# Patient Record
Sex: FEMALE | Race: WHITE | NOT HISPANIC OR LATINO | Employment: OTHER | URBAN - METROPOLITAN AREA
[De-identification: names, ages, dates, MRNs, and addresses within clinical notes are randomized per-mention and may not be internally consistent; named-entity substitution may affect disease eponyms.]

---

## 2013-11-04 LAB — HEP C HIM: NORMAL

## 2017-02-09 ENCOUNTER — TELEPHONE (OUTPATIENT)
Dept: FAMILY MEDICINE | Facility: CLINIC | Age: 52
End: 2017-02-09

## 2017-02-09 NOTE — TELEPHONE ENCOUNTER
Received prior authorization approval for zolpidem, effective 1/7/17 to 8/5/17.  The pharmacy and the patient have been informed.    Baltazar Olivera,   [facility]  [phone]

## 2017-03-16 DIAGNOSIS — K21.9 GASTROESOPHAGEAL REFLUX DISEASE WITHOUT ESOPHAGITIS: ICD-10-CM

## 2017-03-20 RX ORDER — OMEPRAZOLE 40 MG/1
40 CAPSULE, DELAYED RELEASE ORAL 2 TIMES DAILY
Qty: 180 CAPSULE | Refills: 3 | Status: SHIPPED | OUTPATIENT
Start: 2017-03-20 | End: 2018-03-22

## 2017-04-06 DIAGNOSIS — Z76.0 ENCOUNTER FOR MEDICATION REFILL: ICD-10-CM

## 2017-04-06 NOTE — TELEPHONE ENCOUNTER
Pending Prescriptions:                       Disp   Refills    zolpidem (AMBIEN) 10 MG tablet [Pharmacy M*30 tab*0        Sig: TAKE 1 TABLET BY MOUTH EVERY NIGHT AS NEEDED    Last OV 11/11/16  CMP 9/12/16

## 2017-04-07 RX ORDER — ZOLPIDEM TARTRATE 10 MG/1
TABLET ORAL
Qty: 30 TABLET | Refills: 0 | Status: SHIPPED | OUTPATIENT
Start: 2017-04-07 | End: 2017-05-02

## 2017-04-10 DIAGNOSIS — Z79.899 ENCOUNTER FOR LONG-TERM (CURRENT) USE OF MEDICATIONS: Primary | ICD-10-CM

## 2017-04-10 PROCEDURE — 80053 COMPREHEN METABOLIC PANEL: CPT | Mod: 90 | Performed by: FAMILY MEDICINE

## 2017-04-10 PROCEDURE — 36415 COLL VENOUS BLD VENIPUNCTURE: CPT | Performed by: FAMILY MEDICINE

## 2017-04-11 LAB
ALBUMIN SERPL-MCNC: 4.4 G/DL (ref 3.5–5.5)
ALBUMIN/GLOB SERPL: 1.6 {RATIO} (ref 1.2–2.2)
ALP SERPL-CCNC: 56 IU/L (ref 39–117)
ALT SERPL-CCNC: 15 IU/L (ref 0–32)
AST SERPL-CCNC: 15 IU/L (ref 0–40)
BILIRUB SERPL-MCNC: 0.3 MG/DL (ref 0–1.2)
BUN SERPL-MCNC: 29 MG/DL (ref 6–24)
BUN/CREATININE RATIO: 23 (ref 9–23)
CALCIUM SERPL-MCNC: 9.9 MG/DL (ref 8.7–10.2)
CHLORIDE SERPLBLD-SCNC: 104 MMOL/L (ref 96–106)
CREAT SERPL-MCNC: 1.25 MG/DL (ref 0.57–1)
EGFR IF AFRICN AM: 58 ML/MIN/1.73
EGFR IF NONAFRICN AM: 51 ML/MIN/1.73
GLOBULIN, TOTAL: 2.8 G/DL (ref 1.5–4.5)
GLUCOSE SERPL-MCNC: 83 MG/DL (ref 65–99)
POTASSIUM SERPL-SCNC: 4.3 MMOL/L (ref 3.5–5.2)
PROT SERPL-MCNC: 7.2 G/DL (ref 6–8.5)
SODIUM SERPL-SCNC: 143 MMOL/L (ref 134–144)
TOTAL CO2: 21 MMOL/L (ref 18–28)

## 2017-04-13 DIAGNOSIS — Z94.4 TRANSPLANTED LIVER (H): Primary | ICD-10-CM

## 2017-07-24 DIAGNOSIS — Z01.89 ROUTINE LAB DRAW: Primary | ICD-10-CM

## 2017-07-24 PROCEDURE — 36415 COLL VENOUS BLD VENIPUNCTURE: CPT | Performed by: FAMILY MEDICINE

## 2017-07-25 ENCOUNTER — TELEPHONE (OUTPATIENT)
Dept: FAMILY MEDICINE | Facility: CLINIC | Age: 52
End: 2017-07-25

## 2017-07-25 NOTE — TELEPHONE ENCOUNTER
Received fax from [employer] saying patients PA for Zolpidem needs new prior authorization.   Submitted to [email or web address].   Received fax back stating PA was approved.  Approval dates 06/24/2017-07/24/2018.   Called [employer] to inform of approval.

## 2017-08-04 NOTE — PROGRESS NOTES
7/27/17 we faxed lab results from 10/17/16 to 4/10/17 to [facility], atten: erik @ [phone]    Baltazar Olivera,   [facility]  [phone]

## 2017-10-12 DIAGNOSIS — E78.5 HYPERLIPIDEMIA WITH TARGET LDL LESS THAN 100: Primary | ICD-10-CM

## 2017-10-12 DIAGNOSIS — Z94.4 TRANSPLANTED LIVER (H): ICD-10-CM

## 2017-10-12 LAB
% GRANULOCYTES: 69.7 % (ref 42.2–75.2)
HCT VFR BLD AUTO: 41.9 % (ref 35–46)
HEMOGLOBIN: 13.8 G/DL (ref 11.8–15.5)
LYMPHOCYTES NFR BLD AUTO: 24.2 % (ref 20.5–51.1)
MCH RBC QN AUTO: 28.1 PG (ref 27–31)
MCHC RBC AUTO-ENTMCNC: 33.1 G/DL (ref 33–37)
MCV RBC AUTO: 85 FL (ref 80–100)
MONOCYTES NFR BLD AUTO: 6.1 % (ref 1.7–9.3)
PLATELET # BLD AUTO: 201 K/UL (ref 140–450)
RBC # BLD AUTO: 4.93 X10/CMM (ref 3.7–5.2)
WBC # BLD AUTO: 7.7 X10/CMM (ref 3.8–11)

## 2017-10-12 PROCEDURE — 36415 COLL VENOUS BLD VENIPUNCTURE: CPT | Performed by: FAMILY MEDICINE

## 2017-10-12 PROCEDURE — 80053 COMPREHEN METABOLIC PANEL: CPT | Mod: 90 | Performed by: FAMILY MEDICINE

## 2017-10-12 PROCEDURE — 85025 COMPLETE CBC W/AUTO DIFF WBC: CPT | Performed by: FAMILY MEDICINE

## 2017-10-13 LAB
ALBUMIN SERPL-MCNC: 4.2 G/DL (ref 3.5–5.5)
ALBUMIN/GLOB SERPL: 1.4 {RATIO} (ref 1.2–2.2)
ALP SERPL-CCNC: 57 IU/L (ref 39–117)
ALT SERPL-CCNC: 8 IU/L (ref 0–32)
AST SERPL-CCNC: 15 IU/L (ref 0–40)
BILIRUB SERPL-MCNC: 0.3 MG/DL (ref 0–1.2)
BUN SERPL-MCNC: 27 MG/DL (ref 6–24)
BUN/CREATININE RATIO: 20 (ref 9–23)
CALCIUM SERPL-MCNC: 9.6 MG/DL (ref 8.7–10.2)
CHLORIDE SERPLBLD-SCNC: 104 MMOL/L (ref 96–106)
CREAT SERPL-MCNC: 1.32 MG/DL (ref 0.57–1)
EGFR IF AFRICN AM: 54 ML/MIN/1.73
EGFR IF NONAFRICN AM: 47 ML/MIN/1.73
GLOBULIN, TOTAL: 2.9 G/DL (ref 1.5–4.5)
GLUCOSE SERPL-MCNC: 91 MG/DL (ref 65–99)
POTASSIUM SERPL-SCNC: 4.3 MMOL/L (ref 3.5–5.2)
PROT SERPL-MCNC: 7.1 G/DL (ref 6–8.5)
SODIUM SERPL-SCNC: 143 MMOL/L (ref 134–144)
TOTAL CO2: 22 MMOL/L (ref 18–28)

## 2017-10-26 DIAGNOSIS — Z76.0 ENCOUNTER FOR MEDICATION REFILL: ICD-10-CM

## 2017-10-26 NOTE — TELEPHONE ENCOUNTER
ambien last OV - 11/11/16 last labs 10/12/17- due no appt scheduled  Ashley Berry MA October 26, 2017 8:40 AM

## 2017-10-27 RX ORDER — ZOLPIDEM TARTRATE 10 MG/1
TABLET ORAL
Qty: 30 TABLET | Refills: 0 | Status: SHIPPED | OUTPATIENT
Start: 2017-10-27 | End: 2017-11-27

## 2017-11-14 ENCOUNTER — TRANSFERRED RECORDS (OUTPATIENT)
Dept: FAMILY MEDICINE | Facility: CLINIC | Age: 52
End: 2017-11-14

## 2017-11-16 ENCOUNTER — TRANSFERRED RECORDS (OUTPATIENT)
Dept: FAMILY MEDICINE | Facility: CLINIC | Age: 52
End: 2017-11-16

## 2017-11-27 DIAGNOSIS — Z76.0 ENCOUNTER FOR MEDICATION REFILL: ICD-10-CM

## 2017-11-27 RX ORDER — ZOLPIDEM TARTRATE 10 MG/1
TABLET ORAL
Qty: 30 TABLET | Refills: 0 | Status: SHIPPED | OUTPATIENT
Start: 2017-11-27 | End: 2017-12-26

## 2017-11-27 NOTE — TELEPHONE ENCOUNTER
zolpidem (AMBIEN) 10 MG last OV - 11/11/16 last labs 10/12/17- due with no appt scheduled  Ashley Berry MA November 27, 2017 2:34 PM

## 2017-12-01 ENCOUNTER — TRANSFERRED RECORDS (OUTPATIENT)
Dept: FAMILY MEDICINE | Facility: CLINIC | Age: 52
End: 2017-12-01

## 2017-12-13 ENCOUNTER — OFFICE VISIT (OUTPATIENT)
Dept: FAMILY MEDICINE | Facility: CLINIC | Age: 52
End: 2017-12-13

## 2017-12-13 VITALS
OXYGEN SATURATION: 98 % | HEART RATE: 59 BPM | HEIGHT: 62 IN | DIASTOLIC BLOOD PRESSURE: 62 MMHG | BODY MASS INDEX: 35.51 KG/M2 | WEIGHT: 193 LBS | SYSTOLIC BLOOD PRESSURE: 122 MMHG

## 2017-12-13 DIAGNOSIS — F33.1 MAJOR DEPRESSIVE DISORDER, RECURRENT EPISODE, MODERATE (H): ICD-10-CM

## 2017-12-13 DIAGNOSIS — G44.201 ACUTE INTRACTABLE TENSION-TYPE HEADACHE: ICD-10-CM

## 2017-12-13 DIAGNOSIS — Z23 NEED FOR PROPHYLACTIC VACCINATION AND INOCULATION AGAINST INFLUENZA: Primary | ICD-10-CM

## 2017-12-13 PROCEDURE — 90471 IMMUNIZATION ADMIN: CPT | Performed by: FAMILY MEDICINE

## 2017-12-13 PROCEDURE — 90686 IIV4 VACC NO PRSV 0.5 ML IM: CPT | Performed by: FAMILY MEDICINE

## 2017-12-13 PROCEDURE — 99214 OFFICE O/P EST MOD 30 MIN: CPT | Mod: 25 | Performed by: FAMILY MEDICINE

## 2017-12-13 RX ORDER — CYCLOBENZAPRINE HCL 10 MG
10 TABLET ORAL
Qty: 14 TABLET | Refills: 1 | Status: SHIPPED | OUTPATIENT
Start: 2017-12-13 | End: 2018-03-28

## 2017-12-13 RX ORDER — DULOXETIN HYDROCHLORIDE 30 MG/1
30 CAPSULE, DELAYED RELEASE ORAL 2 TIMES DAILY
Qty: 60 CAPSULE | Refills: 1 | Status: SHIPPED | OUTPATIENT
Start: 2017-12-13 | End: 2018-02-18

## 2017-12-13 ASSESSMENT — ANXIETY QUESTIONNAIRES
5. BEING SO RESTLESS THAT IT IS HARD TO SIT STILL: MORE THAN HALF THE DAYS
6. BECOMING EASILY ANNOYED OR IRRITABLE: NEARLY EVERY DAY
7. FEELING AFRAID AS IF SOMETHING AWFUL MIGHT HAPPEN: MORE THAN HALF THE DAYS
IF YOU CHECKED OFF ANY PROBLEMS ON THIS QUESTIONNAIRE, HOW DIFFICULT HAVE THESE PROBLEMS MADE IT FOR YOU TO DO YOUR WORK, TAKE CARE OF THINGS AT HOME, OR GET ALONG WITH OTHER PEOPLE: EXTREMELY DIFFICULT
3. WORRYING TOO MUCH ABOUT DIFFERENT THINGS: NEARLY EVERY DAY
GAD7 TOTAL SCORE: 19
1. FEELING NERVOUS, ANXIOUS, OR ON EDGE: NEARLY EVERY DAY
2. NOT BEING ABLE TO STOP OR CONTROL WORRYING: NEARLY EVERY DAY

## 2017-12-13 ASSESSMENT — PATIENT HEALTH QUESTIONNAIRE - PHQ9
5. POOR APPETITE OR OVEREATING: NEARLY EVERY DAY
SUM OF ALL RESPONSES TO PHQ QUESTIONS 1-9: 20

## 2017-12-13 NOTE — PROGRESS NOTES
"Here for refills of zolpidem. Also headache for 10 days; steady, top of head. Takes tylenol everyday, helps but doesn't go away. No URI symptoms. History of migraines in the past. Also recent venlafaxine use, prescribed by a [place] physician for depression, but made her heart race, felt \"out of whack\". She has used cymbalta in the past, but did not feel it helped. However, she only took 30 mg a day.  Patient Active Problem List   Diagnosis     History of liver transplant (H)     HTN (hypertension)     History of kidney transplant     Hyperlipidemia with target LDL less than 100     History of gout     Anemia     Scleroderma, diffuse (H)     Raynaud phenomenon     Health Care Home     History of transplantation, renal     Status post liver transplantation (H)     Controlled substance agreement signed     Fibromyalgia     Current Outpatient Prescriptions   Medication     zolpidem (AMBIEN) 10 MG tablet     omeprazole (PRILOSEC) 40 MG capsule     amLODIPine (NORVASC) 5 MG tablet     mycophenolate (CELLCEPT - GENERIC EQUIVALENT) 250 MG capsule     predniSONE (DELTASONE) 5 MG tablet     CALCIUM-VITAMIN D PO     allopurinol (ZYLOPRIM) 100 MG tablet     metoprolol (TOPROL-XL) 25 MG 24 hr tablet     aspirin 81 MG tablet     tacrolimus (PROGRAF) 1 MG capsule     No current facility-administered medications for this visit.      ROS: feels down, not interesting in socializing. Really since transplant about 5 years ago. No fevers, CP, SOB.      /62  Pulse 59  Ht 1.562 m (5' 1.5\")  Wt 87.5 kg (193 lb)  SpO2 98%  BMI 35.88 kg/m2     Alert, and oriented. Mood is fair. Insight seems fine.   Turbinates are normal, oropharynx is normal. Scalp is clear. Neck slightly stiff, slightly muscle tenderness. RRR. Lungs are clear.    (Z23) Need for prophylactic vaccination and inoculation against influenza  (primary encounter diagnosis)  Comment:   Plan: FLU VAC, SPLIT VIRUS IM > 3 YO (QUADRIVALENT)         [65917], Vaccine " Administration, Initial         [09920]            (G44.201) Acute intractable tension-type headache  Comment: prob cervical strain  Plan: cyclobenzaprine (FLEXERIL) 10 MG tablet         Hs prn #14. NR. Warned of addictive potential, and will be refilled infrequently(2-3X annually)    (F33.1) Major depressive disorder, recurrent episode, moderate (H)  Comment:   Plan: DULoxetine (CYMBALTA) 30 MG EC capsule        Start daily, then bid in a few days, and RTC in 2-3 weeks      Injectable Influenza Immunization Documentation    1.  Is the person to be vaccinated sick today?   No    2. Does the person to be vaccinated have an allergy to a component   of the vaccine?   No  Egg Allergy Algorithm Link    3. Has the person to be vaccinated ever had a serious reaction   to influenza vaccine in the past?   No    4. Has the person to be vaccinated ever had Guillain-Barré syndrome?   No    Form completed by

## 2017-12-13 NOTE — MR AVS SNAPSHOT
After Visit Summary   12/13/2017    Nani Ford    MRN: [id number]           Patient Information     Date Of Birth          1965        Visit Information        Provider Department      12/13/2017 8:15 AM Evelio Torrez MD [facility]        Today's Diagnoses     Need for prophylactic vaccination and inoculation against influenza    -  1    Acute intractable tension-type headache        Major depressive disorder, recurrent episode, moderate (H)           Follow-ups after your visit        Your next 10 appointments already scheduled     Jan 03, 2018  8:30 AM CST   SHORT with Evelio Torrez MD   [facility] ([facility])    [address] [place] [address] [phone]              Who to contact     If you have questions or need follow up information about today's clinic visit or your schedule please contact [facility] directly at [phone].  Normal or non-critical lab and imaging results will be communicated to you by MyChart, letter or phone within 4 business days after the clinic has received the results. If you do not hear from us within 7 days, please contact the clinic through [employer] or phone. If you have a critical or abnormal lab result, we will notify you by phone as soon as possible.  Submit refill requests through [employer] or call your pharmacy and they will forward the refill request to us. Please allow 3 business days for your refill to be completed.          Additional Information About Your Visit        MyChart Information     [employer] gives you secure access to your electronic health record. If you see a primary care provider, you can also send messages to your care team and make appointments. If you have questions, please call your primary care clinic.  If you do not have a primary care provider, please call [phone] and they will assist you.        Care EveryWhere ID     This is your Care EveryWhere  "ID. This could be used by other organizations to access your [place] medical records  [id number]        Your Vitals Were     Pulse Height Pulse Oximetry BMI (Body Mass Index)          59 1.562 m (5' 1.5\") 98% 35.88 kg/m2         Blood Pressure from Last 3 Encounters:   12/13/17 122/62   11/11/16 102/60   11/02/16 140/90    Weight from Last 3 Encounters:   12/13/17 87.5 kg (193 lb)   11/11/16 84.4 kg (186 lb)   11/02/16 83.5 kg (184 lb)              We Performed the Following     FLU VAC, SPLIT VIRUS IM > 3 YO (QUADRIVALENT) [66883]     Vaccine Administration, Initial [63053]          Today's Medication Changes          These changes are accurate as of: 12/13/17  3:37 PM.  If you have any questions, ask your nurse or doctor.               Start taking these medicines.        Dose/Directions    cyclobenzaprine 10 MG tablet   Commonly known as:  FLEXERIL   Used for:  Acute intractable tension-type headache   Started by:  Evelio Torrez MD        Dose:  10 mg   Take 1 tablet (10 mg) by mouth nightly as needed for muscle spasms   Quantity:  14 tablet   Refills:  1         These medicines have changed or have updated prescriptions.        Dose/Directions    DULoxetine 30 MG EC capsule   Commonly known as:  CYMBALTA   This may have changed:  when to take this   Used for:  Major depressive disorder, recurrent episode, moderate (H)   Changed by:  Evelio Torrez MD        Dose:  30 mg   Take 1 capsule (30 mg) by mouth 2 times daily   Quantity:  60 capsule   Refills:  1       tacrolimus 1 MG capsule   Indication:  PT TAKES 1.5 TABS 2X DAY   This may have changed:  Another medication with the same name was removed. Continue taking this medication, and follow the directions you see here.   Generic drug:  tacrolimus   Changed by:  Tavo Mann MD        Dose:  1 mg   Take 1 mg by mouth 1.5 mg BID   Refills:  0         Stop taking these medicines if you haven't already. Please contact your care team if you have " questions.     estradiol 1 MG tablet   Commonly known as:  ESTRACE   Stopped by:  Evelio Torrez MD           HYDROcodone-acetaminophen 7.5-325 MG per tablet   Commonly known as:  NORCO   Stopped by:  Evelio Torrez MD           imipramine 25 MG tablet   Commonly known as:  TOFRANIL   Stopped by:  Evelio Torrez MD           oxyCODONE 5 MG capsule   Commonly known as:  OXY-IR   Stopped by:  Evelio Torrez MD                Where to get your medicines      These medications were sent to [facility] [address] [place] [address] [address] & [facility]  [address] [place], [place] [address]E [facility] [address]    Hours:  24-hours Phone:  [phone]     cyclobenzaprine 10 MG tablet    DULoxetine 30 MG EC capsule                Primary Care Provider Office Phone # Fax #    Evelio Torrez [phone] [address] [place] [address]        Equal Access to Services     [facility]: Hadii aad ku hadasho Soomaali, waaxda luqadaha, qaybta kaalmada adeegyada, waxay idiin hayaan adeeg kharash la'aan . So [facility] [phone].    ATENCIÓN: Si habla español, tiene a valencia disposición servicios gratuitos de asistencia lingüística. [facility] [phone].    We comply with applicable federal civil rights laws and [place] laws. We do not discriminate on the basis of race, color, national origin, age, disability, sex, sexual orientation, or gender identity.            Thank you!     Thank you for choosing [facility]  for your care. Our goal is always to provide you with excellent care. Hearing back from our patients is one way we can continue to improve our services. Please take a few minutes to complete the written survey that you may receive in the mail after your visit with us. Thank you!             Your Updated Medication List - Protect others around you: Learn how to safely use, store and throw away your medicines at www.disposemymeds.org.          This list is  accurate as of: 12/13/17  3:37 PM.  Always use your most recent med list.                   Brand Name Dispense Instructions for use Diagnosis    allopurinol 100 MG tablet    ZYLOPRIM    30 tablet    Take 1 tablet by mouth daily.    Gout, chronic       amLODIPine 5 MG tablet    NORVASC          aspirin 81 MG tablet      Take 81 mg by mouth daily.        CALCIUM-VITAMIN D PO      Take 1 tablet by mouth 2 times daily.        cyclobenzaprine 10 MG tablet    FLEXERIL    14 tablet    Take 1 tablet (10 mg) by mouth nightly as needed for muscle spasms    Acute intractable tension-type headache       DULoxetine 30 MG EC capsule    CYMBALTA    60 capsule    Take 1 capsule (30 mg) by mouth 2 times daily    Major depressive disorder, recurrent episode, moderate (H)       metoprolol 25 MG 24 hr tablet    TOPROL-XL     Take 25 mg by mouth daily.        mycophenolate 250 MG capsule    GENERIC EQUIVALENT          omeprazole 40 MG capsule    priLOSEC    180 capsule    Take 1 capsule (40 mg) by mouth 2 times daily    Gastroesophageal reflux disease without esophagitis       predniSONE 5 MG tablet    DELTASONE     Take 1 tablet (5 mg) by mouth daily    Scleroderma (H)       tacrolimus 1 MG capsule   Generic drug:  tacrolimus      Take 1 mg by mouth 1.5 mg BID        zolpidem 10 MG tablet    AMBIEN    30 tablet    TAKE 1 TABLET BY MOUTH EVERY NIGHT AT BEDTIME    Encounter for medication refill

## 2017-12-14 ASSESSMENT — ANXIETY QUESTIONNAIRES: GAD7 TOTAL SCORE: 19

## 2017-12-26 DIAGNOSIS — Z76.0 ENCOUNTER FOR MEDICATION REFILL: ICD-10-CM

## 2017-12-26 RX ORDER — ZOLPIDEM TARTRATE 10 MG/1
TABLET ORAL
Qty: 30 TABLET | Refills: 0 | Status: SHIPPED | OUTPATIENT
Start: 2017-12-26 | End: 2018-01-24

## 2018-01-24 DIAGNOSIS — Z79.899 ENCOUNTER FOR LONG-TERM (CURRENT) USE OF MEDICATIONS: Primary | ICD-10-CM

## 2018-01-24 NOTE — TELEPHONE ENCOUNTER
zolpidem (AMBIEN) 10 MG   LAST  Med check 12/13/17   last labs(for RX) 10/13/17  Next  appt scheduled =  none  Ashley Berry MA

## 2018-01-26 RX ORDER — ZOLPIDEM TARTRATE 10 MG/1
TABLET ORAL
Qty: 30 TABLET | Refills: 1 | Status: SHIPPED | OUTPATIENT
Start: 2018-01-26 | End: 2018-03-22

## 2018-02-18 DIAGNOSIS — F33.1 MAJOR DEPRESSIVE DISORDER, RECURRENT EPISODE, MODERATE (H): ICD-10-CM

## 2018-02-18 RX ORDER — DULOXETIN HYDROCHLORIDE 30 MG/1
CAPSULE, DELAYED RELEASE ORAL
Qty: 60 CAPSULE | Refills: 0 | Status: SHIPPED | OUTPATIENT
Start: 2018-02-18 | End: 2018-03-28

## 2018-03-22 DIAGNOSIS — F33.1 MAJOR DEPRESSIVE DISORDER, RECURRENT EPISODE, MODERATE (H): ICD-10-CM

## 2018-03-22 RX ORDER — DULOXETIN HYDROCHLORIDE 30 MG/1
CAPSULE, DELAYED RELEASE ORAL
Qty: 60 CAPSULE | Refills: 0 | Status: CANCELLED | OUTPATIENT
Start: 2018-03-22

## 2018-03-23 NOTE — TELEPHONE ENCOUNTER
Dr. Browning received refill request for duloxetine and reviewed chart and visit history.   Patient last seen by Dr. Torrez 12/2017 and duloxetine started with instructions to RTC for recheck in 2-3 weeks. Patient did not follow through on this.     Per Dr. Browning -- Due for PHQ9. Last one was very high. Shan patient. If high schedule appointment. If phq=5 then ok. Fill script to get to appointment or if ok for 6 months.    Patient has appt with Dr. Torrez 3/28/18. For med check. Note on schedule to do PHQ9 at this visit.   Jazmin Way RN

## 2018-03-28 ENCOUNTER — OFFICE VISIT (OUTPATIENT)
Dept: FAMILY MEDICINE | Facility: CLINIC | Age: 53
End: 2018-03-28

## 2018-03-28 VITALS
HEART RATE: 57 BPM | RESPIRATION RATE: 16 BRPM | DIASTOLIC BLOOD PRESSURE: 80 MMHG | BODY MASS INDEX: 35.32 KG/M2 | SYSTOLIC BLOOD PRESSURE: 112 MMHG | WEIGHT: 190 LBS | OXYGEN SATURATION: 98 %

## 2018-03-28 DIAGNOSIS — M79.7 FIBROMYALGIA: Primary | ICD-10-CM

## 2018-03-28 DIAGNOSIS — F33.1 MAJOR DEPRESSIVE DISORDER, RECURRENT EPISODE, MODERATE (H): ICD-10-CM

## 2018-03-28 DIAGNOSIS — Z94.4 TRANSPLANTED LIVER (H): ICD-10-CM

## 2018-03-28 LAB
% GRANULOCYTES: 66.6 % (ref 42.2–75.2)
HCT VFR BLD AUTO: 43.2 % (ref 35–46)
HEMOGLOBIN: 13.8 G/DL (ref 11.8–15.5)
LYMPHOCYTES NFR BLD AUTO: 26 % (ref 20.5–51.1)
MCH RBC QN AUTO: 27.1 PG (ref 27–31)
MCHC RBC AUTO-ENTMCNC: 32 G/DL (ref 33–37)
MCV RBC AUTO: 84.9 FL (ref 80–100)
MONOCYTES NFR BLD AUTO: 7.4 % (ref 1.7–9.3)
PLATELET # BLD AUTO: 198 K/UL (ref 140–450)
RBC # BLD AUTO: 5.08 X10/CMM (ref 3.7–5.2)
WBC # BLD AUTO: 7.6 X10/CMM (ref 3.8–11)

## 2018-03-28 PROCEDURE — 80053 COMPREHEN METABOLIC PANEL: CPT | Mod: 90 | Performed by: FAMILY MEDICINE

## 2018-03-28 PROCEDURE — 36415 COLL VENOUS BLD VENIPUNCTURE: CPT | Performed by: FAMILY MEDICINE

## 2018-03-28 PROCEDURE — 85025 COMPLETE CBC W/AUTO DIFF WBC: CPT | Performed by: FAMILY MEDICINE

## 2018-03-28 PROCEDURE — 99213 OFFICE O/P EST LOW 20 MIN: CPT | Performed by: FAMILY MEDICINE

## 2018-03-28 RX ORDER — ACETAMINOPHEN 500 MG
1000 TABLET ORAL
COMMUNITY
End: 2018-03-28

## 2018-03-28 RX ORDER — NORTRIPTYLINE HCL 25 MG
25 CAPSULE ORAL AT BEDTIME
Qty: 30 CAPSULE | Refills: 2 | Status: SHIPPED | OUTPATIENT
Start: 2018-03-28 | End: 2018-06-21

## 2018-03-28 RX ORDER — DULOXETIN HYDROCHLORIDE 60 MG/1
60 CAPSULE, DELAYED RELEASE ORAL DAILY
Qty: 90 CAPSULE | Refills: 3 | Status: SHIPPED | OUTPATIENT
Start: 2018-03-28 | End: 2019-01-18

## 2018-03-28 ASSESSMENT — ANXIETY QUESTIONNAIRES
1. FEELING NERVOUS, ANXIOUS, OR ON EDGE: MORE THAN HALF THE DAYS
7. FEELING AFRAID AS IF SOMETHING AWFUL MIGHT HAPPEN: MORE THAN HALF THE DAYS
3. WORRYING TOO MUCH ABOUT DIFFERENT THINGS: MORE THAN HALF THE DAYS
6. BECOMING EASILY ANNOYED OR IRRITABLE: MORE THAN HALF THE DAYS
2. NOT BEING ABLE TO STOP OR CONTROL WORRYING: MORE THAN HALF THE DAYS
IF YOU CHECKED OFF ANY PROBLEMS ON THIS QUESTIONNAIRE, HOW DIFFICULT HAVE THESE PROBLEMS MADE IT FOR YOU TO DO YOUR WORK, TAKE CARE OF THINGS AT HOME, OR GET ALONG WITH OTHER PEOPLE: VERY DIFFICULT
5. BEING SO RESTLESS THAT IT IS HARD TO SIT STILL: MORE THAN HALF THE DAYS
GAD7 TOTAL SCORE: 14

## 2018-03-28 ASSESSMENT — PATIENT HEALTH QUESTIONNAIRE - PHQ9: 5. POOR APPETITE OR OVEREATING: MORE THAN HALF THE DAYS

## 2018-03-28 NOTE — NURSING NOTE
Patient also brought lavender tube labeled to be drawn for prograft levels - due to monitoring kidney rejection medications - in pre paid & marked mailing package  Besides standing orders for cbc & comp to have results  faxed to [facility]  Ashley Berry MA March 28, 2018 9:45 AM    [phone] - Dr JULIANE Saba- Hepatology & Liver transplantion

## 2018-03-28 NOTE — TELEPHONE ENCOUNTER
PHQ-9 done by patient at visit with Dr. Torrez today. Dr. Torrez sent rx for duloxetine x 1 year.  Jazmin Way RN

## 2018-03-28 NOTE — LETTER
My Depression Action Plan  Name: Nani Ford   Date of Birth 1965  Date: 3/28/2018    My doctor: Evelio Torrez   My clinic: [facility] [facility] [address] [place] [address] [phone]          GREEN    ZONE   Good Control    What it looks like:     Things are going generally well. You have normal up s and down s. You may even feel depressed from time to time, but bad moods usually last less than a day.   What you need to do:  1. Continue to care for yourself (see self care plan)  2. Check your depression survival kit and update it as needed  3. Follow your physician s recommendations including any medication.  4. Do not stop taking medication unless you consult with your physician first.           YELLOW         ZONE Getting Worse    What it looks like:     Depression is starting to interfere with your life.     It may be hard to get out of bed; you may be starting to isolate yourself from others.    Symptoms of depression are starting to last most all day and this has happened for several days.     You may have suicidal thoughts but they are not constant.   What you need to do:     1. Call your care team, your response to treatment will improve if you keep your care team informed of your progress. Yellow periods are signs an adjustment may need to be made.     2. Continue your self-care, even if you have to fake it!    3. Talk to someone in your support network    4. Open up your depression survival kit           RED    ZONE Medical Alert - Get Help    What it looks like:     Depression is seriously interfering with your life.     You may experience these or other symptoms: You can t get out of bed most days, can t work or engage in other necessary activities, you have trouble taking care of basic hygiene, or basic responsibilities, thoughts of suicide or death that will not go away, self-injurious behavior.     What you need to do:  1. Call your care team and  request a same-day appointment. If they are not available (weekends or after hours) call your local crisis line, emergency room or 911.            Depression Self Care Plan / Survival Kit    Self-Care for Depression  Here s the deal. Your body and mind are really not as separate as most people think.  What you do and think affects how you feel and how you feel influences what you do and think. This means if you do things that people who feel good do, it will help you feel better.  Sometimes this is all it takes.  There is also a place for medication and therapy depending on how severe your depression is, so be sure to consult with your medical provider and/ or Behavioral Health Consultant if your symptoms are worsening or not improving.     In order to better manage my stress, I will:    Exercise  Get some form of exercise, every day. This will help reduce pain and release endorphins, the  feel good  chemicals in your brain. This is almost as good as taking antidepressants!  This is not the same as joining a gym and then never going! (they count on that by the way ) It can be as simple as just going for a walk or doing some gardening, anything that will get you moving.      Hygiene   Maintain good hygiene (Get out of bed in the morning, Make your bed, Brush your teeth, Take a shower, and Get dressed like you were going to work, even if you are unemployed).  If your clothes don't fit try to get ones that do.    Diet  I will strive to eat foods that are good for me, drink plenty of water, and avoid excessive sugar, caffeine, alcohol, and other mood-altering substances.  Some foods that are helpful in depression are: complex carbohydrates, B vitamins, flaxseed, fish or fish oil, fresh fruits and vegetables.    Psychotherapy  I agree to participate in Individual Therapy (if recommended).    Medication  If prescribed medications, I agree to take them.  Missing doses can result in serious side effects.  I understand that  drinking alcohol, or other illicit drug use, may cause potential side effects.  I will not stop my medication abruptly without first discussing it with my provider.    Staying Connected With Others  I will stay in touch with my friends, family members, and my primary care provider/team.    Use your imagination  Be creative.  We all have a creative side; it doesn t matter if it s oil painting, sand castles, or mud pies! This will also kick up the endorphins.    Witness Beauty  (AKA stop and smell the roses) Take a look outside, even in mid-winter. Notice colors, textures. Watch the squirrels and birds.     Service to others  Be of service to others.  There is always someone else in need.  By helping others we can  get out of ourselves  and remember the really important things.  This also provides opportunities for practicing all the other parts of the program.    Humor  Laugh and be silly!  Adjust your TV habits for less news and crime-drama and more comedy.    Control your stress  Try breathing deep, massage therapy, biofeedback, and meditation. Find time to relax each day.     My support system    Clinic Contact:  Phone number:    Contact 1:  Phone number:    Contact 2:  Phone number:    Restorationist/:  Phone number:    Therapist:  Phone number:    Local crisis center:    Phone number:    Other community support:  Phone number:

## 2018-03-28 NOTE — MR AVS SNAPSHOT
After Visit Summary   3/28/2018    Nani Ford    MRN: [id number]           Patient Information     Date Of Birth          1965        Visit Information        Provider Department      3/28/2018 8:45 AM Evelio Torrez MD [facility]        Today's Diagnoses     Fibromyalgia    -  1    Major depressive disorder, recurrent episode, moderate (H)        Transplanted liver (H)           Follow-ups after your visit        Who to contact     If you have questions or need follow up information about today's clinic visit or your schedule please contact [facility] directly at [phone].  Normal or non-critical lab and imaging results will be communicated to you by Nouscohart, letter or phone within 4 business days after the clinic has received the results. If you do not hear from us within 7 days, please contact the clinic through TUBEt or phone. If you have a critical or abnormal lab result, we will notify you by phone as soon as possible.  Submit refill requests through [employer] or call your pharmacy and they will forward the refill request to us. Please allow 3 business days for your refill to be completed.          Additional Information About Your Visit        MyChart Information     [employer] gives you secure access to your electronic health record. If you see a primary care provider, you can also send messages to your care team and make appointments. If you have questions, please call your primary care clinic.  If you do not have a primary care provider, please call [phone] and they will assist you.        Care EveryWhere ID     This is your Care EveryWhere ID. This could be used by other organizations to access your [place] medical records  [id number]        Your Vitals Were     Pulse Respirations Pulse Oximetry BMI (Body Mass Index)          57 16 98% 35.32 kg/m2         Blood Pressure from Last 3 Encounters:   03/28/18 112/80   12/13/17 122/62   11/11/16  102/60    Weight from Last 3 Encounters:   03/28/18 86.2 kg (190 lb)   12/13/17 87.5 kg (193 lb)   11/11/16 84.4 kg (186 lb)              We Performed the Following     CBC with Diff/Plt ([facility])     Comp. Metabolic Panel (14) ([facility])          Today's Medication Changes          These changes are accurate as of 3/28/18  2:22 PM.  If you have any questions, ask your nurse or doctor.               Start taking these medicines.        Dose/Directions    nortriptyline 25 MG capsule   Commonly known as:  PAMELOR   Used for:  Fibromyalgia   Started by:  Evelio Torrez MD        Dose:  25 mg   Take 1 capsule (25 mg) by mouth At Bedtime   Quantity:  30 capsule   Refills:  2         These medicines have changed or have updated prescriptions.        Dose/Directions    DULoxetine 60 MG EC capsule   Commonly known as:  CYMBALTA   This may have changed:  See the new instructions.   Used for:  Major depressive disorder, recurrent episode, moderate (H)   Changed by:  Evelio Torrez MD        Dose:  60 mg   Take 1 capsule (60 mg) by mouth daily   Quantity:  90 capsule   Refills:  3         Stop taking these medicines if you haven't already. Please contact your care team if you have questions.     acetaminophen 500 MG tablet   Commonly known as:  TYLENOL   Stopped by:  Evelio Torrez MD           cyclobenzaprine 10 MG tablet   Commonly known as:  FLEXERIL   Stopped by:  Evelio Torrez MD                Where to get your medicines      These medications were sent to [facility] [address] [place] [address] [address] [facility] [address]    Hours:  24-hours Phone:  [phone]     DULoxetine 60 MG EC capsule    nortriptyline 25 MG capsule                Primary Care Provider Office Phone # Fax #    Evelio Torrez [phone] [address] [facility] [address]        Equal Access to Services     JUAN PATEL AH: Domingo gann  Caleb, dorida luqadaha, qaedmondta kacynthia griffiths, kiley wells yuridiasadia laGaylalisa vaibhav. So [facility] [phone].    ATENCIÓN: Si rosa lucio, tiene a valencia disposición servicios gratuitos de asistencia lingüística. Rina al [phone].    We comply with applicable federal civil rights laws and [place] laws. We do not discriminate on the basis of race, color, national origin, age, disability, sex, sexual orientation, or gender identity.            Thank you!     Thank you for choosing [facility]  for your care. Our goal is always to provide you with excellent care. Hearing back from our patients is one way we can continue to improve our services. Please take a few minutes to complete the written survey that you may receive in the mail after your visit with us. Thank you!             Your Updated Medication List - Protect others around you: Learn how to safely use, store and throw away your medicines at www.disposemymeds.org.          This list is accurate as of 3/28/18  2:22 PM.  Always use your most recent med list.                   Brand Name Dispense Instructions for use Diagnosis    allopurinol 100 MG tablet    ZYLOPRIM    30 tablet    Take 1 tablet by mouth daily.    Gout, chronic       amLODIPine 5 MG tablet    NORVASC          aspirin 81 MG tablet      Take 81 mg by mouth daily.        CALCIUM-VITAMIN D PO      Take 1 tablet by mouth 2 times daily.        DULoxetine 60 MG EC capsule    CYMBALTA    90 capsule    Take 1 capsule (60 mg) by mouth daily    Major depressive disorder, recurrent episode, moderate (H)       metoprolol succinate 25 MG 24 hr tablet    TOPROL-XL     Take 25 mg by mouth daily.        mycophenolate 250 MG capsule    GENERIC EQUIVALENT          nortriptyline 25 MG capsule    PAMELOR    30 capsule    Take 1 capsule (25 mg) by mouth At Bedtime    Fibromyalgia       omeprazole 40 MG capsule    priLOSEC    180 capsule    TAKE 1 CAPSULE(40 MG) BY MOUTH TWICE DAILY     Gastroesophageal reflux disease without esophagitis       predniSONE 5 MG tablet    DELTASONE     Take 1 tablet (5 mg) by mouth daily    Scleroderma (H)       tacrolimus 1 MG capsule   Generic drug:  tacrolimus      Take 1 mg by mouth 1.5 mg BID        zolpidem 10 MG tablet    AMBIEN    30 tablet    TAKE 1 TABLET BY MOUTH EVERY DAY AT BEDTIME    Encounter for long-term (current) use of medications

## 2018-03-28 NOTE — PROGRESS NOTES
Recheck use of duloxetine 30 mg bid. She started to feel better in about three weeks. She feels her fibromyalgia is a little better with this, but the cyclobenzaprine was ineffective. Sleep is still a problem. She is exercising, walking up to 5 miles most days!   Patient Active Problem List   Diagnosis     History of liver transplant (H)     HTN (hypertension)     History of kidney transplant     Hyperlipidemia with target LDL less than 100     History of gout     Anemia     Scleroderma, diffuse (H)     Raynaud phenomenon     Health Care Home     History of transplantation, renal     Status post liver transplantation (H)     Controlled substance agreement signed     Fibromyalgia     Major depressive disorder, recurrent episode, moderate (H)     Encounter for long-term (current) use of medications       OBJECTIVE: /80  Pulse 57  Resp 16  Wt 86.2 kg (190 lb)  SpO2 98%  BMI 35.32 kg/m2   PHQ-9=20, down from 30.   She seems in pretty good mood, smiling. Insight seems fine.   (M79.7) Fibromyalgia  (primary encounter diagnosis)  Comment:   Plan: nortriptyline (PAMELOR) 25 MG capsule       Start 25 mg hs, may increase to 50, or 75 mg hs in 2-4 week intervals.    (F33.1) Major depressive disorder, recurrent episode, moderate (H)  Comment: improved  Plan: DULoxetine (CYMBALTA) 60 MG EC capsule        daily

## 2018-03-29 LAB
ALBUMIN SERPL-MCNC: 4.4 G/DL (ref 3.5–5.5)
ALBUMIN/GLOB SERPL: 1.6 {RATIO} (ref 1.2–2.2)
ALP SERPL-CCNC: 53 IU/L (ref 39–117)
ALT SERPL-CCNC: 9 IU/L (ref 0–32)
AST SERPL-CCNC: 17 IU/L (ref 0–40)
BILIRUB SERPL-MCNC: 0.3 MG/DL (ref 0–1.2)
BUN SERPL-MCNC: 31 MG/DL (ref 6–24)
BUN/CREATININE RATIO: 25 (ref 9–23)
CALCIUM SERPL-MCNC: 9.4 MG/DL (ref 8.7–10.2)
CHLORIDE SERPLBLD-SCNC: 103 MMOL/L (ref 96–106)
CREAT SERPL-MCNC: 1.24 MG/DL (ref 0.57–1)
EGFR IF AFRICN AM: 59 ML/MIN/1.73
EGFR IF NONAFRICN AM: 51 ML/MIN/1.73
GLOBULIN, TOTAL: 2.7 G/DL (ref 1.5–4.5)
GLUCOSE SERPL-MCNC: 87 MG/DL (ref 65–99)
POTASSIUM SERPL-SCNC: 4.2 MMOL/L (ref 3.5–5.2)
PROT SERPL-MCNC: 7.1 G/DL (ref 6–8.5)
SODIUM SERPL-SCNC: 143 MMOL/L (ref 134–144)
TOTAL CO2: 25 MMOL/L (ref 18–28)

## 2018-03-29 ASSESSMENT — ANXIETY QUESTIONNAIRES: GAD7 TOTAL SCORE: 14

## 2018-03-29 ASSESSMENT — PATIENT HEALTH QUESTIONNAIRE - PHQ9: SUM OF ALL RESPONSES TO PHQ QUESTIONS 1-9: 15

## 2018-03-29 NOTE — PROGRESS NOTES
Results copied & faxed to  [phone] - Dr JULIANE Saba- Hepatology & Liver transplantion  Ashley Berry MA

## 2018-05-09 DIAGNOSIS — Z76.0 ENCOUNTER FOR MEDICATION REFILL: Primary | ICD-10-CM

## 2018-05-10 RX ORDER — AMLODIPINE BESYLATE 5 MG/1
TABLET ORAL
Qty: 90 TABLET | Refills: 1 | Status: SHIPPED | OUTPATIENT
Start: 2018-05-10 | End: 2018-11-06

## 2018-07-09 ENCOUNTER — OFFICE VISIT (OUTPATIENT)
Dept: FAMILY MEDICINE | Facility: CLINIC | Age: 53
End: 2018-07-09

## 2018-07-09 VITALS
WEIGHT: 194.8 LBS | HEIGHT: 62 IN | BODY MASS INDEX: 35.85 KG/M2 | DIASTOLIC BLOOD PRESSURE: 82 MMHG | SYSTOLIC BLOOD PRESSURE: 118 MMHG | RESPIRATION RATE: 16 BRPM | HEART RATE: 53 BPM | OXYGEN SATURATION: 99 %

## 2018-07-09 DIAGNOSIS — M79.7 FIBROMYALGIA: ICD-10-CM

## 2018-07-09 DIAGNOSIS — F33.2 SEVERE EPISODE OF RECURRENT MAJOR DEPRESSIVE DISORDER, WITHOUT PSYCHOTIC FEATURES (H): Primary | ICD-10-CM

## 2018-07-09 DIAGNOSIS — Z94.4 STATUS POST LIVER TRANSPLANTATION (H): ICD-10-CM

## 2018-07-09 DIAGNOSIS — E78.5 HYPERLIPIDEMIA WITH TARGET LDL LESS THAN 100: ICD-10-CM

## 2018-07-09 DIAGNOSIS — R51.9 NONINTRACTABLE HEADACHE, UNSPECIFIED CHRONICITY PATTERN, UNSPECIFIED HEADACHE TYPE: ICD-10-CM

## 2018-07-09 DIAGNOSIS — Z94.0 HISTORY OF KIDNEY TRANSPLANT: ICD-10-CM

## 2018-07-09 DIAGNOSIS — F41.1 GAD (GENERALIZED ANXIETY DISORDER): ICD-10-CM

## 2018-07-09 DIAGNOSIS — I10 ESSENTIAL HYPERTENSION: ICD-10-CM

## 2018-07-09 DIAGNOSIS — Z87.39 HISTORY OF GOUT: ICD-10-CM

## 2018-07-09 DIAGNOSIS — R79.89 ELEVATED SERUM CREATININE: ICD-10-CM

## 2018-07-09 PROCEDURE — 80053 COMPREHEN METABOLIC PANEL: CPT | Mod: 90 | Performed by: FAMILY MEDICINE

## 2018-07-09 PROCEDURE — 86140 C-REACTIVE PROTEIN: CPT | Mod: 90 | Performed by: FAMILY MEDICINE

## 2018-07-09 PROCEDURE — 84550 ASSAY OF BLOOD/URIC ACID: CPT | Mod: 90 | Performed by: FAMILY MEDICINE

## 2018-07-09 PROCEDURE — 99215 OFFICE O/P EST HI 40 MIN: CPT | Performed by: FAMILY MEDICINE

## 2018-07-09 PROCEDURE — 82550 ASSAY OF CK (CPK): CPT | Mod: 90 | Performed by: FAMILY MEDICINE

## 2018-07-09 PROCEDURE — 85651 RBC SED RATE NONAUTOMATED: CPT | Performed by: FAMILY MEDICINE

## 2018-07-09 PROCEDURE — 36415 COLL VENOUS BLD VENIPUNCTURE: CPT | Performed by: FAMILY MEDICINE

## 2018-07-09 PROCEDURE — 85025 COMPLETE CBC W/AUTO DIFF WBC: CPT | Performed by: FAMILY MEDICINE

## 2018-07-09 RX ORDER — TACROLIMUS 1 MG/1
CAPSULE ORAL
COMMUNITY
Start: 2018-04-08 | End: 2018-07-16

## 2018-07-09 RX ORDER — BUPROPION HYDROCHLORIDE 150 MG/1
150 TABLET ORAL EVERY MORNING
Qty: 30 TABLET | Refills: 1 | Status: SHIPPED | OUTPATIENT
Start: 2018-07-09 | End: 2018-08-16

## 2018-07-09 ASSESSMENT — PATIENT HEALTH QUESTIONNAIRE - PHQ9: 5. POOR APPETITE OR OVEREATING: NEARLY EVERY DAY

## 2018-07-09 ASSESSMENT — ANXIETY QUESTIONNAIRES
IF YOU CHECKED OFF ANY PROBLEMS ON THIS QUESTIONNAIRE, HOW DIFFICULT HAVE THESE PROBLEMS MADE IT FOR YOU TO DO YOUR WORK, TAKE CARE OF THINGS AT HOME, OR GET ALONG WITH OTHER PEOPLE: VERY DIFFICULT
2. NOT BEING ABLE TO STOP OR CONTROL WORRYING: NEARLY EVERY DAY
7. FEELING AFRAID AS IF SOMETHING AWFUL MIGHT HAPPEN: NEARLY EVERY DAY
GAD7 TOTAL SCORE: 21
6. BECOMING EASILY ANNOYED OR IRRITABLE: NEARLY EVERY DAY
1. FEELING NERVOUS, ANXIOUS, OR ON EDGE: NEARLY EVERY DAY
3. WORRYING TOO MUCH ABOUT DIFFERENT THINGS: NEARLY EVERY DAY
5. BEING SO RESTLESS THAT IT IS HARD TO SIT STILL: NEARLY EVERY DAY

## 2018-07-09 NOTE — MR AVS SNAPSHOT
After Visit Summary   7/9/2018    Nani Ford    MRN: [id number]           Patient Information     Date Of Birth          1965        Visit Information        Provider Department      7/9/2018 9:15 AM Sara Castellano MD [facility]        Today's Diagnoses     Essential hypertension    -  1    Severe episode of recurrent major depressive disorder, without psychotic features (H)        Hyperlipidemia with target LDL less than 100        Status post liver transplantation (H)        History of gout        History of kidney transplant        Elevated serum creatinine        Nonintractable headache, unspecified chronicity pattern, unspecified headache type        MIMI (generalized anxiety disorder)        Fibromyalgia          Care Instructions    Please cut your metoprolol pills in half and take just 1/2 every morning. Please take this med off auto refill.     For your Ambien, let's break these in half and take just 1/2 pill. If not asleep in 30 mins, then take the second half of the pill.     Bring in your test tube for [facility] labs and let's these today. If you are fasting, can check cholesterol today.     If headaches do not go away after the change in your metoprolol today, let me know. You should see a difference in a couple days.           Follow-ups after your visit        Additional Services     MENTAL HEALTH REFERRAL  - Adult; Outpatient Treatment; Individual/Couples/Family/Group Therapy/Health Psychology; [facility]: [facility] [phone]; We will contact you to schedule the appointment or please call with any questions       All scheduling is subject to the client's specific insurance plan & benefits, provider/location availability, and provider clinical specialities.  Please arrive 15 minutes early for your first appointment and bring your completed paperwork.    Please be aware that coverage of these services is subject to the terms and limitations of  "your health insurance plan.  Call member services at your health plan with any benefit or coverage questions.                            Future tests that were ordered for you today     Open Future Orders        Priority Expected Expires Ordered    Lipid Panel ([facility]) Routine 7/15/2018 10/6/2018 7/9/2018            Who to contact     If you have questions or need follow up information about today's clinic visit or your schedule please contact [facility] directly at [phone].  Normal or non-critical lab and imaging results will be communicated to you by SPARQCodehart, letter or phone within 4 business days after the clinic has received the results. If you do not hear from us within 7 days, please contact the clinic through Softheon or phone. If you have a critical or abnormal lab result, we will notify you by phone as soon as possible.  Submit refill requests through Softheon or call your pharmacy and they will forward the refill request to us. Please allow 3 business days for your refill to be completed.          Additional Information About Your Visit        Softheon Information     Softheon gives you secure access to your electronic health record. If you see a primary care provider, you can also send messages to your care team and make appointments. If you have questions, please call your primary care clinic.  If you do not have a primary care provider, please call [phone] and they will assist you.        Care EveryWhere ID     This is your Care EveryWhere ID. This could be used by other organizations to access your [place] medical records  [id number]        Your Vitals Were     Pulse Respirations Height Pulse Oximetry BMI (Body Mass Index)       53 16 1.562 m (5' 1.5\") 99% 36.21 kg/m2        Blood Pressure from Last 3 Encounters:   07/09/18 118/82   03/28/18 112/80   12/13/17 122/62    Weight from Last 3 Encounters:   07/09/18 88.4 kg (194 lb 12.8 oz)   03/28/18 86.2 kg (190 lb)   12/13/17 87.5 " kg (193 lb)              We Performed the Following     C-Reactive Protein  Quant ([facility])     CBC with Diff/Plt ([facility])     Comp. Metabolic Panel (14) ([facility])     Creatine Kinase Total Serum ([facility])     MENTAL HEALTH REFERRAL  - Adult; Outpatient Treatment; Individual/Couples/Family/Group Therapy/Health Psychology; [facility]: [facility] [phone]; We will contact you to schedule the appointment or please call with any questions     Sed Rate Westergren ([facility])     Uric Acid  Serum ([facility])          Today's Medication Changes          These changes are accurate as of 7/9/18 10:11 AM.  If you have any questions, ask your nurse or doctor.               Start taking these medicines.        Dose/Directions    buPROPion 150 MG 24 hr tablet   Commonly known as:  WELLBUTRIN XL   Used for:  Severe episode of recurrent major depressive disorder, without psychotic features (H)   Started by:  Sara Castellano MD        Dose:  150 mg   Take 1 tablet (150 mg) by mouth every morning   Quantity:  30 tablet   Refills:  1         Stop taking these medicines if you haven't already. Please contact your care team if you have questions.     nortriptyline 25 MG capsule   Commonly known as:  PAMELOR   Stopped by:  Sara Castellano MD                Where to get your medicines      These medications were sent to [facility] [address] [place] [address] [address] [place] [address] & [address] [address]    Hours:  24-hours Phone:  [phone]     buPROPion 150 MG 24 hr tablet                Primary Care Provider Office Phone # Fax #    Sara Castellano [phone] [address] [place] [address]        Equal Access to Services     [facility] Hospital[facility] AH: Domingo Ellis, wachanida jonathan, qaybta kaalyoselin griffiths, kiley esposito. So [facility] [phone].    ATENCIÓN: Si habla español, tiene a valencia disposición  servicios gratuitos de asistencia lingüística. Rina saravia [phone].    We comply with applicable federal civil rights laws and [place] laws. We do not discriminate on the basis of race, color, national origin, age, disability, sex, sexual orientation, or gender identity.            Thank you!     Thank you for choosing [facility]  for your care. Our goal is always to provide you with excellent care. Hearing back from our patients is one way we can continue to improve our services. Please take a few minutes to complete the written survey that you may receive in the mail after your visit with us. Thank you!             Your Updated Medication List - Protect others around you: Learn how to safely use, store and throw away your medicines at www.disposemymeds.org.          This list is accurate as of 7/9/18 10:11 AM.  Always use your most recent med list.                   Brand Name Dispense Instructions for use Diagnosis    allopurinol 100 MG tablet    ZYLOPRIM    30 tablet    Take 1 tablet by mouth daily.    Gout, chronic       amLODIPine 5 MG tablet    NORVASC    90 tablet    Take 1 tablet by mouth daily    Encounter for medication refill       aspirin 81 MG tablet      Take 81 mg by mouth daily.        buPROPion 150 MG 24 hr tablet    WELLBUTRIN XL    30 tablet    Take 1 tablet (150 mg) by mouth every morning    Severe episode of recurrent major depressive disorder, without psychotic features (H)       Calcium Carb-Cholecalciferol 600-800 MG-UNIT Tabs      Take 1 tablet by mouth        DULoxetine 60 MG EC capsule    CYMBALTA    90 capsule    Take 1 capsule (60 mg) by mouth daily    Major depressive disorder, recurrent episode, moderate (H)       metoprolol succinate 25 MG 24 hr tablet    TOPROL-XL     Take 25 mg by mouth daily.        mycophenolate 250 MG capsule    GENERIC EQUIVALENT          omeprazole 40 MG capsule    priLOSEC    180 capsule    TAKE 1 CAPSULE(40 MG) BY MOUTH TWICE DAILY     Gastroesophageal reflux disease without esophagitis       predniSONE 5 MG tablet    DELTASONE     Take 1 tablet (5 mg) by mouth daily    Scleroderma (H)       tacrolimus 1 MG capsule    GENERIC EQUIVALENT          zolpidem 10 MG tablet    AMBIEN    30 tablet    TAKE 1 TABLET BY MOUTH EVERY DAY AT BEDTIME    Encounter for long-term (current) use of medications

## 2018-07-09 NOTE — LETTER
"[facility]  [address] [place]  [address]  Phone: [phone]    July 13, 2018      Nani Ford  [address] [facility] [address]              Dear Nani,    The results from your recent visit showed your labs look good overall - no obvious reason for your headaches.     Sincerely,     Sara \"Amirah\" MD [facility]    Results for orders placed or performed in visit on 07/09/18   Uric Acid  Serum ([facility])   Result Value Ref Range    Uric Acid 5.7 2.5 - 7.1 mg/dL    Narrative    Performed at:  01 - [facility] [address], [place], [place]  [id number]  : Denilson Puckett MD, Phone:  [phone]   CBC with Diff/Plt ([facility])   Result Value Ref Range    WBC x10/cmm 8.0 3.8 - 11.0 x10/cmm    % Lymphocytes 25.0 20.5 - 51.1 %    % Monocytes 7.0 1.7 - 9.3 %    % Granulocytes 68.0 42.2 - 75.2 %    RBC x10/cmm 5.08 3.7 - 5.2 x10/cmm    Hemoglobin 14.2 11.8 - 15.5 g/dl    Hematocrit 43.0 35 - 46 %    MCV 84.6 80 - 100 fL    MCH 28.0 27.0 - 31.0 pg    MCHC 33.1 33.0 - 37.0 g/dL    Platelet Count 243 140 - 450 K/uL   Comp. Metabolic Panel (14) ([facility])   Result Value Ref Range    Glucose 83 65 - 99 mg/dL    Urea Nitrogen 36 (H) 6 - 24 mg/dL    Creatinine 1.34 (H) 0.57 - 1.00 mg/dL    eGFR If NonAfricn Am 46 (L) >59 mL/min/1.73    eGFR If Africn Am 53 (L) >59 mL/min/1.73    BUN/Creatinine Ratio 27 (H) 9 - 23    Sodium 141 134 - 144 mmol/L    Potassium 4.6 3.5 - 5.2 mmol/L    Chloride 104 96 - 106 mmol/L    Total CO2 23 20 - 29 mmol/L    Calcium 9.2 8.7 - 10.2 mg/dL    Protein Total 6.9 6.0 - 8.5 g/dL    Albumin 4.1 3.5 - 5.5 g/dL    Globulin, Total 2.8 1.5 - 4.5 g/dL    A/G Ratio 1.5 1.2 - 2.2    Bilirubin Total <0.2 0.0 - 1.2 mg/dL    Alkaline Phosphatase 49 39 - 117 IU/L    AST 16 0 - 40 IU/L    ALT 13 0 - 32 IU/L    Narrative    Performed at:  01 - [facility] [address], [place], [place]  [id number]  : Denilson Puckett MD, Phone:  [phone]   Sed Rate " Westergren ([facility])   Result Value Ref Range    Sed Rate 30 (A) 0 - 20 mm/hr   C-Reactive Protein  Quant ([facility])   Result Value Ref Range    C-Reactive Protein 2.9 0.0 - 4.9 mg/L    Narrative    Performed at:  01 - [facility] [address], [place], [place]  [id number]  : Denilson Puckett MD, Phone:  [phone]   Creatine Kinase Total Serum ([facility])   Result Value Ref Range    CK Total 71 24 - 173 U/L    Narrative    Performed at:  01 - [facility] [address], [place], [place]  [id number]  : Denilson Puckett MD, Phone:  [phone]

## 2018-07-09 NOTE — PATIENT INSTRUCTIONS
Please cut your metoprolol pills in half and take just 1/2 every morning. Please take this med off auto refill.     For your Ambien, let's break these in half and take just 1/2 pill. If not asleep in 30 mins, then take the second half of the pill.     Bring in your test tube for [facility] labs and let's these today. If you are fasting, can check cholesterol today.     If headaches do not go away after the change in your metoprolol today, let me know. You should see a difference in a couple days.

## 2018-07-09 NOTE — PROGRESS NOTES
Problem(s) Oriented visit      SUBJECTIVE:                                                    Nani Ford is a 52 year old female who presents to clinic today for:  Patient presents with:  Recheck Medication    Pt is well known to [facility]; prior PCP Dr. Evelio Torrez retired 4/2018. Pt is new to me today.     Pt has hx of liver transplant at [facility] 1990 and liver and kidney xplant 2012. 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 She takes her anti-rejection drugs regularly.  She receives transplant care at [facility]. Those records reviewed by me today.     Has fibromyalgia. Has CSA on file from Dr. Kelly - signed 9/10/2015 for Oxycodone 10 mg QID #10 w/ required visit to clinic q 2 months. No recent entries in [place] Prescription Monitoring DB. Meds for fibromyalgia - no longer taking oxycodone. Took pain meds for year. Stopped due to risks of meds. She agrees this contract is no longer needed or necessary. She still has fibromyalgia pain, but prefers not to take anything for it.     Sleep - chronic insomnia - takes 10 mg Ambien every night. Up and down all night down. ?Restless sleep. Not on any other meds for sleep. Was not aware recommended dose for females is 5 mg qhs. She has been on 10 mg for a long time. No sleep study on file.     On very high dose of PPI - 40 mg BID. Had a scope - w/ esophagitis. Now on PPI for life. Med usually takes care of GERD/esophagitis symptoms.     Migraines - wakes up and go to bed w/ them. Has them daily. Occas very strong. Needs to lie down. Some days worse than others. Occas light-headedness. Everything will start to sway. Not taking anything for these yet. ? If from her anti-rejection drugs. Not on meds specifically for migraines. Both BP and pulse are low nml today. She is on a beta blocker.     PHQ-9 SCORE 12/13/2017 3/28/2018 7/9/2018   Total Score 20 15 21     MIMI-7 SCORE 12/13/2017 3/28/2018 7/9/2018   Total Score 19 14 21     Depressed and anxious about current medical  situation. On Cymbalta 60 mg daily. Not sure it is helpful any longer. No known side effects. Not on any other meds for moods. She is also on prednisone chronically due to transplant status. Not currently in therapy; open to the idea of this. Has considered if she would be better off dead, but denies any plan or active ideation. Just becomes overwhelmed due to her medical condition.     Problem list, Medication list, Allergies, and Medical/Social/Surgical histories reviewed in [facility] and updated as appropriate.     ROS:  10 point ROS completed and negative except noted above, including Gen, HEENT, CV, Resp, GI, , MS, Neurologic, Psych    Histories:   Patient Active Problem List   Diagnosis     History of liver transplant (H)     HTN (hypertension)     History of kidney transplant     Hyperlipidemia with target LDL less than 100     History of gout     Anemia     Scleroderma, diffuse (H)     Raynaud phenomenon     Health Care Home     Fibromyalgia     Major depressive disorder, recurrent episode, moderate (H)     Past Medical History:   Diagnosis Date     Chronic kidney disease, stage IV (severe) (H)     [facility] fu      History of kidney transplant 12/17/2012     HTN (hypertension)      Hyperlipidaemia LDL goal < 100      Liver transplant     [facility] fu ,1-2 x year     Past Surgical History:   Procedure Laterality Date     BIOPSY       GI SURGERY       RETURN LIVER TRANSPLANT      21 years ago     TRANSPLANT       Social History   Substance Use Topics     Smoking status: Former Smoker     Packs/day: 0.30     Years: 30.00     Types: Cigarettes     Quit date: 10/30/2012     Smokeless tobacco: Never Used     Alcohol use No     Family History   Problem Relation Age of Onset     Substance Abuse Sister      Prescription Medications as of 7/16/2018             allopurinol (ZYLOPRIM) 100 MG tablet Take 1 tablet by mouth daily.    amLODIPine (NORVASC) 5 MG tablet Take 1 tablet by mouth daily    aspirin 81 MG tablet  "Take 81 mg by mouth daily.    buPROPion (WELLBUTRIN XL) 150 MG 24 hr tablet Take 1 tablet (150 mg) by mouth every morning    butalbital-acetaminophen-caffeine (FIORICET/ESGIC) -40 MG per tablet Take 1 tablet by mouth every 4 hours as needed    Calcium Carb-Cholecalciferol 600-800 MG-UNIT TABS Take 1 tablet by mouth    DULoxetine (CYMBALTA) 60 MG EC capsule Take 1 capsule (60 mg) by mouth daily    metoprolol (TOPROL-XL) 25 MG 24 hr tablet Take 25 mg by mouth daily.    mycophenolate (CELLCEPT - GENERIC EQUIVALENT) 250 MG capsule     omeprazole (PRILOSEC) 40 MG capsule TAKE 1 CAPSULE(40 MG) BY MOUTH TWICE DAILY    predniSONE (DELTASONE) 5 MG tablet Take 1 tablet (5 mg) by mouth daily    tacrolimus (GENERIC EQUIVALENT) 1 MG capsule     zolpidem (AMBIEN) 10 MG tablet TAKE 1 TABLET BY MOUTH EVERY DAY AT BEDTIME        OBJECTIVE:                                                    /82  Pulse 53  Resp 16  Ht 1.562 m (5' 1.5\")  Wt 88.4 kg (194 lb 12.8 oz)  SpO2 99%  BMI 36.21 kg/m2  Body mass index is 36.21 kg/(m^2).   Gen: Cooperative. No acute distress. Obese body habitus noted. Appears older than stated age.   Eyes: PERRL, sclera white.   Ears/Nose/Mouth/Throat: Normal pinnae and ear canals, TMs without erythema or effusion. Oropharynx mucous membranes moist, without lesions, erythema, or exudate.   Neck: Supple, without masses, lymphadenopathy or tenderness.   Heart: Regular rate and rhythm without murmurs, rubs, or gallops.   Lungs: Normal respiratory effort. Lungs are clear to auscultation. Good breath sounds bilaterally.   Musculoskeletal: No lower extremity edema.  Skin: Skin cool to the touch. A little ashen in color.   Neurologic: Alert, oriented x3, nonfocal. CN II-XII grossly intact. Strength 5/5. Sensation intact.   Psych:  Mood and affect are depressed appearing. Anxiety is not as obvious.        ASSESSMENT/PLAN:                                                      Nani was seen today for " recheck medication. Not fasting today. Will RTC tomorrow in fasting state and will bring tube/paperwork she needs to have her anti-rejection labs drawn for [place]. Specifically, needs Tacrolimus level.     Diagnoses and all orders for this visit:    Severe episode of recurrent major depressive disorder, without psychotic features (H)  -     MENTAL HEALTH REFERRAL  - Adult; Outpatient Treatment; Individual/Couples/Family/Group Therapy/Health Psychology; [facility] [place]: [facility] [phone]; We will contact you to schedule the appointment or please call with any questions  -     buPROPion (WELLBUTRIN XL) 150 MG 24 hr tablet; Take 1 tablet (150 mg) by mouth every morning   Unlikely to get significant benefit increasing Cymbalta from 60 to 90 or 120.    Will add trial of Wellbutrin and may titrate upward. No seizure hx and no eating d/o hx by pt report.    I do not think she is at risk for self harm. We discuss this quite openly today.    She contracts for safety and short interval follow-up     Status post liver transplantation (H)  -     CBC with Diff/Plt ([facility])  -     Comp. Metabolic Panel (14) ([facility])  -     Sed Rate Westergren ([facility])  -     Creatine Kinase Total Serum ([facility])    Hyperlipidemia with target LDL less than 100  -     Comp. Metabolic Panel (14) ([facility])  -     Lipid Panel ([facility]); Future  -     Creatine Kinase Total Serum ([facility])    Essential hypertension  -     Comp. Metabolic Panel (14) ([facility])  -     C-Reactive Protein  Quant ([facility])    History of gout  -     Uric Acid  Serum ([facility])  -     Comp. Metabolic Panel (14) ([facility])    History of kidney transplant  -     CBC with Diff/Plt ([facility])  -     Comp. Metabolic Panel (14) ([facility])  -     Sed Rate Westergren ([facility])  -     C-Reactive Protein  Quant ([facility])    Elevated serum creatinine  -     Comp. Metabolic Panel (14) ([facility])    Nonintractable headache, unspecified chronicity pattern, unspecified headache type   Etiology  unclear. Will try decreasing metoprolol now. May need Neurology assistance and/or imaging.     MIMI (generalized anxiety disorder)  -     MENTAL HEALTH REFERRAL  - Adult; Outpatient Treatment; Individual/Couples/Family/Group Therapy/Health Psychology; [facility] [place]: [facility] [phone]; We will contact you to schedule the appointment or please call with any questions    Fibromyalgia   She is aware to stay active. We agree tx'ing w/ oxycodone is not indicated now.     RTC end of the week. I am very concerned about her mood. She agrees.     >40 min spent with patient, greater than 50% spent on discussion/education/planning, etc. About The primary encounter diagnosis was Severe episode of recurrent major depressive disorder, without psychotic features (H). Diagnoses of Status post liver transplantation (H), Hyperlipidemia with target LDL less than 100, Essential hypertension, History of gout, History of kidney transplant, Elevated serum creatinine, Nonintractable headache, unspecified chronicity pattern, unspecified headache type, MIMI (generalized anxiety disorder), and Fibromyalgia were also pertinent to this visit.      Patient Instructions   Please cut your metoprolol pills in half and take just 1/2 every morning. Please take this med off auto refill.     For your Ambien, let's break these in half and take just 1/2 pill. If not asleep in 30 mins, then take the second half of the pill.     Bring in your test tube for [place] labs and let's these today. If you are fasting, can check cholesterol today.     If headaches do not go away after the change in your metoprolol today, let me know. You should see a difference in a couple days.     The following health maintenance items are reviewed in Epic and correct as of today:  Health Maintenance   Topic Date Due     HIV SCREEN (SYSTEM ASSIGNED)  09/25/1985     INFLUENZA VACCINE (1) 09/01/2018     PHQ-9 Q6 MONTHS  01/09/2019     COLON CANCER SCREEN (SYSTEM ASSIGNED)   01/12/2019     DEPRESSION ACTION PLAN Q1 YR  03/28/2019     MIMI QUESTIONNAIRE 1 YEAR  07/09/2019     MAMMO SCREEN Q2 YR (SYSTEM ASSIGNED)  11/15/2019     TETANUS IMMUNIZATION (SYSTEM ASSIGNED)  11/15/2021     LIPID SCREEN Q5 YR FEMALE (SYSTEM ASSIGNED)  11/14/2022       Sara Castellano MD  Family Medicine    For any issues, please call my office  [facility] at [phone].

## 2018-07-10 LAB
% GRANULOCYTES: 68 % (ref 42.2–75.2)
ERYTHROCYTE [SEDIMENTATION RATE] IN BLOOD: 30 MM/HR (ref 0–20)
HCT VFR BLD AUTO: 43 % (ref 35–46)
HEMOGLOBIN: 14.2 G/DL (ref 11.8–15.5)
LYMPHOCYTES NFR BLD AUTO: 25 % (ref 20.5–51.1)
MCH RBC QN AUTO: 28 PG (ref 27–31)
MCHC RBC AUTO-ENTMCNC: 33.1 G/DL (ref 33–37)
MCV RBC AUTO: 84.6 FL (ref 80–100)
MONOCYTES NFR BLD AUTO: 7 % (ref 1.7–9.3)
PLATELET # BLD AUTO: 243 K/UL (ref 140–450)
RBC # BLD AUTO: 5.08 X10/CMM (ref 3.7–5.2)
WBC # BLD AUTO: 8 X10/CMM (ref 3.8–11)

## 2018-07-10 ASSESSMENT — PATIENT HEALTH QUESTIONNAIRE - PHQ9: SUM OF ALL RESPONSES TO PHQ QUESTIONS 1-9: 21

## 2018-07-10 ASSESSMENT — ANXIETY QUESTIONNAIRES: GAD7 TOTAL SCORE: 21

## 2018-07-11 LAB
ALBUMIN SERPL-MCNC: 4.1 G/DL (ref 3.5–5.5)
ALBUMIN/GLOB SERPL: 1.5 {RATIO} (ref 1.2–2.2)
ALP SERPL-CCNC: 49 IU/L (ref 39–117)
ALT SERPL-CCNC: 13 IU/L (ref 0–32)
AST SERPL-CCNC: 16 IU/L (ref 0–40)
BILIRUB SERPL-MCNC: <0.2 MG/DL (ref 0–1.2)
BUN SERPL-MCNC: 36 MG/DL (ref 6–24)
BUN/CREATININE RATIO: 27 (ref 9–23)
CALCIUM SERPL-MCNC: 9.2 MG/DL (ref 8.7–10.2)
CHLORIDE SERPLBLD-SCNC: 104 MMOL/L (ref 96–106)
CK SERPL-CCNC: 71 U/L (ref 24–173)
CREAT SERPL-MCNC: 1.34 MG/DL (ref 0.57–1)
CRP SERPL-MCNC: 2.9 MG/L (ref 0–4.9)
EGFR IF AFRICN AM: 53 ML/MIN/1.73
EGFR IF NONAFRICN AM: 46 ML/MIN/1.73
GLOBULIN, TOTAL: 2.8 G/DL (ref 1.5–4.5)
GLUCOSE SERPL-MCNC: 83 MG/DL (ref 65–99)
POTASSIUM SERPL-SCNC: 4.6 MMOL/L (ref 3.5–5.2)
PROT SERPL-MCNC: 6.9 G/DL (ref 6–8.5)
SODIUM SERPL-SCNC: 141 MMOL/L (ref 134–144)
TOTAL CO2: 23 MMOL/L (ref 20–29)
URATE SERPL-MCNC: 5.7 MG/DL (ref 2.5–7.1)

## 2018-07-13 ENCOUNTER — OFFICE VISIT (OUTPATIENT)
Dept: FAMILY MEDICINE | Facility: CLINIC | Age: 53
End: 2018-07-13

## 2018-07-13 VITALS
SYSTOLIC BLOOD PRESSURE: 116 MMHG | DIASTOLIC BLOOD PRESSURE: 68 MMHG | WEIGHT: 196.4 LBS | HEART RATE: 62 BPM | RESPIRATION RATE: 16 BRPM | BODY MASS INDEX: 36.51 KG/M2

## 2018-07-13 DIAGNOSIS — M62.89 MUSCLE TIGHTNESS: ICD-10-CM

## 2018-07-13 DIAGNOSIS — G47.00 INSOMNIA, UNSPECIFIED TYPE: ICD-10-CM

## 2018-07-13 DIAGNOSIS — Z94.4 HISTORY OF LIVER TRANSPLANT (H): ICD-10-CM

## 2018-07-13 DIAGNOSIS — F39 MOOD DISORDER (H): ICD-10-CM

## 2018-07-13 DIAGNOSIS — R51.9 NONINTRACTABLE HEADACHE, UNSPECIFIED CHRONICITY PATTERN, UNSPECIFIED HEADACHE TYPE: Primary | ICD-10-CM

## 2018-07-13 DIAGNOSIS — Z94.0 HISTORY OF KIDNEY TRANSPLANT: ICD-10-CM

## 2018-07-13 DIAGNOSIS — M79.7 FIBROMYALGIA: ICD-10-CM

## 2018-07-13 PROCEDURE — 99214 OFFICE O/P EST MOD 30 MIN: CPT | Performed by: FAMILY MEDICINE

## 2018-07-13 RX ORDER — BUTALBITAL, ACETAMINOPHEN AND CAFFEINE 50; 325; 40 MG/1; MG/1; MG/1
1 TABLET ORAL EVERY 4 HOURS PRN
Qty: 30 TABLET | Refills: 0 | Status: SHIPPED | OUTPATIENT
Start: 2018-07-13 | End: 2019-01-18

## 2018-07-13 NOTE — PROGRESS NOTES
"Problem(s) Oriented visit      SUBJECTIVE:                                                    Nani Ford is a 52 year old female who presents to clinic today for:  Patient presents with:  RECHECK    Here to follow-up her lab tests. We collected numerous at her last visit due to kidney and liver transplant. She was concerned about her results. Her tacrolimus level was drawn in the tube she provided and was sent to [facility] - however, I do not see the results in our version of Epic or [facility].    Mood has been a little better since we started Wellbutrin earlier in the week. She's more hopeful now since learning her labs all looked good. PHQ and MIMI not done today. No known side effects from Wellbutrin. Willing to continue taking this. Is aware it will take some time to get up to a therapeutic level.     She tried cutting her Ambien dose into halves and taking 30 minutes apart. Not successful in cutting her overall dose. Still req'd the full 10 mg every night. Does not thinks she snores at night - no known overnight O2 sat data available to assess if a contributor to her HAs.     Today, she is concerned she has been having intermittent HAs. No change since cutting her metoprolol dose in half as directed earlier this week. Has had HAs in the past and were managed by \"some medication that begins with an 'F'\" but cannot recall the name of the med. HAs are not debilitating but bothersome. Does not wake up with a HA. Pain/pressure begins on the top of her head and works its way backward toward the nape of her neck. Does not think she is particularly stressed out now. She has concurrent dx of fibromyalgia. Had a pain contract in the past with Dr. Kelly - not on any meds chronically now require a pain contract (Dr. Kelly is also retired). Nature of HAs not getting worse. No n/v or photophobia. They do slow her down and frustrate her - making depression/anxiety worse.     Problem list, " Medication list, Allergies, and Medical/Social/Surgical histories reviewed in [facility] and updated as appropriate.     ROS:  10 point ROS completed and negative except noted above, including Gen, HEENT, CV, Resp, GI, , MS, Neurologic, Psych    Histories:   Patient Active Problem List   Diagnosis     History of liver transplant (H)     HTN (hypertension)     History of kidney transplant     Hyperlipidemia with target LDL less than 100     History of gout     Anemia     Scleroderma, diffuse (H)     Raynaud phenomenon     Health Care Home     Fibromyalgia     Major depressive disorder, recurrent episode, moderate (H)     Insomnia, unspecified type     Past Medical History:   Diagnosis Date     Chronic kidney disease, stage IV (severe) (H)     [facility] fu      History of kidney transplant 12/17/2012     HTN (hypertension)      Hyperlipidaemia LDL goal < 100      Liver transplant     [facility] fu ,1-2 x year     Past Surgical History:   Procedure Laterality Date     BIOPSY       GI SURGERY       RETURN LIVER TRANSPLANT      21 years ago     TRANSPLANT       Social History   Substance Use Topics     Smoking status: Former Smoker     Packs/day: 0.30     Years: 30.00     Types: Cigarettes     Quit date: 10/30/2012     Smokeless tobacco: Never Used     Alcohol use No     Family History   Problem Relation Age of Onset     Substance Abuse Sister      OBJECTIVE:                                                    /68  Pulse 62  Resp 16  Wt 89.1 kg (196 lb 6.4 oz)  BMI 36.51 kg/m2  Body mass index is 36.51 kg/(m^2).   Gen: Cooperative. No acute distress. Obese body habitus noted.   Eyes: PERRL, sclera white.   Neck:  Muscles in posterior neck are tight.   Heart: Regular rate and rhythm without murmurs, rubs, or gallops.   Lungs: Breathing easily.   Musculoskeletal: No lower extremity edema.   Skin: Warm and well perfused.   Neurologic: Alert, oriented x3, nonfocal. CN II-XII grossly intact.   Psych:  Mood and affect  are appropriate to discussion today.      ASSESSMENT/PLAN:                                                      Nani was seen today for recheck.    Diagnoses and all orders for this visit:    Nonintractable headache, unspecified chronicity pattern, unspecified headache type  -     butalbital-acetaminophen-caffeine (FIORICET/ESGIC) -40 MG per tablet; Take 1 tablet by mouth every 4 hours as needed    History of liver transplant (H)    History of kidney transplant    Fibromyalgia    Muscle tightness    Mood disorder (H)    Insomnia, unspecified type    We would both like to see improvement in HAs w/out meds if possible.   I suspect at least some element due to tight muscles in her neck and upper back.   If HAs do not improve, will consider imaging given her immunosuppression status. No brain imaging on file w/in [facility] and nothing in at least 5 yrs at [place] via [facility].   May also need to get neurology involved.   We should also consider sleep study or least overnight O2 oxymetry as hypoxia could contribute to HAs and she is on high dose Ambien - likely now dependent on this given long-term use.     Patient Instructions   Try stopping Metoprolol entirely. See if it helps your headache. It's possible your headache is from having too low of a blood pressure.   If no change in a week, then resume at 1/2 pill a day.     We'll a short of Fiorcet for your headache. This is a controlled substance.   Do not mix it with Ambien - separate it by several hours to prevent oversedation.     Try using a rolled bath towel behind your neck and folded bath towel behind your upper back in a warm bath alexei (no additives).     Please see me in 3-4 weeks and we'll reassess your mood and pain and headaches then.       >25 min spent with patient, greater than 50% spent on discussion/education/planning, etc. About The primary encounter diagnosis was Nonintractable headache, unspecified chronicity pattern, unspecified  headache type. Diagnoses of History of liver transplant (H), History of kidney transplant, Fibromyalgia, Muscle tightness, Mood disorder (H), and Insomnia, unspecified type were also pertinent to this visit.    The following health maintenance items are reviewed in Epic and correct as of today:  Health Maintenance   Topic Date Due     HIV SCREEN (SYSTEM ASSIGNED)  09/25/1985     INFLUENZA VACCINE (1) 09/01/2018     PHQ-9 Q6 MONTHS  01/09/2019     COLON CANCER SCREEN (SYSTEM ASSIGNED)  01/12/2019     DEPRESSION ACTION PLAN Q1 YR  03/28/2019     MMII QUESTIONNAIRE 1 YEAR  07/09/2019     MAMMO SCREEN Q2 YR (SYSTEM ASSIGNED)  11/15/2019     TETANUS IMMUNIZATION (SYSTEM ASSIGNED)  11/15/2021     LIPID SCREEN Q5 YR FEMALE (SYSTEM ASSIGNED)  11/14/2022       Sara Castellano MD  Family Medicine    For any issues, please call my office  [facility] at [phone].

## 2018-07-13 NOTE — PATIENT INSTRUCTIONS
Try stopping Metoprolol entirely. See if it helps your headache. It's possible your headache is from having too low of a blood pressure.   If no change in a week, then resume at 1/2 pill a day.     We'll a short of Fiorcet for your headache. This is a controlled substance.   Do not mix it with Ambien - separate it by several hours to prevent oversedation.     Try using a rolled bath towel behind your neck and folded bath towel behind your upper back in a warm bath alexei (no additives).     Please see me in 3-4 weeks and we'll reassess your mood and pain and headaches then.

## 2018-07-13 NOTE — MR AVS SNAPSHOT
After Visit Summary   7/13/2018    Nani Ford    MRN: [id number]           Patient Information     Date Of Birth          1965        Visit Information        Provider Department      7/13/2018 12:30 PM Sara Castellano MD [facility]        Today's Diagnoses     Nonintractable headache, unspecified chronicity pattern, unspecified headache type    -  1    History of liver transplant (H)        History of kidney transplant        Fibromyalgia        Muscle tightness        Mood disorder (H)          Care Instructions    Try stopping Metoprolol entirely. See if it helps your headache. It's possible your headache is from having too low of a blood pressure.   If no change in a week, then resume at 1/2 pill a day.     We'll a short of Fiorcet for your headache. This is a controlled substance.   Do not mix it with Ambien - separate it by several hours to prevent oversedation.     Try using a rolled bath towel behind your neck and folded bath towel behind your upper back in a warm bath alexei (no additives).     Please see me in 3-4 weeks and we'll reassess your mood and pain and headaches then.               Follow-ups after your visit        Who to contact     If you have questions or need follow up information about today's clinic visit or your schedule please contact [facility] directly at [phone].  Normal or non-critical lab and imaging results will be communicated to you by MyChart, letter or phone within 4 business days after the clinic has received the results. If you do not hear from us within 7 days, please contact the clinic through MyChart or phone. If you have a critical or abnormal lab result, we will notify you by phone as soon as possible.  Submit refill requests through [employer]ocket or call your pharmacy and they will forward the refill request to us. Please allow 3 business days for your refill to be completed.          Additional Information About  Your Visit        [employer] Information     [employer] gives you secure access to your electronic health record. If you see a primary care provider, you can also send messages to your care team and make appointments. If you have questions, please call your primary care clinic.  If you do not have a primary care provider, please call [phone] and they will assist you.        Care EveryWhere ID     This is your Care EveryWhere ID. This could be used by other organizations to access your [place] medical records  [id number]        Your Vitals Were     Pulse Respirations BMI (Body Mass Index)             62 16 36.51 kg/m2          Blood Pressure from Last 3 Encounters:   07/13/18 116/68   07/09/18 118/82   03/28/18 112/80    Weight from Last 3 Encounters:   07/13/18 89.1 kg (196 lb 6.4 oz)   07/09/18 88.4 kg (194 lb 12.8 oz)   03/28/18 86.2 kg (190 lb)              Today, you had the following     No orders found for display         Today's Medication Changes          These changes are accurate as of 7/13/18  1:04 PM.  If you have any questions, ask your nurse or doctor.               Start taking these medicines.        Dose/Directions    butalbital-acetaminophen-caffeine -40 MG per tablet   Commonly known as:  FIORICET/ESGIC   Used for:  Nonintractable headache, unspecified chronicity pattern, unspecified headache type   Started by:  Sara Castellano MD        Dose:  1 tablet   Take 1 tablet by mouth every 4 hours as needed   Quantity:  30 tablet   Refills:  0            Where to get your medicines      Some of these will need a paper prescription and others can be bought over the counter.  Ask your nurse if you have questions.     Bring a paper prescription for each of these medications     butalbital-acetaminophen-caffeine -40 MG per tablet                Primary Care Provider Office Phone # Fax #    Sara Castellano [phone] [address] [facility]  [address]        Equal Access to Services     [facility]: Hadii allen lui azeem Ellis, wachanida luqadaha, qaybta kaalmaindra griffiths, kiley esposito. So [facility] [phone].    ATENCIÓN: Si habla español, tiene a valencia disposición servicios gratuitos de asistencia lingüística. Jossame al [phone].    We comply with applicable federal civil rights laws and [place] laws. We do not discriminate on the basis of race, color, national origin, age, disability, sex, sexual orientation, or gender identity.            Thank you!     Thank you for choosing [facility]  for your care. Our goal is always to provide you with excellent care. Hearing back from our patients is one way we can continue to improve our services. Please take a few minutes to complete the written survey that you may receive in the mail after your visit with us. Thank you!             Your Updated Medication List - Protect others around you: Learn how to safely use, store and throw away your medicines at www.disposemymeds.org.          This list is accurate as of 7/13/18  1:04 PM.  Always use your most recent med list.                   Brand Name Dispense Instructions for use Diagnosis    allopurinol 100 MG tablet    ZYLOPRIM    30 tablet    Take 1 tablet by mouth daily.    Gout, chronic       amLODIPine 5 MG tablet    NORVASC    90 tablet    Take 1 tablet by mouth daily    Encounter for medication refill       aspirin 81 MG tablet      Take 81 mg by mouth daily.        buPROPion 150 MG 24 hr tablet    WELLBUTRIN XL    30 tablet    Take 1 tablet (150 mg) by mouth every morning    Severe episode of recurrent major depressive disorder, without psychotic features (H)       butalbital-acetaminophen-caffeine -40 MG per tablet    FIORICET/ESGIC    30 tablet    Take 1 tablet by mouth every 4 hours as needed    Nonintractable headache, unspecified chronicity pattern, unspecified headache type       Calcium  Carb-Cholecalciferol 600-800 MG-UNIT Tabs      Take 1 tablet by mouth        DULoxetine 60 MG EC capsule    CYMBALTA    90 capsule    Take 1 capsule (60 mg) by mouth daily    Major depressive disorder, recurrent episode, moderate (H)       metoprolol succinate 25 MG 24 hr tablet    TOPROL-XL     Take 25 mg by mouth daily.        mycophenolate 250 MG capsule    GENERIC EQUIVALENT          omeprazole 40 MG capsule    priLOSEC    180 capsule    TAKE 1 CAPSULE(40 MG) BY MOUTH TWICE DAILY    Gastroesophageal reflux disease without esophagitis       predniSONE 5 MG tablet    DELTASONE     Take 1 tablet (5 mg) by mouth daily    Scleroderma (H)       tacrolimus 1 MG capsule    GENERIC EQUIVALENT          zolpidem 10 MG tablet    AMBIEN    30 tablet    TAKE 1 TABLET BY MOUTH EVERY DAY AT BEDTIME    Encounter for long-term (current) use of medications

## 2018-07-16 PROBLEM — Z79.899 ENCOUNTER FOR LONG-TERM (CURRENT) USE OF MEDICATIONS: Status: RESOLVED | Noted: 2018-01-24 | Resolved: 2018-07-16

## 2018-07-16 RX ORDER — TACROLIMUS 0.5 MG/1
CAPSULE ORAL
Refills: 0 | COMMUNITY
Start: 2018-07-12 | End: 2024-06-04

## 2018-07-17 PROBLEM — G47.00 INSOMNIA, UNSPECIFIED TYPE: Status: ACTIVE | Noted: 2018-07-17

## 2018-07-20 ENCOUNTER — MYC MEDICAL ADVICE (OUTPATIENT)
Dept: FAMILY MEDICINE | Facility: CLINIC | Age: 53
End: 2018-07-20

## 2018-07-20 DIAGNOSIS — K62.5 RECTAL BLEEDING: Primary | ICD-10-CM

## 2018-07-30 ENCOUNTER — MYC MEDICAL ADVICE (OUTPATIENT)
Dept: FAMILY MEDICINE | Facility: CLINIC | Age: 53
End: 2018-07-30

## 2018-07-30 DIAGNOSIS — Z79.899 ENCOUNTER FOR LONG-TERM (CURRENT) USE OF MEDICATIONS: ICD-10-CM

## 2018-07-30 DIAGNOSIS — G47.01 INSOMNIA DUE TO MEDICAL CONDITION: Primary | ICD-10-CM

## 2018-07-30 RX ORDER — ZOLPIDEM TARTRATE 10 MG/1
TABLET ORAL
Qty: 30 TABLET | Refills: 0 | Status: SHIPPED | OUTPATIENT
Start: 2018-07-30 | End: 2018-08-27

## 2018-07-30 NOTE — TELEPHONE ENCOUNTER
PA was submitted to covermeds on 07/10/2018 for Zolpidem.  Did not hear response so checked today-07/30 and found out PA was approved.  Approval dates 06/10/2018-07/10/2019.  Called [facility] and patient to inform of approval.

## 2018-08-16 ENCOUNTER — OFFICE VISIT (OUTPATIENT)
Dept: FAMILY MEDICINE | Facility: CLINIC | Age: 53
End: 2018-08-16

## 2018-08-16 VITALS
HEART RATE: 77 BPM | WEIGHT: 190.6 LBS | OXYGEN SATURATION: 99 % | BODY MASS INDEX: 35.43 KG/M2 | DIASTOLIC BLOOD PRESSURE: 80 MMHG | RESPIRATION RATE: 16 BRPM | SYSTOLIC BLOOD PRESSURE: 132 MMHG

## 2018-08-16 DIAGNOSIS — Z94.4 HISTORY OF LIVER TRANSPLANT (H): ICD-10-CM

## 2018-08-16 DIAGNOSIS — R25.2 HAND CRAMPS: Primary | ICD-10-CM

## 2018-08-16 DIAGNOSIS — Z94.0 HISTORY OF KIDNEY TRANSPLANT: ICD-10-CM

## 2018-08-16 DIAGNOSIS — F33.1 MAJOR DEPRESSIVE DISORDER, RECURRENT EPISODE, MODERATE (H): ICD-10-CM

## 2018-08-16 DIAGNOSIS — I10 HYPERTENSION, UNSPECIFIED TYPE: ICD-10-CM

## 2018-08-16 LAB
% GRANULOCYTES: 83.5 % (ref 42.2–75.2)
HCT VFR BLD AUTO: 42 % (ref 35–46)
HEMOGLOBIN: 13.9 G/DL (ref 11.8–15.5)
LYMPHOCYTES NFR BLD AUTO: 12.7 % (ref 20.5–51.1)
MCH RBC QN AUTO: 28 PG (ref 27–31)
MCHC RBC AUTO-ENTMCNC: 33.2 G/DL (ref 33–37)
MCV RBC AUTO: 84.3 FL (ref 80–100)
MONOCYTES NFR BLD AUTO: 3.8 % (ref 1.7–9.3)
PLATELET # BLD AUTO: 236 K/UL (ref 140–450)
RBC # BLD AUTO: 4.98 X10/CMM (ref 3.7–5.2)
WBC # BLD AUTO: 9.1 X10/CMM (ref 3.8–11)

## 2018-08-16 PROCEDURE — 36415 COLL VENOUS BLD VENIPUNCTURE: CPT | Performed by: FAMILY MEDICINE

## 2018-08-16 PROCEDURE — 80050 GENERAL HEALTH PANEL: CPT | Mod: 90 | Performed by: FAMILY MEDICINE

## 2018-08-16 PROCEDURE — 84100 ASSAY OF PHOSPHORUS: CPT | Mod: 90 | Performed by: FAMILY MEDICINE

## 2018-08-16 PROCEDURE — 83550 IRON BINDING TEST: CPT | Mod: 90 | Performed by: FAMILY MEDICINE

## 2018-08-16 PROCEDURE — 82728 ASSAY OF FERRITIN: CPT | Mod: 90 | Performed by: FAMILY MEDICINE

## 2018-08-16 PROCEDURE — 99214 OFFICE O/P EST MOD 30 MIN: CPT | Performed by: FAMILY MEDICINE

## 2018-08-16 PROCEDURE — 83540 ASSAY OF IRON: CPT | Mod: 90 | Performed by: FAMILY MEDICINE

## 2018-08-16 PROCEDURE — 83735 ASSAY OF MAGNESIUM: CPT | Mod: 90 | Performed by: FAMILY MEDICINE

## 2018-08-16 RX ORDER — LORAZEPAM 1 MG/1
0.5-1 TABLET ORAL EVERY 6 HOURS PRN
Qty: 4 TABLET | Refills: 0 | Status: SHIPPED | OUTPATIENT
Start: 2018-08-16 | End: 2019-01-18

## 2018-08-16 RX ORDER — BUPROPION HYDROCHLORIDE 300 MG/1
300 TABLET ORAL EVERY MORNING
Qty: 90 TABLET | Refills: 3 | Status: SHIPPED | OUTPATIENT
Start: 2018-08-16 | End: 2019-01-18

## 2018-08-16 NOTE — MR AVS SNAPSHOT
After Visit Summary   8/16/2018    Nani Ford    MRN: [id number]           Patient Information     Date Of Birth          1965        Visit Information        Provider Department      8/16/2018 10:30 AM Sara Castellano MD [facility]        Today's Diagnoses     Hand cramps    -  1    Major depressive disorder, recurrent episode, moderate (H)           Follow-ups after your visit        Who to contact     If you have questions or need follow up information about today's clinic visit or your schedule please contact [facility] directly at [phone].  Normal or non-critical lab and imaging results will be communicated to you by MyChart, letter or phone within 4 business days after the clinic has received the results. If you do not hear from us within 7 days, please contact the clinic through [employer] or phone. If you have a critical or abnormal lab result, we will notify you by phone as soon as possible.  Submit refill requests through [employer]X or call your pharmacy and they will forward the refill request to us. Please allow 3 business days for your refill to be completed.          Additional Information About Your Visit        MyChart Information     [employer] gives you secure access to your electronic health record. If you see a primary care provider, you can also send messages to your care team and make appointments. If you have questions, please call your primary care clinic.  If you do not have a primary care provider, please call [phone] and they will assist you.        Care EveryWhere ID     This is your Care EveryWhere ID. This could be used by other organizations to access your [place] medical records  [id number]        Your Vitals Were     Pulse Respirations Pulse Oximetry BMI (Body Mass Index)          77 16 99% 35.43 kg/m2         Blood Pressure from Last 3 Encounters:   08/16/18 132/80   07/13/18 116/68   07/09/18 118/82    Weight from  Last 3 Encounters:   08/16/18 86.5 kg (190 lb 9.6 oz)   07/13/18 89.1 kg (196 lb 6.4 oz)   07/09/18 88.4 kg (194 lb 12.8 oz)              We Performed the Following     CBC with Diff/Plt ([facility])     Comp. Metabolic Panel (14) ([facility])     Ferritin  Serum ([facility])     Iron and TIBC ([facility])     Magnesium  Serum ([facility])     Phosphorus  Serum ([facility])     TSH ([facility])          Today's Medication Changes          These changes are accurate as of 8/16/18 11:00 AM.  If you have any questions, ask your nurse or doctor.               Start taking these medicines.        Dose/Directions    LORazepam 1 MG tablet   Commonly known as:  ATIVAN   Used for:  Hand cramps   Started by:  Sara Castellano MD        Dose:  0.5-1 mg   Take 0.5-1 tablets (0.5-1 mg) by mouth every 6 hours as needed (hand seizure) Take 30 minutes prior to departure.  Do not operate a vehicle after taking this medication   Quantity:  4 tablet   Refills:  0         These medicines have changed or have updated prescriptions.        Dose/Directions    buPROPion 300 MG 24 hr tablet   Commonly known as:  WELLBUTRIN XL   This may have changed:    - medication strength  - how much to take   Used for:  Major depressive disorder, recurrent episode, moderate (H)   Changed by:  Sara Castellano MD        Dose:  300 mg   Take 1 tablet (300 mg) by mouth every morning   Quantity:  90 tablet   Refills:  3            Where to get your medicines      These medications were sent to [facility] [address] [place] [address] [address] [address]    Hours:  24-hours Phone:  [phone]     buPROPion 300 MG 24 hr tablet         Some of these will need a paper prescription and others can be bought over the counter.  Ask your nurse if you have questions.     Bring a paper prescription for each of these medications     LORazepam 1 MG tablet                Primary Care Provider Office Phone # Fax #     Sara Castellano [phone]       [address]  [facility] [address]        Equal Access to Services     JUAN PATEL : Hadammon allen lui salmao Soclarence, waaxda luqadaha, qaybta kaalmada derekda, kiley reyes lasherrilldwight esposito. So [facility] [phone].    ATENCIÓN: Si habla español, tiene a valencia disposición servicios gratuitos de asistencia lingüística. Llame al [phone].    We comply with applicable federal civil rights laws and [place] laws. We do not discriminate on the basis of race, color, national origin, age, disability, sex, sexual orientation, or gender identity.            Thank you!     Thank you for choosing [facility]  for your care. Our goal is always to provide you with excellent care. Hearing back from our patients is one way we can continue to improve our services. Please take a few minutes to complete the written survey that you may receive in the mail after your visit with us. Thank you!             Your Updated Medication List - Protect others around you: Learn how to safely use, store and throw away your medicines at www.disposemymeds.org.          This list is accurate as of 8/16/18 11:00 AM.  Always use your most recent med list.                   Brand Name Dispense Instructions for use Diagnosis    allopurinol 100 MG tablet    ZYLOPRIM    30 tablet    Take 1 tablet by mouth daily.    Gout, chronic       amLODIPine 5 MG tablet    NORVASC    90 tablet    Take 1 tablet by mouth daily    Encounter for medication refill       aspirin 81 MG tablet      Take 81 mg by mouth daily.        buPROPion 300 MG 24 hr tablet    WELLBUTRIN XL    90 tablet    Take 1 tablet (300 mg) by mouth every morning    Major depressive disorder, recurrent episode, moderate (H)       butalbital-acetaminophen-caffeine -40 MG per tablet    FIORICET/ESGIC    30 tablet    Take 1 tablet by mouth every 4 hours as needed    Nonintractable headache, unspecified  chronicity pattern, unspecified headache type       Calcium Carb-Cholecalciferol 600-800 MG-UNIT Tabs      Take 1 tablet by mouth        DULoxetine 60 MG EC capsule    CYMBALTA    90 capsule    Take 1 capsule (60 mg) by mouth daily    Major depressive disorder, recurrent episode, moderate (H)       LORazepam 1 MG tablet    ATIVAN    4 tablet    Take 0.5-1 tablets (0.5-1 mg) by mouth every 6 hours as needed (hand seizure) Take 30 minutes prior to departure.  Do not operate a vehicle after taking this medication    Hand cramps       metoprolol succinate 25 MG 24 hr tablet    TOPROL-XL     Take 25 mg by mouth daily.        mycophenolate 250 MG capsule    GENERIC EQUIVALENT          omeprazole 40 MG capsule    priLOSEC    180 capsule    TAKE 1 CAPSULE(40 MG) BY MOUTH TWICE DAILY    Gastroesophageal reflux disease without esophagitis       predniSONE 5 MG tablet    DELTASONE     Take 1 tablet (5 mg) by mouth daily    Scleroderma (H)       tacrolimus 0.5 MG capsule    GENERIC EQUIVALENT     Managed by pt's transplant team at [place]        zolpidem 10 MG tablet    AMBIEN    30 tablet    TAKE 1 TABLET BY MOUTH EVERY DAY AT BEDTIME    Insomnia due to medical condition

## 2018-08-16 NOTE — PROGRESS NOTES
Problem(s) Oriented visit      SUBJECTIVE:                                                    Nani Ford is a 52 year old female who presents to clinic today for:  Patient presents with:  RECHECK    Here to follow-up depression and anxiety. She is here w/ her grand niece who is in elementary school.   Feeling a little better, but still having some dark days. Is hoping for med change today.   Currently taking Wellbutrin 150 mg qam. Already on Cymbalta 60 mg daily - rx'd by her prior PCP Dr. Torrez.    MIMI-7 SCORE 12/13/2017 3/28/2018 7/9/2018   Total Score 19 14 21     PHQ-9 SCORE 12/13/2017 3/28/2018 7/9/2018   Total Score 20 15 21     Had Tacrolimus level at [place] - those records are not yet available to me.   She accesses her [place] My Chart acct. Level was 5.8 at [place]. 5-6 is nml.    Has a new problem. Having severe hand cramps for several weeks. Symptoms come and go. Not able to bring them on by any action. No new foods. Only new med is Wellbutrin. No known sz d/o. When hand cramps are present, she can barely more her fingers. Shows me hematomas on her hands from severe cramps. Last episode was a few days ago. Will have some lesser amt of cramping in her feet.   On one day, fingers swollen. No upstream difficulty. No new neck or arm pain. Took Tylenol - not helpful.   She volunteers any time she has any symptoms anywhere in her body, she is fearful it may be a sign of rejecting her kidney or liver transplanted organs.   Urination is unchanged, No LE edema, no itching, no jaundice. Recent CMP OK.     Mag 1.6 in 2012. Not checked recently. No etoh use. On multiple meds.     States she thinks she has had this before. She thought she had gout. Turns out to not be gout. He does not recall how this was determined.     Problem list, Medication list, Allergies, and Medical/Social/Surgical histories reviewed in [facility] and updated as appropriate.     ROS:  10 point ROS completed and negative except noted above,  including Gen, HEENT, CV, Resp, GI, , MS, Neurologic, Psych    Histories:   Patient Active Problem List   Diagnosis     History of liver transplant (H)     HTN (hypertension)     History of kidney transplant     Hyperlipidemia with target LDL less than 100     History of gout     Anemia     Scleroderma, diffuse (H)     Raynaud phenomenon     Health Care Home     Fibromyalgia     Major depressive disorder, recurrent episode, moderate (H)     Insomnia, unspecified type     Cervical cancer (H)     Complication of transplanted kidney     Cannabis dependence (H)     Major depressive disorder, single episode, moderate (H)     Liver cell carcinoma (H)     Past Medical History:   Diagnosis Date     Chronic kidney disease, stage IV (severe) (H)     [facility] fu      History of kidney transplant 12/17/2012     HTN (hypertension)      Hyperlipidaemia LDL goal < 100      Liver transplant     [facility] fu ,1-2 x year     Past Surgical History:   Procedure Laterality Date     BIOPSY       GI SURGERY       RETURN LIVER TRANSPLANT      21 years ago     TRANSPLANT       Social History   Substance Use Topics     Smoking status: Former Smoker     Packs/day: 0.30     Years: 30.00     Types: Cigarettes     Quit date: 10/30/2012     Smokeless tobacco: Never Used     Alcohol use No     Family History   Problem Relation Age of Onset     Substance Abuse Sister        OBJECTIVE:                                                    /80  Pulse 77  Resp 16  Wt 86.5 kg (190 lb 9.6 oz)  SpO2 99%  BMI 35.43 kg/m2  Body mass index is 35.43 kg/(m^2).   Gen: Cooperative. No acute distress. Obese body habitus noted. Wt is down 6 lbs since last visit about a month ago when it reached a new high level.   Eyes: PERRL, sclera white.   Ears/Nose/Mouth/Throat: Normal pinnae and ear canals, TMs without erythema or effusion. Oropharynx mucous membranes moist, without lesions, erythema, or exudate.   Neck: Supple, without masses, lymphadenopathy  or tenderness.   Heart: Regular rate and rhythm without murmurs, rubs, or gallops.   Lungs: Normal respiratory effort.   Musculoskeletal: Muscles are diffusely tight - chuyita neck and upper back.   Skin: Warm and well perfused.   Neurologic: Alert, nonfocal.   Psych:  Mood and affect are anxious.     ASSESSMENT/PLAN:                                                      Nani was seen today for recheck.    Diagnoses and all orders for this visit:    Hand cramps  -     CBC with Diff/Plt ([facility])  -     Comp. Metabolic Panel (14) ([facility])  -     Magnesium  Serum ([facility])  -     Ferritin  Serum ([facility])  -     Iron and TIBC ([facility])  -     TSH ([facility])  -     LORazepam (ATIVAN) 1 MG tablet; Take 0.5-1 tablets (0.5-1 mg) by mouth every 6 hours as needed (hand seizure) Take 30 minutes prior to departure.  Do not operate a vehicle after taking this medication  -     Phosphorus  Serum ([facility])   Etiology is not clear.    Question if could be an unusual side effect from Wellbutrin.    She would like to continue w/ Wellbutrin and search for other possible causes.     Major depressive disorder, recurrent episode, moderate (H)  -     buPROPion (WELLBUTRIN XL) 300 MG 24 hr tablet; Take 1 tablet (300 mg) by mouth every morning   Increased dose today. Leave Cymbalta dose stable.     History of liver transplant (H)    History of kidney transplant    Hypertension, unspecified type   Well controlled.     >25 min spent with patient, greater than 50% spent on discussion/education/planning, etc. About The primary encounter diagnosis was Hand cramps. Diagnoses of Major depressive disorder, recurrent episode, moderate (H), History of liver transplant (H), History of kidney transplant, and Hypertension, unspecified type were also pertinent to this visit.    There are no Patient Instructions on file for this visit.    The following health maintenance items are reviewed in Epic and correct as of today:  Health Maintenance   Topic Date Due      HIV SCREEN (SYSTEM ASSIGNED)  09/25/1985     INFLUENZA VACCINE (1) 09/01/2018     PHQ-9 Q6 MONTHS  01/09/2019     COLON CANCER SCREEN (SYSTEM ASSIGNED)  01/12/2019     DEPRESSION ACTION PLAN Q1 YR  03/28/2019     MIMI QUESTIONNAIRE 1 YEAR  07/09/2019     MAMMO SCREEN Q2 YR (SYSTEM ASSIGNED)  11/15/2019     TETANUS IMMUNIZATION (SYSTEM ASSIGNED)  11/15/2021     LIPID SCREEN Q5 YR FEMALE (SYSTEM ASSIGNED)  11/14/2022       Sara Castellano MD  Family Medicine    For any issues, please call my office  [facility] at [phone].

## 2018-08-17 LAB
ALBUMIN SERPL-MCNC: 4.5 G/DL (ref 3.5–5.5)
ALBUMIN/GLOB SERPL: 1.6 {RATIO} (ref 1.2–2.2)
ALP SERPL-CCNC: 47 IU/L (ref 39–117)
ALT SERPL-CCNC: 7 IU/L (ref 0–32)
AST SERPL-CCNC: 12 IU/L (ref 0–40)
BILIRUB SERPL-MCNC: 0.3 MG/DL (ref 0–1.2)
BUN SERPL-MCNC: 29 MG/DL (ref 6–24)
BUN/CREATININE RATIO: 24 (ref 9–23)
CALCIUM SERPL-MCNC: 9.7 MG/DL (ref 8.7–10.2)
CHLORIDE SERPLBLD-SCNC: 100 MMOL/L (ref 96–106)
CREAT SERPL-MCNC: 1.19 MG/DL (ref 0.57–1)
EGFR IF AFRICN AM: 62 ML/MIN/1.73
EGFR IF NONAFRICN AM: 53 ML/MIN/1.73
FERRITIN SERPL-MCNC: 35 NG/ML (ref 15–150)
GLOBULIN, TOTAL: 2.8 G/DL (ref 1.5–4.5)
GLUCOSE SERPL-MCNC: 100 MG/DL (ref 65–99)
IRON BINDING CAP: 295 UG/DL (ref 250–450)
IRON SATURATION: 20 % (ref 15–55)
IRON: 58 UG/DL (ref 27–159)
MAGNESIUM SERPL-MCNC: 1.6 MG/DL (ref 1.6–2.3)
PHOSPHATE SERPL-MCNC: 3.5 MG/DL (ref 2.5–4.5)
POTASSIUM SERPL-SCNC: 4.6 MMOL/L (ref 3.5–5.2)
PROT SERPL-MCNC: 7.3 G/DL (ref 6–8.5)
SODIUM SERPL-SCNC: 140 MMOL/L (ref 134–144)
TOTAL CO2: 25 MMOL/L (ref 20–29)
TSH BLD-ACNC: 1.41 UIU/ML (ref 0.45–4.5)
UIBC: 237 UG/DL (ref 131–425)

## 2018-08-27 DIAGNOSIS — G47.01 INSOMNIA DUE TO MEDICAL CONDITION: ICD-10-CM

## 2018-08-27 RX ORDER — ZOLPIDEM TARTRATE 10 MG/1
TABLET ORAL
Qty: 30 TABLET | Refills: 0 | Status: SHIPPED | OUTPATIENT
Start: 2018-08-27 | End: 2018-09-24

## 2018-08-29 PROBLEM — F12.20 CANNABIS DEPENDENCE (H): Status: ACTIVE | Noted: 2017-11-18

## 2018-09-24 DIAGNOSIS — K21.9 GASTROESOPHAGEAL REFLUX DISEASE WITHOUT ESOPHAGITIS: ICD-10-CM

## 2018-09-24 DIAGNOSIS — G47.01 INSOMNIA DUE TO MEDICAL CONDITION: ICD-10-CM

## 2018-09-24 RX ORDER — ZOLPIDEM TARTRATE 10 MG/1
TABLET ORAL
Qty: 30 TABLET | Refills: 3 | Status: SHIPPED | OUTPATIENT
Start: 2018-09-24 | End: 2019-01-22

## 2018-09-25 NOTE — TELEPHONE ENCOUNTER
omeprazole (PRILOSEC) 40 MG    LAST  Med check 11/11/16 for rx   last labs(for RX) 8/16/18  Next  appt scheduled =  none  Ashley Berry MA

## 2018-09-26 RX ORDER — TACROLIMUS 1 MG/1
CAPSULE ORAL
COMMUNITY
Start: 2018-08-27 | End: 2024-06-04

## 2018-09-26 RX ORDER — OMEPRAZOLE 40 MG/1
CAPSULE, DELAYED RELEASE ORAL
Qty: 180 CAPSULE | Refills: 0 | Status: SHIPPED | OUTPATIENT
Start: 2018-09-26 | End: 2019-01-02

## 2018-10-15 DIAGNOSIS — E78.5 HYPERLIPIDEMIA WITH TARGET LDL LESS THAN 100: ICD-10-CM

## 2018-10-15 PROCEDURE — 36415 COLL VENOUS BLD VENIPUNCTURE: CPT | Performed by: FAMILY MEDICINE

## 2018-10-15 PROCEDURE — 80061 LIPID PANEL: CPT | Mod: 90 | Performed by: FAMILY MEDICINE

## 2018-10-15 NOTE — LETTER
"[facility]  [address] [place]  [address]  Phone: [phone]    October 17, 2018      Nani Ford  [address]  [facility] [address]              Dear Nani,    The results from your recent visit showed Your cholesterol level is improved overall from when it was last checked 2 years ago. The most significant changes is that your LDL went down from 159 to 137 - this is great!  Please continue to eat right-sized portions of a heart healthy diet and stay as physically active as you can sustain in order to be kind to your heart.   I hope you are feeling well!        Sincerely,     Sara \"Amirah\" MD Gray/Serena Johnson,ARABELLA      Results for orders placed or performed in visit on 10/15/18   Lipid Panel ([facility])   Result Value Ref Range    Cholesterol 219 (H) 100 - 199 mg/dL    Triglycerides 131 0 - 149 mg/dL    HDL Cholesterol 56 >39 mg/dL    VLDL Cholesterol Cheko 26 5 - 40 mg/dL    LDL Cholesterol Calculated 137 (H) 0 - 99 mg/dL    LDL/HDL Ratio 2.4 0.0 - 3.2 ratio    Narrative    Performed at:  01 - [facility] [address], [place], [place]  [id number]  : Zheng Garcia MD, Phone:  [phone]      "

## 2018-10-16 LAB
CHOLEST SERPL-MCNC: 219 MG/DL (ref 100–199)
HDLC SERPL-MCNC: 56 MG/DL
LDL/HDL RATIO: 2.4 RATIO (ref 0–3.2)
LDLC SERPL CALC-MCNC: 137 MG/DL (ref 0–99)
TRIGL SERPL-MCNC: 131 MG/DL (ref 0–149)
VLDLC SERPL CALC-MCNC: 26 MG/DL (ref 5–40)

## 2018-11-06 DIAGNOSIS — Z76.0 ENCOUNTER FOR MEDICATION REFILL: ICD-10-CM

## 2018-11-06 RX ORDER — AMLODIPINE BESYLATE 5 MG/1
TABLET ORAL
Qty: 90 TABLET | Refills: 1 | Status: SHIPPED | OUTPATIENT
Start: 2018-11-06 | End: 2019-05-05

## 2019-01-02 DIAGNOSIS — K21.9 GASTROESOPHAGEAL REFLUX DISEASE WITHOUT ESOPHAGITIS: ICD-10-CM

## 2019-01-02 RX ORDER — OMEPRAZOLE 40 MG/1
CAPSULE, DELAYED RELEASE ORAL
Qty: 60 CAPSULE | Refills: 0 | Status: SHIPPED | OUTPATIENT
Start: 2019-01-02 | End: 2019-01-18

## 2019-01-02 NOTE — TELEPHONE ENCOUNTER
omprazole last seen 8/16/18 (not related).  last refill in 9/2018 -states patient needs appointment.  Nothing scheduled.  I have left a message for the patient to call the office to schedule

## 2019-01-18 ENCOUNTER — OFFICE VISIT (OUTPATIENT)
Dept: FAMILY MEDICINE | Facility: CLINIC | Age: 54
End: 2019-01-18

## 2019-01-18 VITALS
OXYGEN SATURATION: 96 % | RESPIRATION RATE: 16 BRPM | WEIGHT: 190 LBS | SYSTOLIC BLOOD PRESSURE: 130 MMHG | BODY MASS INDEX: 35.32 KG/M2 | DIASTOLIC BLOOD PRESSURE: 76 MMHG | HEART RATE: 70 BPM

## 2019-01-18 DIAGNOSIS — N95.1 MENOPAUSAL SYNDROME (HOT FLASHES): Primary | ICD-10-CM

## 2019-01-18 DIAGNOSIS — F33.1 MAJOR DEPRESSIVE DISORDER, RECURRENT EPISODE, MODERATE (H): ICD-10-CM

## 2019-01-18 DIAGNOSIS — K21.9 GASTROESOPHAGEAL REFLUX DISEASE WITHOUT ESOPHAGITIS: ICD-10-CM

## 2019-01-18 DIAGNOSIS — R51.9 NONINTRACTABLE HEADACHE, UNSPECIFIED CHRONICITY PATTERN, UNSPECIFIED HEADACHE TYPE: ICD-10-CM

## 2019-01-18 PROCEDURE — 36415 COLL VENOUS BLD VENIPUNCTURE: CPT | Performed by: FAMILY MEDICINE

## 2019-01-18 PROCEDURE — 99214 OFFICE O/P EST MOD 30 MIN: CPT | Performed by: FAMILY MEDICINE

## 2019-01-18 PROCEDURE — 83001 ASSAY OF GONADOTROPIN (FSH): CPT | Mod: 90 | Performed by: FAMILY MEDICINE

## 2019-01-18 RX ORDER — DULOXETIN HYDROCHLORIDE 30 MG/1
90 CAPSULE, DELAYED RELEASE ORAL DAILY
Qty: 270 CAPSULE | Refills: 3 | Status: SHIPPED | OUTPATIENT
Start: 2019-01-18 | End: 2019-01-18

## 2019-01-18 RX ORDER — BUTALBITAL, ACETAMINOPHEN AND CAFFEINE 50; 325; 40 MG/1; MG/1; MG/1
1 TABLET ORAL EVERY 4 HOURS PRN
Qty: 30 TABLET | Refills: 0 | Status: SHIPPED | OUTPATIENT
Start: 2019-01-18 | End: 2019-01-18

## 2019-01-18 RX ORDER — OMEPRAZOLE 40 MG/1
CAPSULE, DELAYED RELEASE ORAL
Qty: 180 CAPSULE | Refills: 3 | Status: SHIPPED | OUTPATIENT
Start: 2019-01-18 | End: 2020-01-05

## 2019-01-18 RX ORDER — DULOXETIN HYDROCHLORIDE 60 MG/1
CAPSULE, DELAYED RELEASE ORAL
Qty: 90 CAPSULE | Refills: 3 | Status: SHIPPED | OUTPATIENT
Start: 2019-01-18 | End: 2019-02-13

## 2019-01-18 RX ORDER — CYCLOBENZAPRINE HCL 5 MG
5 TABLET ORAL 3 TIMES DAILY PRN
Qty: 90 TABLET | Refills: 1 | Status: SHIPPED | OUTPATIENT
Start: 2019-01-18 | End: 2019-06-05

## 2019-01-18 RX ORDER — BUTALBITAL, ACETAMINOPHEN AND CAFFEINE 50; 325; 40 MG/1; MG/1; MG/1
1 TABLET ORAL EVERY 4 HOURS PRN
Qty: 30 TABLET | Refills: 0 | Status: SHIPPED | OUTPATIENT
Start: 2019-01-18 | End: 2019-05-23

## 2019-01-18 RX ORDER — DULOXETIN HYDROCHLORIDE 30 MG/1
CAPSULE, DELAYED RELEASE ORAL
Qty: 90 CAPSULE | Refills: 3 | Status: SHIPPED | OUTPATIENT
Start: 2019-01-18 | End: 2019-06-05

## 2019-01-18 NOTE — PROGRESS NOTES
Patient is here today with numerous issues.   She is s/p liver/kidney transplant ([place]) and is followed closely there.   She complains of ongoing hot flashes and sweats   Apparently she was tried on HRT without much benefit.   Mom with hot flashes even later in life.   Needs refill on her ppi   Works well.   Needs something for headaches and back pain.  Fioricet works.    Feels depressed and wonders if she needs a higher dose of cymbalta.     Concerned that she's gained weight although on our scale she's been stable since august    ROS otherwise negative including sleep, neuro, CV, skin or GI     /76   Pulse 70   Resp 16   Wt 86.2 kg (190 lb)   SpO2 96%   BMI 35.32 kg/m     GENERAL: healthy, alert and no distress  EYES: Eyes grossly normal to inspection, PERRL and conjunctivae and sclerae normal  HENT: ear canals and TM's normal, nose and mouth without ulcers or lesions  NECK: no adenopathy, no asymmetry, masses, or scars and thyroid normal to palpation  RESP: lungs clear to auscultation - no rales, rhonchi or wheezes  CV: regular rate and rhythm, normal S1 S2, no S3 or S4, no murmur, click or rub, no peripheral edema and peripheral pulses strong  MS: no gross musculoskeletal defects noted, no edema  SKIN: no suspicious lesions or rashes  NEURO: Normal strength and tone, mentation intact and speech normal  PSYCH: mentation appears normal, affect normal/bright    ASSESSMENT:  1. Gastroesophageal reflux disease without esophagitis  Refilled   - omeprazole (PRILOSEC) 40 MG DR capsule; TAKE 1 CAPSULE(40 MG) BY MOUTH TWICE DAILY  Dispense: 180 capsule; Refill: 3  - NUTRITION REFERRAL    2. Menopausal syndrome (hot flashes)  Check for pituitary fx  - FSH  Serum ([facility])    3. Major depressive disorder, recurrent episode, moderate (H)  Increase dose   Pt instructed to come back to the clinic for worsening sx    - DULoxetine (CYMBALTA) 30 MG capsule; Take 3 capsules (90 mg) by mouth daily  Dispense: 270 capsule;  Refill: 3    4. Nonintractable headache, unspecified chronicity pattern, unspecified headache type  Trial of flexeril  - butalbital-acetaminophen-caffeine (FIORICET/ESGIC) -40 MG tablet; Take 1 tablet by mouth every 4 hours as needed  Dispense: 30 tablet; Refill: 0  - cyclobenzaprine (FLEXERIL) 5 MG tablet; Take 1 tablet (5 mg) by mouth 3 times daily as needed for muscle spasms  Dispense: 90 tablet; Refill: 1

## 2019-01-18 NOTE — PROGRESS NOTES
Prescription for 60mg Duloxetine and 30mg Duloxetine sent to pharmacy. Patient insurance denied 3-30mg caps due to quantity limit.  Previous prescription for 3-30mg cap canceled at pharmacy. Ericka Brown

## 2019-01-19 LAB — FSH: 70.2 MIU/ML

## 2019-01-21 DIAGNOSIS — G47.01 INSOMNIA DUE TO MEDICAL CONDITION: ICD-10-CM

## 2019-01-22 RX ORDER — ZOLPIDEM TARTRATE 10 MG/1
TABLET ORAL
Qty: 30 TABLET | Refills: 0 | Status: SHIPPED | OUTPATIENT
Start: 2019-01-22 | End: 2019-02-13

## 2019-01-22 NOTE — TELEPHONE ENCOUNTER
No contract or toxassure.  Nothing listed on monitoring. Last visit 01/18/2019 Dr Morley,  Next appointment 02/01.

## 2019-02-13 ENCOUNTER — OFFICE VISIT (OUTPATIENT)
Dept: FAMILY MEDICINE | Facility: CLINIC | Age: 54
End: 2019-02-13

## 2019-02-13 VITALS
WEIGHT: 189.2 LBS | HEART RATE: 78 BPM | RESPIRATION RATE: 16 BRPM | BODY MASS INDEX: 35.17 KG/M2 | OXYGEN SATURATION: 97 % | DIASTOLIC BLOOD PRESSURE: 86 MMHG | SYSTOLIC BLOOD PRESSURE: 126 MMHG

## 2019-02-13 DIAGNOSIS — Z23 NEED FOR PROPHYLACTIC VACCINATION AND INOCULATION AGAINST INFLUENZA: ICD-10-CM

## 2019-02-13 DIAGNOSIS — F33.1 MAJOR DEPRESSIVE DISORDER, RECURRENT EPISODE, MODERATE (H): ICD-10-CM

## 2019-02-13 DIAGNOSIS — E66.01 MORBID OBESITY (H): ICD-10-CM

## 2019-02-13 DIAGNOSIS — G47.01 INSOMNIA DUE TO MEDICAL CONDITION: ICD-10-CM

## 2019-02-13 DIAGNOSIS — D50.9 IRON DEFICIENCY ANEMIA, UNSPECIFIED IRON DEFICIENCY ANEMIA TYPE: Primary | ICD-10-CM

## 2019-02-13 LAB
% GRANULOCYTES: 80.7 % (ref 42.2–75.2)
HCT VFR BLD AUTO: 41 % (ref 35–46)
HEMOGLOBIN: 13.7 G/DL (ref 11.8–15.5)
LYMPHOCYTES NFR BLD AUTO: 14.9 % (ref 20.5–51.1)
MCH RBC QN AUTO: 27.6 PG (ref 27–31)
MCHC RBC AUTO-ENTMCNC: 33.5 G/DL (ref 33–37)
MCV RBC AUTO: 82.4 FL (ref 80–100)
MONOCYTES NFR BLD AUTO: 4.4 % (ref 1.7–9.3)
PLATELET # BLD AUTO: 261 K/UL (ref 140–450)
RBC # BLD AUTO: 4.98 X10/CMM (ref 3.7–5.2)
WBC # BLD AUTO: 9.4 X10/CMM (ref 3.8–11)

## 2019-02-13 PROCEDURE — 90686 IIV4 VACC NO PRSV 0.5 ML IM: CPT | Performed by: FAMILY MEDICINE

## 2019-02-13 PROCEDURE — 83550 IRON BINDING TEST: CPT | Mod: 90 | Performed by: FAMILY MEDICINE

## 2019-02-13 PROCEDURE — 82728 ASSAY OF FERRITIN: CPT | Mod: 90 | Performed by: FAMILY MEDICINE

## 2019-02-13 PROCEDURE — 83540 ASSAY OF IRON: CPT | Mod: 90 | Performed by: FAMILY MEDICINE

## 2019-02-13 PROCEDURE — 85025 COMPLETE CBC W/AUTO DIFF WBC: CPT | Performed by: FAMILY MEDICINE

## 2019-02-13 PROCEDURE — 99214 OFFICE O/P EST MOD 30 MIN: CPT | Performed by: FAMILY MEDICINE

## 2019-02-13 PROCEDURE — 36415 COLL VENOUS BLD VENIPUNCTURE: CPT | Performed by: FAMILY MEDICINE

## 2019-02-13 PROCEDURE — 90471 IMMUNIZATION ADMIN: CPT | Performed by: FAMILY MEDICINE

## 2019-02-13 RX ORDER — ZOLPIDEM TARTRATE 10 MG/1
TABLET ORAL
Qty: 30 TABLET | Refills: 0 | Status: SHIPPED | OUTPATIENT
Start: 2019-02-13 | End: 2019-03-20

## 2019-02-13 RX ORDER — DULOXETIN HYDROCHLORIDE 60 MG/1
CAPSULE, DELAYED RELEASE ORAL
Qty: 180 CAPSULE | Refills: 3 | Status: SHIPPED | OUTPATIENT
Start: 2019-02-13 | End: 2019-10-16

## 2019-02-13 ASSESSMENT — ANXIETY QUESTIONNAIRES
2. NOT BEING ABLE TO STOP OR CONTROL WORRYING: NEARLY EVERY DAY
IF YOU CHECKED OFF ANY PROBLEMS ON THIS QUESTIONNAIRE, HOW DIFFICULT HAVE THESE PROBLEMS MADE IT FOR YOU TO DO YOUR WORK, TAKE CARE OF THINGS AT HOME, OR GET ALONG WITH OTHER PEOPLE: VERY DIFFICULT
6. BECOMING EASILY ANNOYED OR IRRITABLE: NEARLY EVERY DAY
5. BEING SO RESTLESS THAT IT IS HARD TO SIT STILL: NEARLY EVERY DAY
3. WORRYING TOO MUCH ABOUT DIFFERENT THINGS: NEARLY EVERY DAY
7. FEELING AFRAID AS IF SOMETHING AWFUL MIGHT HAPPEN: NEARLY EVERY DAY
GAD7 TOTAL SCORE: 21
1. FEELING NERVOUS, ANXIOUS, OR ON EDGE: NEARLY EVERY DAY

## 2019-02-13 ASSESSMENT — PATIENT HEALTH QUESTIONNAIRE - PHQ9
5. POOR APPETITE OR OVEREATING: NEARLY EVERY DAY
SUM OF ALL RESPONSES TO PHQ QUESTIONS 1-9: 22

## 2019-02-13 NOTE — PROGRESS NOTES
Anxiety medications    Flu shot UTD? Would like to have today DONE  SUBJECTIVE:   Nani Ford is a 53 year old female who presents to clinic today for the following health issues:  H/o liver/kidney transplant ([place])    Was seen recently by Dr Morley. For HA, GERD, Hot flashes and mood.   Cymbalta was increased to 90 mg daily. He also added flexeril for her HA, as well as refills for other medications that day.    Sleep Off and on  Appetite Poor  Exercise Walks 3-5 X week    Smoking No  ETOH No  Street drugs/[facility]  Caffeine Coffee    Fall/Seizure/LOC None    Depression More anxious than depressed  Anxiety Severe  Panic Severe  SI/SP None  Hallucinations None  Paranoid None  Manic None  OCD None  PTSD None  Gambling None    Crafting helps calm  Read and listening to book hard  No LD  Stress balls no help  Journal tried sometimes  Coloring no hard to concentrate    Up 6 am  Bathroom  Make coffee   off to work  Back to bed in and out with curtains closed   back 6-7 pm  Supper sometimes together  He is in living room and she is in bed  TV in bedroom. Consider taking out of bedroom.  Cell phone rare  SAD ? Daily 30 min.  Vit D not taking    Supports his parents  No kids  Pets 3 dogs and 2 cats. Calming. In the bed with her.  People coming over feeling more anxious  Sometimes also with   Isolating     Prior to transplant-  Mgr Smyth 7 years  Travel [facility] [place]  Best friend- still talking before was daily, now 1-2 twice per month. Patient does not  initiate that.   does all the shopping now.    Drove here herself. First time in 2 weeks.    Migraine 2 days after last visit. Medication was working.    PHQ=22  MIMI=21    Increased Cymbalta to 120 mg daily.     Counseling no, tried that. Tried 2 different ones and one at [place].  CALM CAIT  DBT no      Creatine Clearance = previously 76 mL/min   (avoid cymbalta if CrCl<30 mL/min or hepatic insufficiency/cirrhosis or substantial  "alcohol use)    Estimated body mass index is 35.32 kg/m  as calculated from the following:    Height as of 7/9/18: 1.562 m (5' 1.5\").    Weight as of 1/18/19: 86.2 kg (190 lb).  Weight loss is recommended.    Creatinine   Date Value Ref Range Status   08/16/2018 1.19 (H) 0.57 - 1.00 mg/dL Final     GFR Estimate   Date Value Ref Range Status   02/16/2015 30 (L) >60 mL/min/1.7m2 Final   11/15/2012 7 (L) >60 mL/min/1.7m2 Final   11/01/2012 12 (L) >60 mL/min/1.7m2 Final     Lab Results   Component Value Date    AST 12 08/16/2018     Lab Results   Component Value Date    ALT 7 08/16/2018     Lab Results   Component Value Date    BILICONJ 2.4 10/02/2012      Lab Results   Component Value Date    BILITOTAL 0.3 08/16/2018     Lab Results   Component Value Date    ALBUMIN 4.5 08/16/2018     Lab Results   Component Value Date    PROTTOTAL 7.3 08/16/2018      Lab Results   Component Value Date    ALKPHOS 47 08/16/2018         BP Readings from Last 3 Encounters:   02/13/19 126/86   01/18/19 130/76   08/16/18 132/80     H/o cervical cancer and liver cancer      Has not eaten today  Toast with butter  Fruit cut water rachel, honey dew, musk melon  Veggies corn, peas, green beans, sprout, lima beans  Meat chicken, ground turkey     cooks  Laundry both  Dusting patient  Vacuum 5-7 min self  Cleaning bathroom self      Problem list and histories reviewed & adjusted, as indicated.  Additional history: as documented    Patient Active Problem List   Diagnosis     History of liver transplant (H)     HTN (hypertension)     History of kidney transplant     Hyperlipidemia with target LDL less than 100     History of gout     Anemia     Scleroderma, diffuse (H)     Raynaud phenomenon     Health Care Home     Fibromyalgia     Major depressive disorder, recurrent episode, moderate (H)     Insomnia, unspecified type     Cervical cancer (H)     Complication of transplanted kidney     Cannabis dependence (H)     Major depressive disorder, " single episode, moderate (H)     Liver cell carcinoma (H)     Past Surgical History:   Procedure Laterality Date     BIOPSY       GI SURGERY       RETURN LIVER TRANSPLANT      21 years ago     TRANSPLANT         Social History     Tobacco Use     Smoking status: Former Smoker     Packs/day: 0.30     Years: 30.00     Pack years: 9.00     Types: Cigarettes     Last attempt to quit: 10/30/2012     Years since quittin.2     Smokeless tobacco: Never Used   Substance Use Topics     Alcohol use: No     Alcohol/week: 0.0 oz     Family History   Problem Relation Age of Onset     Substance Abuse Sister          Current Outpatient Medications   Medication Sig Dispense Refill     amLODIPine (NORVASC) 5 MG tablet TAKE 1 TABLET DAILY 90 tablet 1     aspirin 81 MG tablet Take 81 mg by mouth daily.       butalbital-acetaminophen-caffeine (FIORICET/ESGIC) -40 MG tablet Take 1 tablet by mouth every 4 hours as needed 30 tablet 0     Calcium Carb-Cholecalciferol 600-800 MG-UNIT TABS Take 1 tablet by mouth       DULoxetine (CYMBALTA) 30 MG capsule Take 1-30mg cap with 1-60mg cap for total dose 90mg QD 90 capsule 3     DULoxetine (CYMBALTA) 60 MG capsule Take 1-60mg cap with 1-30mg cap for totally daily dose 90mg 90 capsule 3     metoprolol (TOPROL-XL) 25 MG 24 hr tablet Take 25 mg by mouth daily.       mycophenolate (CELLCEPT - GENERIC EQUIVALENT) 250 MG capsule   3     omeprazole (PRILOSEC) 40 MG DR capsule TAKE 1 CAPSULE(40 MG) BY MOUTH TWICE DAILY 180 capsule 3     predniSONE (DELTASONE) 5 MG tablet Take 1 tablet (5 mg) by mouth daily       tacrolimus (GENERIC EQUIVALENT) 0.5 MG capsule Managed by pt's transplant team at [facility]  0     tacrolimus (GENERIC EQUIVALENT) 1 MG capsule Rx managed by transplant team.       zolpidem (AMBIEN) 10 MG tablet TAKE 1 TABLET BY MOUTH EVERY DAY AT BEDTIME 30 tablet 0     Allergies   Allergen Reactions     Acetaminophen-Codeine      Other reaction(s): GI intolerance     Chlorhexidine Other  (See Comments)     burning     Codeine Other (See Comments)     Codeine Sulfate Nausea and Vomiting     Diatrizoate Other (See Comments)     Patient has a transplanted kidney so dye should be with caution if at all.  Patient has a transplanted kidney so dye should be with caution if at all.     Hydrocodone-Acetaminophen Other (See Comments)     doesn't work     Hydromorphone Nausea and Vomiting     Recent Labs   Lab Test 10/15/18  0900 08/16/18  1205 07/10/18  0918 03/28/18  0946  10/07/16  1016  10/29/15  1702  02/16/15  1619  11/15/12  1135  02/22/12  1211   A1C  --   --   --   --   --   --   --   --   --   --   --   --   --  4.5   *  --   --   --   --  159*  --  87  --   --    < >  --   --   --    HDL 56  --   --   --   --  61  --  47  --   --    < >  --   --   --    TRIG 131  --   --   --   --  108  --  134  --   --    < >  --   --   --    ALT  --  7 13 9   < >  --    < > 18   < >  --    < > 41   < > 73*   CR  --  1.19* 1.34* 1.24*   < >  --    < > 1.49*   < >  --    < > 6.64*   < > 2.55*   GFRESTIMATED  --   --   --   --   --   --   --   --   --  30*  --  7*   < >  --    GFRESTBLACK  --   --   --   --   --   --   --   --   --  36*  --  8*   < >  --    POTASSIUM  --  4.6 4.6 4.2   < >  --    < > 4.6   < >  --    < > 4.3   < > 4.8    < > = values in this interval not displayed.      BP Readings from Last 3 Encounters:   02/13/19 126/86   01/18/19 130/76   08/16/18 132/80    Wt Readings from Last 3 Encounters:   02/13/19 85.8 kg (189 lb 3.2 oz)   01/18/19 86.2 kg (190 lb)   08/16/18 86.5 kg (190 lb 9.6 oz)                  Labs reviewed in EPIC    Reviewed and updated as needed this visit by clinical staff       Reviewed and updated as needed this visit by Provider         ROS:  Constitutional, HEENT, cardiovascular, pulmonary, GI, , musculoskeletal, neuro, skin, endocrine and psych systems are negative, except as otherwise noted.    OBJECTIVE:     /86   Pulse 78   Resp 16   Wt 85.8 kg (189 lb  3.2 oz)   SpO2 97%   BMI 35.17 kg/m    There is no height or weight on file to calculate BMI.  GENERAL: healthy, alert and no distress TRANSPLANT PATIENT  EYES: Eyes grossly normal to inspection, PERRL and conjunctivae and sclerae normal  HENT: ear canals and TM's normal, nose and mouth without ulcers or lesions  NECK: no adenopathy, no asymmetry, masses, or scars and thyroid normal to palpation  RESP: lungs clear to auscultation - no rales, rhonchi or wheezes  CV: regular rate and rhythm, normal S1 S2, no S3 or S4, no murmur, click or rub, no peripheral edema and peripheral pulses strong  ABDOMEN: soft, nontender, no hepatosplenomegaly, no masses and bowel sounds normal  MS: no gross musculoskeletal defects noted, no edema  SKIN: no suspicious lesions or rashes Old surgery scars  NEURO: Normal strength and tone, mentation intact and speech normal  PSYCH: mentation appears normal, affect anxious  LYMPH: no cervical, supraclavicular, axillary, or inguinal adenopathy    Diagnostic Test Results:  Results for orders placed or performed in visit on 01/18/19   FSH  Serum ([facility])   Result Value Ref Range    FSH 70.2 mIU/mL    Narrative    Performed at:  01 - [facility] [address], [place], [place]  [id number]  : Zheng Garcia MD, Phone:  [phone]       ASSESSMENT/PLAN:     ASSESSMENT / PLAN:  (D50.9) Iron deficiency anemia, unspecified iron deficiency anemia type  (primary encounter diagnosis)  Comment:   Hemoglobin   Date Value Ref Range Status   02/13/2019 13.7 11.8 - 15.5 g/dl Final   08/16/2018 13.9 11.8 - 15.5 g/dl Final       Plan: CBC with Diff/Plt ([facility]), Ferritin  Serum         ([facility]), Iron and TIBC ([facility])            (F33.1) Major depressive disorder, recurrent episode, moderate (H)  Comment: increased dose to 120 mg daily  Plan: DULoxetine (CYMBALTA) 60 MG capsule        Set goals to reduce isolation.    (E66.01) Morbid obesity (H)  Comment: diet and exercise as able   Plan: as  above    (G47.01) Insomnia due to medical condition  Comment:   Plan: zolpidem (AMBIEN) 10 MG tablet, CANCELED:         ToxASSURE Urine Drug Screen ([facility])            (Z23) Need for prophylactic vaccination and inoculation against influenza  Comment:   Plan: FLU VACCINE, SPLIT VIRUS, IM (QUADRIVALENT)         [16257]- >3 YRS, Vaccine Administration,         Initial [41774]                Patient Instructions   CALM CAIT    Consider Dialectical behavioral therapy  Group or work book    Lab today    Take the TV out of the bedroom    Goal call best friend in the next two weeks    Cymbalta 120 mg daily    Ambien refill today and contract  Urine test    F/u 2-4 weeks    Social work to call you about in home mental health support options    Consider gene sight testing ( call insurance about coverage)                      Linda Browning MD  [facility]

## 2019-02-13 NOTE — PROGRESS NOTES

## 2019-02-13 NOTE — LETTER
[facility]  02/13/19    Patient: Nani Ford  YOB: 1965  Medical Record Number: [id number]  CSN: [id number]                                                                              Non-opioid Controlled Substance Agreement    I understand that my care provider has prescribed a controlled substance to help manage my condition(s). I am taking this medicine to help me function or work. I know this is strong medicine, and that it can cause serious side effects. Controlled substances can be sedating, addicting and may cause a dependency on the drug. They can affect my ability to drive or think, and cause depression. They need to be taken exactly as prescribed. Combining controlled substances with certain medicines or chemicals (such as cocaine, sedatives and tranquilizers, sleeping pills, meth) can be dangerous or even fatal. Also, if I stop controlled substances suddenly, I may have severe withdrawal symptoms.  If not helpful, I may be asked to stop them.    The risks, benefits, and side effects of these medicine(s) were explained to me. I agree that:    1. I will take part in other treatments as advised by my care team. This may be psychiatry or counseling, physical therapy, behavioral therapy, group treatment or a referral to a pain clinic. I will reduce or stop my medicine when my care team tells me to do so.  2. I will take my medicines as prescribed. I will not change the dose or schedule unless my care team tells me to. There will be no refills if I  run out early.   I may be contactedwithout warning and asked to complete a urine drug test or pill count at any time.   3. I will keep all my appointments, and understand this is part of the monitoring of controlled substances. My care team may require an office visit for EVERY controlled substance refill. If I miss appointments or don t follow instructions, my care team may stop my medicine.  4. I will not ask other providers to  prescribe controlled substances, and I will not accept controlled substances from other people. If I need another prescribed controlled substance for a new reason, I will tell my care team within 1 business day.  5. I will use one pharmacy to fill all of my controlled substance prescriptions, and it is up to me to make sure that I do not run out of my medicines on weekends or holidays. If my care team is willing to refill my controlled substance prescription without a visit, I must request refills only during office hours, refills may take up to 3 days to process, and it may take up to 5 to 7 days for my medicine to be mailed and ready at my pharmacy. Prescriptions will not be mailed anywhere except my pharmacy.    6. I am responsible for my prescriptions. If the medicine/prescription is lost or stolen, it will not be replaced. I also agree not to share controlled substance medicines with anyone.              [facility]  02/13/19  Patient:  Nani Ford  YOB: 1965  Medical Record Number: [id number]  CSN: [id number]    7. I agree to not use ANY illegal or recreational drugs. This includes marijuana, cocaine, bath salts or other drugs. I agree not to use alcohol unless my care team says I may. I agree to give urine samples whenever asked. If I don t give a urine sample, the care team may stop my medicine.    8. If I enroll in the [place] Medical Marijuana program, I will tell my care team. I will also sign an agreement to share my medical records with my care team.    9. I will bring in my list of medicines (or my medicine bottles) each time I come to the clinic.   10. I will tell my care team right away if I become pregnant or have a new medical problem treated outside of my regular clinic.  11. I understand that this medicine can affect my thinking and judgment. It may be unsafe for me to drive, use machinery and do dangerous tasks. I will not do any of these things until I know how  the medicine affects me. If my dose changes, I will wait to see how it affects me. I will contact my care team if I have concerns about medicine side effects.    I understand that if I do not follow any of the conditions above, my prescriptions or treatment may be stopped.      I agree that my provider, clinic care team, and pharmacy may work with any city, state or federal law enforcement agency that investigates the misuse, sale, or other diversion of my controlled medicine. I will allow my provider to discuss my care with or share a copy of this agreement with any other treating provider, pharmacy or emergency room where I receive care. I agree to give up (waive) any right of privacy or confidentiality with respect to these consents.   I have read this agreement and have asked questions about anything I did not understand.    ____________________________________________________    ____________  ________  Patient signature                                                         Date      Time    ____________________________________________________     ____________  ________  Witness                                                          Date      Time    ____________________________________________________  Provider signature

## 2019-02-13 NOTE — PATIENT INSTRUCTIONS
CALM CAIT    Consider Dialectical behavioral therapy  Group or work book    Lab today    Take the TV out of the bedroom    Goal call best friend in the next two weeks    Cymbalta 120 mg daily    Ambien refill today and contract  Urine test    F/u 2-4 weeks    Social work to call you about in home mental health support options    Consider gene sight testing ( call insurance about coverage)

## 2019-02-13 NOTE — Clinical Note
Patient s/p transplantIsolatingCould you call her with options for Mental Health Support.An ILS worker that could come to her home and take her out would probably be helpful.

## 2019-02-14 LAB
FERRITIN SERPL-MCNC: 29 NG/ML (ref 15–150)
IRON BINDING CAP: 312 UG/DL (ref 250–450)
IRON SATURATION: 15 % (ref 15–55)
IRON: 46 UG/DL (ref 27–159)
UIBC: 266 UG/DL (ref 131–425)

## 2019-02-14 ASSESSMENT — ANXIETY QUESTIONNAIRES: GAD7 TOTAL SCORE: 21

## 2019-02-15 NOTE — RESULT ENCOUNTER NOTE
Iron studies were fine  CBC with minor changes to % lymphocytes and granulocytes.   Hemoglobin and platelets are fine  No action needed at this time

## 2019-02-21 ENCOUNTER — PATIENT OUTREACH (OUTPATIENT)
Dept: CARE COORDINATION | Facility: CLINIC | Age: 54
End: 2019-02-21

## 2019-02-21 DIAGNOSIS — Z94.4 STATUS POST LIVER TRANSPLANTATION (H): Primary | ICD-10-CM

## 2019-02-22 NOTE — PROGRESS NOTES
"Clinic Care Coordination Contact  [facility]/Voicemail    Referral Source: PCP  Referral Information: Patient s/p transplant. Isolating. Could you call her with options for Mental Health Support.An ILS worker that could come to her home and take her out would probably be helpful.     Clinical Data: Care Coordinator Outreach  Outreach attempted x 1.  Writer left a message on patient's voicemail with call back information and requested return call.    Plan: If no response, Social Work Care Coordinator will try to reach patient again in 3-5 business days.    Yvonne Cruz [facility] Care Coordination  Clinic: [facility]  Email: [email or web address]  Tele: [phone]      Addendum  2/27/19  Data: Writer was available in the [facility] today to meet with patient after patient's scheduled office visit with Dr. Browning. Patient was a \"no show\" for today's appointment.  Plan: [facility] will try to reach patient via phone within the next few days.    Yvonne Cruz [facility] Care Coordination  Clinic: [facility]   Email: [email or web address]  Tele: [phone]                      "

## 2019-02-22 NOTE — PROGRESS NOTES
Clinic Care Coordination Contact  [facility]     Referral Source: PCP  Referral Information: Patient s/p transplant. Isolating. Could you call her with options for Mental Health Support.An ILS worker that could come to her home and take her out would probably be helpful.      Clinical Data: Care Coordinator Outreach  Outreach attempted x 1.  Writer left a message on patient's voicemail with call back information and requested return call.     Plan: If no response, Social Work Care Coordinator will try to reach patient again in 3-5 business days.     Yvonne Cruz, [facility] Care Coordination  Clinic: [facility]  Email: [email or web address]  Tele: [phone]

## 2019-02-23 NOTE — TELEPHONE ENCOUNTER
amlodipine---first time filling.  last seen 3/28/18   - without end organ damages   - now bp trending up   - can increase the labetolol to 300 mg three times a day   - labs noted   - on magnesium drip   - the goal is bp below 140/90 (most of the readings during the day around 110-120/70 to 80), if the Bp is still trending up can increase the labetolol to 400 mg three times a day and put her on Nifedipine 30 mg daily   - check labs daily - BMP, LDH, CBC< uric acid, AST, ALT, LFTS  - not significant proteinuria

## 2019-03-20 DIAGNOSIS — G47.01 INSOMNIA DUE TO MEDICAL CONDITION: ICD-10-CM

## 2019-03-22 RX ORDER — ZOLPIDEM TARTRATE 10 MG/1
TABLET ORAL
Qty: 30 TABLET | Refills: 0 | Status: SHIPPED | OUTPATIENT
Start: 2019-03-22 | End: 2019-04-18

## 2019-03-22 NOTE — TELEPHONE ENCOUNTER
Last OV 2/13/19, no future appointment scheduled. Contract signed 2/13/19, no toxassure on file. [place]  checked, no [place] 3/2018-present. Ericka Brown

## 2019-04-09 ENCOUNTER — PATIENT OUTREACH (OUTPATIENT)
Dept: CARE COORDINATION | Facility: CLINIC | Age: 54
End: 2019-04-09

## 2019-04-09 NOTE — LETTER
CARE COORDINATION  [facility] [address] [place]  [address]      April 9, 2019    Nani Ford  [address]  [facility] [address]      Dear Nani,    I am a Social Work Clinic Care Coordinator who works with Dr. Linda Browning at [facility]. I have been trying to reach you recently to introduce Clinic Care Coordination and to see if there was anything I could assist you with. Below is a description of clinic care coordination.    The clinic care coordinator is a registered nurse and/or  who understand the health care system. The goal of clinic care coordination is to help you manage your health and improve access to the [facility] in the most efficient manner. The registered nurse can assist you in meeting your health care goals by providing education, coordinating services, and strengthening the communication among your providers. The  can assist you with financial, behavioral, psychosocial, chemical dependency, counseling, and/or psychiatric resources.    Dr. Browning wanted me to reach out to you to see if there might be more support resources available to you. One resource that I usually mention to individuals is [facility] (intake) which has information regarding many [facility] programs. [phone].    Please feel free to contact me at [phone] with any questions. Thank you!    Sincerely,         Yvonne Cruz, ROSANNE  [facility] Care Coordination  Clinic: [facility]  Email: [email or web address]  Tele: [phone]

## 2019-04-09 NOTE — PROGRESS NOTES
Clinic Care Coordination Contact  [facility]/Voicemail    Original Referral Information: Patient s/p transplant. Isolating. Could you call her with options for Mental Health Support. An ILS worker that could come to her home and take her out would probably be helpful.        Clinical Data: Care Coordinator Outreach    Outreach attempted x 2.  Writer left a message on patient's voicemail with call back information and requested return call.    Plan: Writer mailed out care coordination introduction letter with care coordinator contact information and explanation of care coordination services to patient today. In this letter, writer also gave the phone number for [facility] ([phone]).  Writer is available to discuss community resources with patient, if patient calls. Social Work Care Coordinator will plan no further outreaches at this time.      Yvonne Cruz, [facility] Care Coordination  Clinic: [facility]  Email: [email or web address]  Tele: [phone]

## 2019-05-05 DIAGNOSIS — Z76.0 ENCOUNTER FOR MEDICATION REFILL: ICD-10-CM

## 2019-05-06 RX ORDER — AMLODIPINE BESYLATE 5 MG/1
TABLET ORAL
Qty: 90 TABLET | Refills: 1 | Status: SHIPPED | OUTPATIENT
Start: 2019-05-06 | End: 2019-11-02

## 2019-05-06 NOTE — TELEPHONE ENCOUNTER
Creatinine   Date Value Ref Range Status   08/16/2018 1.19 (H) 0.57 - 1.00 mg/dL Final     BP Readings from Last 3 Encounters:   02/13/19 126/86   01/18/19 130/76   08/16/18 132/80

## 2019-05-23 DIAGNOSIS — R51.9 NONINTRACTABLE HEADACHE, UNSPECIFIED CHRONICITY PATTERN, UNSPECIFIED HEADACHE TYPE: ICD-10-CM

## 2019-05-24 RX ORDER — BUTALBITAL, ACETAMINOPHEN AND CAFFEINE 50; 325; 40 MG/1; MG/1; MG/1
1 TABLET ORAL EVERY 4 HOURS PRN
Qty: 30 TABLET | Refills: 0 | Status: SHIPPED | OUTPATIENT
Start: 2019-05-24 | End: 2019-07-16

## 2019-05-24 NOTE — PROGRESS NOTES
F/u anxiety    [place] monitoring reviewed  Contract 2/2019  Toxassure in house test reviewed today      White female glasses    November is Renal/liver Transplant recheck    Mammogram at [facility] due in Nov  Pap at [facility]  Dexa at [facility]  Colonoscopy had one previously at [facility], they will decide when do next  Eye exam UTD  Dental UTD      Caress soap-consider Dove unscented  Claritin 5 mg OTC prn itching  Eucerin, Cetaphil, Amlactin lotions OTC    Sleep Fair but improved  Appetite Good  Exercise Walking, yardwork    Smoking no  ETOH no  Street drugs/MJ  Still using MJ? Yes daily  Caffeine Yes    Fall/seizure/LOC no    On Cymbalta 120 mg daily helping  PHQ=9  MIMI=14  Counseling no  Tried massage and that helps  Crafting  Showering     Ambien helping- no sleep walking or eating    Supports: Family, , parents, sibling  No groups    Typical  Up by 630 am  Make coffee  Bathroom, teeth  Coffee with   He goes to work  Good day outside yard work, planting flowers most of the day 50 %  Housework self  Laundry self  Hard day: grey day mind does not shut off, in and out of bed, SAD light. No SI/ROSS. 50%  Grocery shopping   Lunch skipping  Supper 6-630 pm with  who cooks  Craft or TV with   9 pm medications  Bedtime  pm nocturia rare awakens 3-4 times and gets up for 15-20 min walk around the house and then back to bed  No devices    Pain rating 8/10  Best day 2/10 last one 2-3 weeks ago    Migraines 3/month    Past Medical History:   Diagnosis Date     Chronic kidney disease, stage IV (severe) (H)     [facility] fu      History of kidney transplant 12/17/2012     HTN (hypertension)      Hyperlipidaemia LDL goal < 100      Liver transplant     [facility] fu ,1-2 x year     Past Surgical History:   Procedure Laterality Date     BIOPSY       GI SURGERY       RETURN LIVER TRANSPLANT      21 years ago     TRANSPLANT            ROS: 10 point ROS neg other than the symptoms noted above in the HPI.  BP  122/82   Pulse 68   Resp 16   Wt 84.8 kg (187 lb)   SpO2 95%   BMI 34.76 kg/m    Exam: known liver/kidney transplant patient  Constitutional: healthy, alert and no distress  Head: Normocephalic. No masses, lesions, tenderness or abnormalities  Neck: Neck supple. No adenopathy. Thyroid symmetric, normal size,, Carotids without bruits.  ENT: ENT exam normal, no neck nodes or sinus tenderness  Cardiovascular: negative, PMI normal. No lifts, heaves, or thrills. RRR. No murmurs, clicks gallops or rub  Respiratory: negative, Percussion normal. Good diaphragmatic excursion. Lungs clear  Gastrointestinal: Abdomen soft, non-tender. BS normal. No masses, organomegaly  : Deferred  Musculoskeletal: extremities normal- no gross deformities noted, gait normal and normal muscle tone  Skin: no suspicious lesions or rashes  Neurologic: Gait normal. Reflexes normal and symmetric. Sensation grossly WNL.  Psychiatric: mentation appears normal and affect normal/bright  Hematologic/Lymphatic/Immunologic: Normal cervical lymph nodes    ASSESSMENT / PLAN:  (G47.00) Insomnia, unspecified type  (primary encounter diagnosis)  Comment:   Plan: CANCELED: ToxASSURE Urine Drug Screen ([facility])        ambien    (Z01.84) Immunity status testing  Comment: patient request  Plan: Measles/Mumps/Rubella Immunity ([facility])            (F33.1) Major depressive disorder, recurrent episode, moderate (H)  Comment: stable  Plan: Continue cares    (F12.20) Cannabis dependence (H)  Comment:   Plan: follow , encourage minimizing or quitting    (I10) Essential hypertension  Comment:   BP Readings from Last 3 Encounters:   06/05/19 122/82   02/13/19 126/86   01/18/19 130/76     Creatinine   Date Value Ref Range Status   08/16/2018 1.19 (H) 0.57 - 1.00 mg/dL Final         Plan: Continue cares    (Z94.0) History of kidney transplant  Comment:   Plan: f/u with [facility] as planned    (M79.7) Fibromyalgia  Comment: stable  Plan: Continue cares    (M34.9) Scleroderma,  diffuse (H)  Comment:   Plan: Continue cares    (C22.0) Liver cell carcinoma (H)  Comment: history  Plan: f/u with [facility] as planned    (I73.00) Raynaud's phenomenon without gangrene  Comment:   Plan: Continue cares    (D64.9) Anemia, unspecified type  Comment:   Hemoglobin   Date Value Ref Range Status   02/13/2019 13.7 11.8 - 15.5 g/dl Final   08/16/2018 13.9 11.8 - 15.5 g/dl Final       Plan: continue cares    (Z94.4) History of liver transplant (H)  Comment:   Plan: f/u with [facility] as planned    (E78.5) Hyperlipidemia with target LDL less than 100  Comment:   LDL Cholesterol Calculated   Date Value Ref Range Status   10/15/2018 137 (H) 0 - 99 mg/dL Final       Plan: fasting lab    (Z87.39) History of gout  Comment: no recent flares  Plan: observe    (C53.9) Malignant neoplasm of cervix, unspecified site (H)  Comment: history      Patient instructions  Caress soap- Dove unscented  Claritin 5 mg OTC prn itching (minimal use advised due the liver/kidney history)  Eucerin, Cetaphil, Amlactin lotions OTC    MTM message sent to look at other medications that we can use for anxiety safe for your transplant status    Labs today  Linda EATON

## 2019-06-05 ENCOUNTER — OFFICE VISIT (OUTPATIENT)
Dept: FAMILY MEDICINE | Facility: CLINIC | Age: 54
End: 2019-06-05

## 2019-06-05 VITALS
WEIGHT: 187 LBS | OXYGEN SATURATION: 95 % | DIASTOLIC BLOOD PRESSURE: 82 MMHG | RESPIRATION RATE: 16 BRPM | BODY MASS INDEX: 34.76 KG/M2 | SYSTOLIC BLOOD PRESSURE: 122 MMHG | HEART RATE: 68 BPM

## 2019-06-05 DIAGNOSIS — I73.00 RAYNAUD'S PHENOMENON WITHOUT GANGRENE: ICD-10-CM

## 2019-06-05 DIAGNOSIS — M34.9 SCLERODERMA, DIFFUSE (H): ICD-10-CM

## 2019-06-05 DIAGNOSIS — Z87.39 HISTORY OF GOUT: ICD-10-CM

## 2019-06-05 DIAGNOSIS — I10 ESSENTIAL HYPERTENSION: ICD-10-CM

## 2019-06-05 DIAGNOSIS — Z94.0 HISTORY OF KIDNEY TRANSPLANT: ICD-10-CM

## 2019-06-05 DIAGNOSIS — D64.9 ANEMIA, UNSPECIFIED TYPE: ICD-10-CM

## 2019-06-05 DIAGNOSIS — G47.00 INSOMNIA, UNSPECIFIED TYPE: Primary | ICD-10-CM

## 2019-06-05 DIAGNOSIS — M79.7 FIBROMYALGIA: ICD-10-CM

## 2019-06-05 DIAGNOSIS — F12.20 CANNABIS DEPENDENCE (H): ICD-10-CM

## 2019-06-05 DIAGNOSIS — Z01.84 IMMUNITY STATUS TESTING: ICD-10-CM

## 2019-06-05 DIAGNOSIS — C22.0 LIVER CELL CARCINOMA (H): ICD-10-CM

## 2019-06-05 DIAGNOSIS — C53.9 MALIGNANT NEOPLASM OF CERVIX, UNSPECIFIED SITE (H): ICD-10-CM

## 2019-06-05 DIAGNOSIS — Z94.4 HISTORY OF LIVER TRANSPLANT (H): ICD-10-CM

## 2019-06-05 DIAGNOSIS — E78.5 HYPERLIPIDEMIA WITH TARGET LDL LESS THAN 100: ICD-10-CM

## 2019-06-05 DIAGNOSIS — F33.1 MAJOR DEPRESSIVE DISORDER, RECURRENT EPISODE, MODERATE (H): ICD-10-CM

## 2019-06-05 PROCEDURE — 36415 COLL VENOUS BLD VENIPUNCTURE: CPT | Performed by: FAMILY MEDICINE

## 2019-06-05 PROCEDURE — 99214 OFFICE O/P EST MOD 30 MIN: CPT | Performed by: FAMILY MEDICINE

## 2019-06-05 ASSESSMENT — PATIENT HEALTH QUESTIONNAIRE - PHQ9
SUM OF ALL RESPONSES TO PHQ QUESTIONS 1-9: 9
5. POOR APPETITE OR OVEREATING: MORE THAN HALF THE DAYS

## 2019-06-05 ASSESSMENT — ANXIETY QUESTIONNAIRES
7. FEELING AFRAID AS IF SOMETHING AWFUL MIGHT HAPPEN: NEARLY EVERY DAY
1. FEELING NERVOUS, ANXIOUS, OR ON EDGE: MORE THAN HALF THE DAYS
3. WORRYING TOO MUCH ABOUT DIFFERENT THINGS: MORE THAN HALF THE DAYS
5. BEING SO RESTLESS THAT IT IS HARD TO SIT STILL: MORE THAN HALF THE DAYS
6. BECOMING EASILY ANNOYED OR IRRITABLE: MORE THAN HALF THE DAYS
IF YOU CHECKED OFF ANY PROBLEMS ON THIS QUESTIONNAIRE, HOW DIFFICULT HAVE THESE PROBLEMS MADE IT FOR YOU TO DO YOUR WORK, TAKE CARE OF THINGS AT HOME, OR GET ALONG WITH OTHER PEOPLE: SOMEWHAT DIFFICULT
GAD7 TOTAL SCORE: 15
2. NOT BEING ABLE TO STOP OR CONTROL WORRYING: MORE THAN HALF THE DAYS

## 2019-06-05 NOTE — Clinical Note
Looking for additional medication choices to add to her Cymbalta for anxiety. She is a renal transplant patient. Ideas.

## 2019-06-05 NOTE — PATIENT INSTRUCTIONS
Caress soap- Dove unscented  Claritin 5 mg OTC prn itching  Eucerin, Cetaphil, Amlactin lotions OTC    MTM message sent to look at other medications that we can use for anxiety safe for your transplant status    Labs today

## 2019-06-06 ASSESSMENT — ANXIETY QUESTIONNAIRES: GAD7 TOTAL SCORE: 15

## 2019-06-07 LAB
MUV IGG SER QL IA: 21.7 AU/ML
RUBELLA ANTIBODY IGG: 4 INDEX
RUBEOLA IGG ANTIBODY: 54.6 AU/ML

## 2019-07-09 ENCOUNTER — TELEPHONE (OUTPATIENT)
Dept: FAMILY MEDICINE | Facility: CLINIC | Age: 54
End: 2019-07-09

## 2019-07-09 DIAGNOSIS — Z94.0 HISTORY OF KIDNEY TRANSPLANT: ICD-10-CM

## 2019-07-09 DIAGNOSIS — I10 HTN (HYPERTENSION): Primary | ICD-10-CM

## 2019-07-10 NOTE — TELEPHONE ENCOUNTER
Linda Browning MD Davis, Jenelle, RN             Please let the patient know simona's suggestions.   Order the futured BMP lab if not done elsewhere recently.   See if the patient wants to try a trial any of the medications. If so she should schedule to review and discuss prescription.    Previous Messages      ----- Message -----   From: Simona Galan RPH   Sent: 6/5/2019  10:47 AM   To: Linda Browning MD     I would get an update BMP since we don't have one dating back to Aug 2018 that I saw, but if stable from that point here are some thoughts.     Buspirone could be tried, but would just watch for concerns with serotonin syndrome since used with duloxetine can increase risk of that.     Or Abilify could be used low dose with duloxetine without impacting her transplant meds. This combination of ablify and duloxetine can increase exposure to abilify so would start low does (2mg and then titrate from there).     Simona Galan, Pharm.D, [facility]   Medication Therapy Management Pharmacist   [phone]         ----- Message -----   From: Linda Browning MD   Sent: 6/5/2019   9:14 AM   To: Simona Galan RPH     Looking for additional medication choices to add to her Cymbalta for anxiety. She is a renal transplant patient. Ideas.

## 2019-07-10 NOTE — TELEPHONE ENCOUNTER
July 9, 2019 7:47 PM   Left message for patient to make appt with Dr. Browning to discuss anxiety med options if interested. Future order placed for BMP.  Jazmin Way RN

## 2019-07-15 NOTE — PROGRESS NOTES
Med check/lab   Fasting    Subjective     Nani Ford is a 53 year old female who presents to clinic today for the following health issues:  TRANSPLANT PATIENT  [facility] LABS NEEDED TODAY (SNEHA signed for records)  White female olesya  Sleep 6-7 hours total in 24 hours, nap no, devices no, nocturia x2  Appetite ok  Breakfast fasting today. Coffee  Lunch skips  Snack skips  6 pm Meat, salad, veggie  Exercise walking 2 miles- last week 2 times, gardening    Fall/Seizure/LOC no    JUNE  PHQ=9  MIMI=15  Today   PHQ=17  MIMI=19  Message sent to Yvonne CARDOZO  for possible counseling options  I need to know her current liver/kidney status labs in order to see if mental health medication prescribing would be safe.      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 Last time worked 2014.  Currently on full disability      Smoking no  ETOH no  Street drugs/[employer] Does not have her certificate here today.   Caffeine coffee one pot     Travel plans no  Exposure yes  cold head cold 2 weeks ago, then she got it, then better but reports cough- sometimes yellow mucous, no blood.    Migraines 2 in last week. [place] using up to 3 each event.   6-8 HA in the last month.  Neurology [facility] workup years ago. Could consider F/u with neurology to see if she can get better control.  Aura yes  Nausea no  Smells no    Left arm sometimes tingling and numb when she has a HA, every HA. RHD.New as of 2 months ago.   Also gets this at times without HA, like when crafting.    Fibromyalgia pain 8/10, lowest 2-3/10 a few days ago  Weather makes a difference. We have been having thunderstorms here lately.    Mood usual per her report    BP Readings from Last 3 Encounters:   07/16/19 122/78   06/05/19 122/82   02/13/19 126/86     LDL Cholesterol Calculated   Date Value Ref Range Status    10/15/2018 137 (H) 0 - 99 mg/dL Final     Hemoglobin   Date Value Ref Range Status   2019 13.7 11.8 - 15.5 g/dl Final   2018 13.9 11.8 - 15.5 g/dl Final                 Patient Active Problem List   Diagnosis     History of liver transplant (H)     HTN (hypertension)     History of kidney transplant     Hyperlipidemia with target LDL less than 100     History of gout     Anemia     Scleroderma, diffuse (H)     Raynaud phenomenon     Health Care Home     Fibromyalgia     Major depressive disorder, recurrent episode, moderate (H)     Insomnia, unspecified type     Cervical cancer (H)     Complication of transplanted kidney     Cannabis dependence (H)     Major depressive disorder, single episode, moderate (H)     Liver cell carcinoma (H)     Obesity (BMI 35.0-39.9) with comorbidity (H)     Past Surgical History:   Procedure Laterality Date     BIOPSY       GI SURGERY       RETURN LIVER TRANSPLANT      21 years ago     TRANSPLANT         Social History     Tobacco Use     Smoking status: Former Smoker     Packs/day: 0.30     Years: 30.00     Pack years: 9.00     Types: Cigarettes     Last attempt to quit: 10/30/2012     Years since quittin.7     Smokeless tobacco: Never Used   Substance Use Topics     Alcohol use: No     Alcohol/week: 0.0 oz     Family History   Problem Relation Age of Onset     Substance Abuse Sister          Current Outpatient Medications   Medication Sig Dispense Refill     amLODIPine (NORVASC) 5 MG tablet TAKE 1 TABLET DAILY 90 tablet 1     aspirin 81 MG tablet Take 81 mg by mouth daily.       butalbital-acetaminophen-caffeine (FIORICET/ESGIC) -40 MG tablet Take 1 tablet by mouth every 4 hours as needed for headaches 30 tablet 0     Calcium Carb-Cholecalciferol 600-800 MG-UNIT TABS Take 1 tablet by mouth       DULoxetine (CYMBALTA) 60 MG capsule 120 mg daily (Patient taking differently: 60 mg 2 times daily 120 mg daily) 180 capsule 3     metoprolol (TOPROL-XL) 25 MG 24 hr  tablet Take 25 mg by mouth daily.       mycophenolate (CELLCEPT - GENERIC EQUIVALENT) 250 MG capsule   3     omeprazole (PRILOSEC) 40 MG DR capsule TAKE 1 CAPSULE(40 MG) BY MOUTH TWICE DAILY 180 capsule 3     predniSONE (DELTASONE) 5 MG tablet Take 1 tablet (5 mg) by mouth daily       tacrolimus (GENERIC EQUIVALENT) 0.5 MG capsule Managed by pt's transplant team at [facility]     tacrolimus (GENERIC EQUIVALENT) 1 MG capsule Rx managed by transplant team.       zolpidem (AMBIEN) 10 MG tablet TAKE 1 TABLET BY MOUTH AT BEDTIME 30 tablet 3     Allergies   Allergen Reactions     Acetaminophen-Codeine      Other reaction(s): GI intolerance     Chlorhexidine Other (See Comments)     burning     Codeine Other (See Comments)     Codeine Sulfate Nausea and Vomiting     Diatrizoate Other (See Comments)     Patient has a transplanted kidney so dye should be with caution if at all.  Patient has a transplanted kidney so dye should be with caution if at all.     Hydrocodone-Acetaminophen Other (See Comments)     doesn't work     Hydromorphone Nausea and Vomiting     Recent Labs   Lab Test 10/15/18  0900 08/16/18  1205 07/10/18  0918 03/28/18  0946  10/07/16  1016  10/29/15  1702  02/16/15  1619  11/15/12  1135  02/22/12  1211   A1C  --   --   --   --   --   --   --   --   --   --   --   --   --  4.5   *  --   --   --   --  159*  --  87  --   --    < >  --   --   --    HDL 56  --   --   --   --  61  --  47  --   --    < >  --   --   --    TRIG 131  --   --   --   --  108  --  134  --   --    < >  --   --   --    ALT  --  7 13 9   < >  --    < > 18   < >  --    < > 41   < > 73*   CR  --  1.19* 1.34* 1.24*   < >  --    < > 1.49*   < >  --    < > 6.64*   < > 2.55*   GFRESTIMATED  --   --   --   --   --   --   --   --   --  30*  --  7*   < >  --    GFRESTBLACK  --   --   --   --   --   --   --   --   --  36*  --  8*   < >  --    POTASSIUM  --  4.6 4.6 4.2   < >  --    < > 4.6   < >  --    < > 4.3   < > 4.8    < > = values in this  interval not displayed.      BP Readings from Last 3 Encounters:   07/16/19 122/78   06/05/19 122/82   02/13/19 126/86    Wt Readings from Last 3 Encounters:   07/16/19 84.8 kg (187 lb)   06/05/19 84.8 kg (187 lb)   02/13/19 85.8 kg (189 lb 3.2 oz)                      Reviewed and updated as needed this visit by Provider         Review of Systems   ROS COMP: Constitutional, HEENT, cardiovascular, pulmonary, GI, , musculoskeletal, neuro, skin, endocrine and psych systems are negative, except as otherwise noted.      Objective    /78   Pulse 77   Resp 16   Wt 84.8 kg (187 lb)   SpO2 99%   BMI 34.76 kg/m    There is no height or weight on file to calculate BMI.  Physical Exam   GENERAL: healthy, alert and no distress  EYES: Eyes grossly normal to inspection, PERRL and conjunctivae and sclerae normal  HENT: ear canals and TM's normal, nose and mouth without ulcers or lesions  NECK: no adenopathy, no asymmetry, masses, or scars and thyroid normal to palpation  RESP: lungs clear to auscultation - no rales, rhonchi or wheezes  CV: regular rate and rhythm, normal S1 S2, no S3 or S4, no murmur, click or rub, no peripheral edema and peripheral pulses strong  ABDOMEN: soft, nontender, no hepatosplenomegaly, no masses and bowel sounds normal TRANSPLANT LIVER/KIDNEY PATIENT  MS: no gross musculoskeletal defects noted, no edema  SKIN: no suspicious lesions or rashes  NEURO: Normal strength and tone, mentation intact and speech normal  PSYCH: mentation appears normal, affect normal/bright  LYMPH: no cervical, supraclavicular, axillary, or inguinal adenopathy    Diagnostic Test Results:  Labs reviewed in Epic  Results for orders placed or performed in visit on 06/05/19   Measles/Mumps/Rubella Immunity ([facility])   Result Value Ref Range    Rubella Antibody IgG 4.00 Immune >0.99 index    Rubeola IgG Antibody 54.6 Immune >29.9 AU/mL    Mumps IgG Antibody 21.7 Immune >10.9 AU/mL    Narrative    Performed at:   - [facility]  [place] [address], [place], [place]  [id number]  : Zheng Garcia MD, Phone:  [phone]  Performed at:  02 - [facility] [address], [place], [place]  [id number]  : Tyrone Ly MD, Phone:  [phone]           Assessment & Plan       ICD-10-CM    1. History of kidney transplant Z94.0 Comp. Metabolic Panel (14) ([facility])   2. Nonintractable headache, unspecified chronicity pattern, unspecified headache type R51 butalbital-acetaminophen-caffeine (FIORICET/ESGIC) -40 MG tablet   3. Essential hypertension I10 Comp. Metabolic Panel (14) ([facility])   4. History of liver transplant (H) Z94.4 Comp. Metabolic Panel (14) ([facility])   5. Anemia, unspecified type D64.9 CBC with Diff/Plt ([facility])   6. Fibromyalgia M79.7    7. Major depressive disorder, recurrent episode, moderate (H) F33.1 CARE COORDINATION REFERRAL   8. Cannabis dependence (H) F12.20    9. Scleroderma, diffuse (H) M34.9    10. Hyperlipidemia with target LDL less than 100 E78.5    11. Insomnia, unspecified type G47.00    12. MIMI (generalized anxiety disorder) F41.1 CARE COORDINATION REFERRAL          Patient Instructions   [place] volunteer web site    [facility] today and [facility] lab today    Yvonne CUMMINGS  will call you about counseling/psychiatry options    SNEHA done for [facility] Records    Refill done for Lisa Browning MD  [facility]  40 min spent in direct face to face time with this pt, greater than 50% in counseling Complex medical transplant patient and coordination of care.

## 2019-07-16 ENCOUNTER — OFFICE VISIT (OUTPATIENT)
Dept: FAMILY MEDICINE | Facility: CLINIC | Age: 54
End: 2019-07-16

## 2019-07-16 ENCOUNTER — PATIENT OUTREACH (OUTPATIENT)
Dept: CARE COORDINATION | Facility: CLINIC | Age: 54
End: 2019-07-16

## 2019-07-16 VITALS
OXYGEN SATURATION: 99 % | BODY MASS INDEX: 34.76 KG/M2 | DIASTOLIC BLOOD PRESSURE: 78 MMHG | WEIGHT: 187 LBS | HEART RATE: 77 BPM | RESPIRATION RATE: 16 BRPM | SYSTOLIC BLOOD PRESSURE: 122 MMHG

## 2019-07-16 DIAGNOSIS — G47.00 INSOMNIA, UNSPECIFIED TYPE: ICD-10-CM

## 2019-07-16 DIAGNOSIS — D64.9 ANEMIA, UNSPECIFIED TYPE: ICD-10-CM

## 2019-07-16 DIAGNOSIS — M34.9 SCLERODERMA, DIFFUSE (H): ICD-10-CM

## 2019-07-16 DIAGNOSIS — F41.1 GAD (GENERALIZED ANXIETY DISORDER): ICD-10-CM

## 2019-07-16 DIAGNOSIS — E78.5 HYPERLIPIDEMIA WITH TARGET LDL LESS THAN 100: ICD-10-CM

## 2019-07-16 DIAGNOSIS — I10 ESSENTIAL HYPERTENSION: ICD-10-CM

## 2019-07-16 DIAGNOSIS — Z94.0 HISTORY OF KIDNEY TRANSPLANT: Primary | ICD-10-CM

## 2019-07-16 DIAGNOSIS — F33.1 MAJOR DEPRESSIVE DISORDER, RECURRENT EPISODE, MODERATE (H): ICD-10-CM

## 2019-07-16 DIAGNOSIS — Z94.4 HISTORY OF LIVER TRANSPLANT (H): ICD-10-CM

## 2019-07-16 DIAGNOSIS — R51.9 NONINTRACTABLE HEADACHE, UNSPECIFIED CHRONICITY PATTERN, UNSPECIFIED HEADACHE TYPE: ICD-10-CM

## 2019-07-16 DIAGNOSIS — M79.7 FIBROMYALGIA: ICD-10-CM

## 2019-07-16 DIAGNOSIS — F12.20 CANNABIS DEPENDENCE (H): ICD-10-CM

## 2019-07-16 LAB
% GRANULOCYTES: 69.5 % (ref 42.2–75.2)
HCT VFR BLD AUTO: 40.6 % (ref 35–46)
HEMOGLOBIN: 13.4 G/DL (ref 11.8–15.5)
LYMPHOCYTES NFR BLD AUTO: 23.4 % (ref 20.5–51.1)
MCH RBC QN AUTO: 27.1 PG (ref 27–31)
MCHC RBC AUTO-ENTMCNC: 33.1 G/DL (ref 33–37)
MCV RBC AUTO: 81.8 FL (ref 80–100)
MONOCYTES NFR BLD AUTO: 7.1 % (ref 1.7–9.3)
PLATELET # BLD AUTO: 276 K/UL (ref 140–450)
RBC # BLD AUTO: 4.96 X10/CMM (ref 3.7–5.2)
WBC # BLD AUTO: 7.8 X10/CMM (ref 3.8–11)

## 2019-07-16 PROCEDURE — 36415 COLL VENOUS BLD VENIPUNCTURE: CPT | Performed by: FAMILY MEDICINE

## 2019-07-16 PROCEDURE — 85025 COMPLETE CBC W/AUTO DIFF WBC: CPT | Performed by: FAMILY MEDICINE

## 2019-07-16 PROCEDURE — 99215 OFFICE O/P EST HI 40 MIN: CPT | Performed by: FAMILY MEDICINE

## 2019-07-16 RX ORDER — BUTALBITAL, ACETAMINOPHEN AND CAFFEINE 50; 325; 40 MG/1; MG/1; MG/1
1 TABLET ORAL EVERY 4 HOURS PRN
Qty: 30 TABLET | Refills: 0 | Status: SHIPPED | OUTPATIENT
Start: 2019-07-16 | End: 2019-08-19

## 2019-07-16 ASSESSMENT — ANXIETY QUESTIONNAIRES
5. BEING SO RESTLESS THAT IT IS HARD TO SIT STILL: MORE THAN HALF THE DAYS
GAD7 TOTAL SCORE: 19
1. FEELING NERVOUS, ANXIOUS, OR ON EDGE: NEARLY EVERY DAY
IF YOU CHECKED OFF ANY PROBLEMS ON THIS QUESTIONNAIRE, HOW DIFFICULT HAVE THESE PROBLEMS MADE IT FOR YOU TO DO YOUR WORK, TAKE CARE OF THINGS AT HOME, OR GET ALONG WITH OTHER PEOPLE: VERY DIFFICULT
2. NOT BEING ABLE TO STOP OR CONTROL WORRYING: NEARLY EVERY DAY
6. BECOMING EASILY ANNOYED OR IRRITABLE: NEARLY EVERY DAY
3. WORRYING TOO MUCH ABOUT DIFFERENT THINGS: NEARLY EVERY DAY
7. FEELING AFRAID AS IF SOMETHING AWFUL MIGHT HAPPEN: NEARLY EVERY DAY

## 2019-07-16 ASSESSMENT — PATIENT HEALTH QUESTIONNAIRE - PHQ9
SUM OF ALL RESPONSES TO PHQ QUESTIONS 1-9: 17
5. POOR APPETITE OR OVEREATING: MORE THAN HALF THE DAYS

## 2019-07-16 NOTE — PATIENT INSTRUCTIONS
[place] volunteer web site    [facility] today and [facility] lab today    Yvonne CUMMINGS  will call you about counseling/psychiatry options    SNEHA done for [facility] Records    Refill done for Lisa

## 2019-07-16 NOTE — PROGRESS NOTES
for cbc from [phone] - Dr JULIANE Saba- Hepatology & Liver transplantion- cbc every 3 mo and unless directed otherwise- from 1/18/19- orders not set up correctly    Ashley Berry MA July 16, 2019 3:53 PM

## 2019-07-17 LAB
ALBUMIN SERPL-MCNC: 4.3 G/DL (ref 3.5–5.5)
ALBUMIN/GLOB SERPL: 1.9 {RATIO} (ref 1.2–2.2)
ALP SERPL-CCNC: 52 IU/L (ref 39–117)
ALT SERPL-CCNC: 8 IU/L (ref 0–32)
AST SERPL-CCNC: 16 IU/L (ref 0–40)
BILIRUB SERPL-MCNC: 0.3 MG/DL (ref 0–1.2)
BUN SERPL-MCNC: 25 MG/DL (ref 6–24)
BUN/CREATININE RATIO: 23 (ref 9–23)
CALCIUM SERPL-MCNC: 9.5 MG/DL (ref 8.7–10.2)
CHLORIDE SERPLBLD-SCNC: 101 MMOL/L (ref 96–106)
CREAT SERPL-MCNC: 1.07 MG/DL (ref 0.57–1)
EGFR IF AFRICN AM: 69 ML/MIN/1.73
EGFR IF NONAFRICN AM: 60 ML/MIN/1.73
GLOBULIN, TOTAL: 2.3 G/DL (ref 1.5–4.5)
GLUCOSE SERPL-MCNC: 79 MG/DL (ref 65–99)
POTASSIUM SERPL-SCNC: 4.9 MMOL/L (ref 3.5–5.2)
PROT SERPL-MCNC: 6.6 G/DL (ref 6–8.5)
SODIUM SERPL-SCNC: 140 MMOL/L (ref 134–144)
TOTAL CO2: 23 MMOL/L (ref 20–29)

## 2019-07-17 ASSESSMENT — ANXIETY QUESTIONNAIRES: GAD7 TOTAL SCORE: 19

## 2019-07-18 ENCOUNTER — PATIENT OUTREACH (OUTPATIENT)
Dept: CARE COORDINATION | Facility: CLINIC | Age: 54
End: 2019-07-18

## 2019-07-18 NOTE — PROGRESS NOTES
Clinic Care Coordination Contact  [facility]/Voicemail    Reason for Referral: Mental Wellness (Health) (Mental Illness/Chemical Depedency): Wants information for counseling and psychiatry. S/p Transplant patient liver/lung, Fibromyalgia and chronic pain issues.    Additional pertinent details:  See [facility] following transplant    Patient is interested in possible therapy options for mood.      Clinical Data: Care Coordinator Outreach    Outreach attempted x 1.  Writer left a message on patient's voicemail with call back information and requested return call.    Plan: Await return call from patient. If no response, Social Work Care Coordinator will try to reach patient again in 3-5 business days.    Yvonne Cruz, [facility] Care Coordination  Clinics: [facility] and [facility]   Email: [email or web address]  Tele: [phone]

## 2019-07-26 ENCOUNTER — PATIENT OUTREACH (OUTPATIENT)
Dept: CARE COORDINATION | Facility: CLINIC | Age: 54
End: 2019-07-26

## 2019-07-26 NOTE — LETTER
CARE COORDINATION  [facility] [address] [place]  [address]      July 29, 2019    Nani Ford  [address]  [facility] [address]      Dear Nani,    I am a Social Work Clinic Care Coordinator who works with Dr. Linda Browning at [facility]. I have been trying to reach you recently to introduce Clinic Care Coordination and to see if there was anything I could assist you with. Below is a description of clinic care coordination.    The clinic care coordinator is a registered nurse and/or  who understand the health care system. The goal of clinic care coordination is to help you manage your health and improve access to the [facility] in the most efficient manner. The registered nurse can assist you in meeting your health care goals by providing education, coordinating services, and strengthening the communication among your providers. The  can assist you with financial, behavioral, psychosocial, chemical dependency, counseling, and/or psychiatric resources.    Please feel free to contact me at [phone] or [phone] with questions or concerns.    Sincerely,         FABRICIO Fleming  [facility] Care Coordination  Clinics: [facility]   Email: [email or web address]  Tele: [phone]

## 2019-07-26 NOTE — PROGRESS NOTES
Clinic Care Coordination Contact  [facility]/Voicemail     Reason for Referral: Mental Wellness (Health) (Mental Illness/Chemical Depedency): Wants information for counseling and psychiatry. S/p Transplant patient liver/lung, Fibromyalgia and chronic pain issues.    Additional pertinent details:  See [facility] following transplant     Patient is interested in possible therapy options for mood.      Clinical Data: Care Coordinator Outreach     Outreach attempted x 2.  Writer left a message on patient's voicemail with call back information and requested return call.     Plan: Await return call from patient. If no response within several days, Social Work Care Coordinator will mail a Care Coordination introduction letter to patient.       Yvonne Cruz [facility] Care Coordination  Clinics: [facility]   Email: [email or web address]  Tele: [phone]        Addendum  7/29/19  Data: Writer has not heard from patient since writer left a voice message on 7/26/19.     Writer mailed out care coordination introduction letter with care coordinator contact information and explanation of care coordination services to patient today.     Social Work Care Coordinator will plan no further outreaches at this time.      Yvonne Cruz [facility] Care Coordination  Clinics: [facility]   Email: [email or web address]  Tele: [phone]

## 2019-08-16 ENCOUNTER — MYC MEDICAL ADVICE (OUTPATIENT)
Dept: FAMILY MEDICINE | Facility: CLINIC | Age: 54
End: 2019-08-16

## 2019-08-16 DIAGNOSIS — R51.9 NONINTRACTABLE HEADACHE, UNSPECIFIED CHRONICITY PATTERN, UNSPECIFIED HEADACHE TYPE: ICD-10-CM

## 2019-08-16 NOTE — TELEPHONE ENCOUNTER
Last OV 7/16/19, no future appointment scheduled.   Contract signed 2/13/19, no toxassure on file. Last fills Fioricet #[id number] 7/17/19, 5/24/19, 1/18/19. Prescription refill routed to Dr Browning for review.   Ericka Brown

## 2019-08-19 RX ORDER — BUTALBITAL, ACETAMINOPHEN AND CAFFEINE 50; 325; 40 MG/1; MG/1; MG/1
1 TABLET ORAL EVERY 4 HOURS PRN
Qty: 30 TABLET | Refills: 0 | Status: SHIPPED | OUTPATIENT
Start: 2019-08-19 | End: 2019-09-16

## 2019-08-27 ENCOUNTER — TELEPHONE (OUTPATIENT)
Dept: FAMILY MEDICINE | Facility: CLINIC | Age: 54
End: 2019-08-27

## 2019-08-27 NOTE — TELEPHONE ENCOUNTER
Received fax from [employer] on 06/27/19 requesting PA be done for Zolpidem.  Submitted through [employer].  Had to fill out another form that was faxed back to [employer] on 06/28. Did not receive paper authorization so checked on [employer] and found out PA was approved. Approval dates 05/27/2019-06/27/2020.

## 2019-09-16 DIAGNOSIS — G47.01 INSOMNIA DUE TO MEDICAL CONDITION: ICD-10-CM

## 2019-09-16 DIAGNOSIS — R51.9 NONINTRACTABLE HEADACHE, UNSPECIFIED CHRONICITY PATTERN, UNSPECIFIED HEADACHE TYPE: ICD-10-CM

## 2019-09-16 NOTE — TELEPHONE ENCOUNTER
Patient sent the following [employer] message pertaining to refills requested in this encounter:   Nani Ford   to Loyd Morley MD            9/16/19 4:06 PM   Hi I just placed a prescription for Zolipem 10 mg & my headache medication. They both need  authorization. I send in a couple days earlier than usual as we are going out of town on Thursday and won't be back till the following Friday.     Thank you   Nani Ford

## 2019-09-16 NOTE — TELEPHONE ENCOUNTER
Refill medication Ambien and Fioricet  LOV 07/16/2019  Danielle Tobin MA on 9/16/2019 at 2:35 PM

## 2019-09-17 RX ORDER — ZOLPIDEM TARTRATE 10 MG/1
TABLET ORAL
Qty: 30 TABLET | Refills: 0 | Status: SHIPPED | OUTPATIENT
Start: 2019-09-17 | End: 2019-10-13

## 2019-09-17 RX ORDER — BUTALBITAL, ACETAMINOPHEN AND CAFFEINE 50; 325; 40 MG/1; MG/1; MG/1
TABLET ORAL
Qty: 30 TABLET | Refills: 0 | Status: SHIPPED | OUTPATIENT
Start: 2019-09-17 | End: 2019-10-18

## 2019-10-10 ENCOUNTER — HOSPITAL ENCOUNTER (INPATIENT)
Facility: CLINIC | Age: 54
LOS: 1 days | Discharge: HOME | End: 2019-10-11
Attending: EMERGENCY MEDICINE | Admitting: INTERNAL MEDICINE
Payer: COMMERCIAL

## 2019-10-10 ENCOUNTER — APPOINTMENT (OUTPATIENT)
Dept: GENERAL RADIOLOGY | Facility: CLINIC | Age: 54
End: 2019-10-10
Attending: EMERGENCY MEDICINE
Payer: COMMERCIAL

## 2019-10-10 ENCOUNTER — APPOINTMENT (OUTPATIENT)
Dept: PHYSICAL THERAPY | Facility: CLINIC | Age: 54
End: 2019-10-10
Payer: COMMERCIAL

## 2019-10-10 ENCOUNTER — SURGERY (OUTPATIENT)
Age: 54
End: 2019-10-10
Payer: COMMERCIAL

## 2019-10-10 ENCOUNTER — APPOINTMENT (OUTPATIENT)
Dept: PHYSICAL THERAPY | Facility: CLINIC | Age: 54
End: 2019-10-10
Attending: STUDENT IN AN ORGANIZED HEALTH CARE EDUCATION/TRAINING PROGRAM
Payer: COMMERCIAL

## 2019-10-10 DIAGNOSIS — I25.10 CORONARY ARTERY DISEASE INVOLVING NATIVE HEART WITHOUT ANGINA PECTORIS, UNSPECIFIED VESSEL OR LESION TYPE: Primary | ICD-10-CM

## 2019-10-10 DIAGNOSIS — I16.1 HYPERTENSIVE EMERGENCY: ICD-10-CM

## 2019-10-10 DIAGNOSIS — I21.3 ST ELEVATION MYOCARDIAL INFARCTION (STEMI), UNSPECIFIED ARTERY (H): ICD-10-CM

## 2019-10-10 LAB
ANION GAP SERPL CALCULATED.3IONS-SCNC: 10 MMOL/L (ref 3–14)
ANION GAP SERPL CALCULATED.3IONS-SCNC: 7 MMOL/L (ref 3–14)
APTT PPP: 33 SEC (ref 22–37)
BASOPHILS # BLD AUTO: 0 10E9/L (ref 0–0.2)
BASOPHILS NFR BLD AUTO: 0.1 %
BUN SERPL-MCNC: 17 MG/DL (ref 7–30)
BUN SERPL-MCNC: 23 MG/DL (ref 7–30)
CALCIUM SERPL-MCNC: 8.8 MG/DL (ref 8.5–10.1)
CALCIUM SERPL-MCNC: 9.9 MG/DL (ref 8.5–10.1)
CHLORIDE SERPL-SCNC: 104 MMOL/L (ref 94–109)
CHLORIDE SERPL-SCNC: 99 MMOL/L (ref 94–109)
CHOLEST SERPL-MCNC: 204 MG/DL
CO2 SERPL-SCNC: 24 MMOL/L (ref 20–32)
CO2 SERPL-SCNC: 24 MMOL/L (ref 20–32)
CREAT SERPL-MCNC: 0.96 MG/DL (ref 0.52–1.04)
CREAT SERPL-MCNC: 1.37 MG/DL (ref 0.52–1.04)
DIFFERENTIAL METHOD BLD: ABNORMAL
EOSINOPHIL # BLD AUTO: 0.1 10E9/L (ref 0–0.7)
EOSINOPHIL NFR BLD AUTO: 0.5 %
ERYTHROCYTE [DISTWIDTH] IN BLOOD BY AUTOMATED COUNT: 14.1 % (ref 10–15)
GFR SERPL CREATININE-BSD FRML MDRD: 44 ML/MIN/{1.73_M2}
GFR SERPL CREATININE-BSD FRML MDRD: 68 ML/MIN/{1.73_M2}
GLUCOSE SERPL-MCNC: 104 MG/DL (ref 70–99)
GLUCOSE SERPL-MCNC: 111 MG/DL (ref 70–99)
HCT VFR BLD AUTO: 46.4 % (ref 35–47)
HDLC SERPL-MCNC: 47 MG/DL
HGB BLD-MCNC: 15.9 G/DL (ref 11.7–15.7)
IMM GRANULOCYTES # BLD: 0 10E9/L (ref 0–0.4)
IMM GRANULOCYTES NFR BLD: 0.3 %
INR PPP: 0.87 (ref 0.86–1.14)
INTERPRETATION ECG - MUSE: NORMAL
KCT BLD-ACNC: 348 SEC (ref 75–150)
LACTATE BLD-SCNC: 1.3 MMOL/L (ref 0.7–2)
LDLC SERPL CALC-MCNC: 144 MG/DL
LYMPHOCYTES # BLD AUTO: 3 10E9/L (ref 0.8–5.3)
LYMPHOCYTES NFR BLD AUTO: 21.9 %
MCH RBC QN AUTO: 28.2 PG (ref 26.5–33)
MCHC RBC AUTO-ENTMCNC: 34.3 G/DL (ref 31.5–36.5)
MCV RBC AUTO: 82 FL (ref 78–100)
MONOCYTES # BLD AUTO: 0.8 10E9/L (ref 0–1.3)
MONOCYTES NFR BLD AUTO: 5.8 %
NEUTROPHILS # BLD AUTO: 9.6 10E9/L (ref 1.6–8.3)
NEUTROPHILS NFR BLD AUTO: 71.4 %
NONHDLC SERPL-MCNC: 157 MG/DL
NRBC # BLD AUTO: 0 10*3/UL
NRBC BLD AUTO-RTO: 0 /100
PLATELET # BLD AUTO: 266 10E9/L (ref 150–450)
POTASSIUM SERPL-SCNC: 3 MMOL/L (ref 3.4–5.3)
POTASSIUM SERPL-SCNC: 3 MMOL/L (ref 3.4–5.3)
POTASSIUM SERPL-SCNC: 5 MMOL/L (ref 3.4–5.3)
RBC # BLD AUTO: 5.63 10E12/L (ref 3.8–5.2)
SODIUM SERPL-SCNC: 133 MMOL/L (ref 133–144)
SODIUM SERPL-SCNC: 135 MMOL/L (ref 133–144)
TRIGL SERPL-MCNC: 66 MG/DL
TROPONIN I BLD-MCNC: 1.01 UG/L (ref 0–0.08)
TROPONIN I SERPL-MCNC: 1.55 UG/L (ref 0–0.04)
WBC # BLD AUTO: 13.5 10E9/L (ref 4–11)

## 2019-10-10 PROCEDURE — 96374 THER/PROPH/DIAG INJ IV PUSH: CPT

## 2019-10-10 PROCEDURE — C1769 GUIDE WIRE: HCPCS | Performed by: INTERNAL MEDICINE

## 2019-10-10 PROCEDURE — 25000128 H RX IP 250 OP 636: Performed by: INTERNAL MEDICINE

## 2019-10-10 PROCEDURE — 99291 CRITICAL CARE FIRST HOUR: CPT | Mod: 25 | Performed by: INTERNAL MEDICINE

## 2019-10-10 PROCEDURE — 027034Z DILATION OF CORONARY ARTERY, ONE ARTERY WITH DRUG-ELUTING INTRALUMINAL DEVICE, PERCUTANEOUS APPROACH: ICD-10-PCS | Performed by: INTERNAL MEDICINE

## 2019-10-10 PROCEDURE — 25000128 H RX IP 250 OP 636: Performed by: EMERGENCY MEDICINE

## 2019-10-10 PROCEDURE — 25000132 ZZH RX MED GY IP 250 OP 250 PS 637: Performed by: EMERGENCY MEDICINE

## 2019-10-10 PROCEDURE — C1887 CATHETER, GUIDING: HCPCS | Performed by: INTERNAL MEDICINE

## 2019-10-10 PROCEDURE — B2111ZZ FLUOROSCOPY OF MULTIPLE CORONARY ARTERIES USING LOW OSMOLAR CONTRAST: ICD-10-PCS | Performed by: INTERNAL MEDICINE

## 2019-10-10 PROCEDURE — 96375 TX/PRO/DX INJ NEW DRUG ADDON: CPT

## 2019-10-10 PROCEDURE — 92941 PRQ TRLML REVSC TOT OCCL AMI: CPT | Mod: LD | Performed by: INTERNAL MEDICINE

## 2019-10-10 PROCEDURE — 84132 ASSAY OF SERUM POTASSIUM: CPT | Performed by: INTERNAL MEDICINE

## 2019-10-10 PROCEDURE — 99223 1ST HOSP IP/OBS HIGH 75: CPT | Mod: AI | Performed by: HOSPITALIST

## 2019-10-10 PROCEDURE — 93005 ELECTROCARDIOGRAM TRACING: CPT

## 2019-10-10 PROCEDURE — 93458 L HRT ARTERY/VENTRICLE ANGIO: CPT | Mod: 26 | Performed by: INTERNAL MEDICINE

## 2019-10-10 PROCEDURE — 21000001 ZZH R&B HEART CARE

## 2019-10-10 PROCEDURE — C9606 PERC D-E COR REVASC W AMI S: HCPCS | Mod: LD | Performed by: INTERNAL MEDICINE

## 2019-10-10 PROCEDURE — 25000131 ZZH RX MED GY IP 250 OP 636 PS 637: Performed by: INTERNAL MEDICINE

## 2019-10-10 PROCEDURE — 36415 COLL VENOUS BLD VENIPUNCTURE: CPT | Performed by: HOSPITALIST

## 2019-10-10 PROCEDURE — 36415 COLL VENOUS BLD VENIPUNCTURE: CPT | Performed by: INTERNAL MEDICINE

## 2019-10-10 PROCEDURE — 85610 PROTHROMBIN TIME: CPT | Performed by: EMERGENCY MEDICINE

## 2019-10-10 PROCEDURE — 4A023N7 MEASUREMENT OF CARDIAC SAMPLING AND PRESSURE, LEFT HEART, PERCUTANEOUS APPROACH: ICD-10-PCS | Performed by: INTERNAL MEDICINE

## 2019-10-10 PROCEDURE — 84484 ASSAY OF TROPONIN QUANT: CPT

## 2019-10-10 PROCEDURE — 93010 ELECTROCARDIOGRAM REPORT: CPT | Performed by: INTERNAL MEDICINE

## 2019-10-10 PROCEDURE — 99152 MOD SED SAME PHYS/QHP 5/>YRS: CPT | Performed by: INTERNAL MEDICINE

## 2019-10-10 PROCEDURE — 99153 MOD SED SAME PHYS/QHP EA: CPT | Performed by: INTERNAL MEDICINE

## 2019-10-10 PROCEDURE — 83605 ASSAY OF LACTIC ACID: CPT | Performed by: HOSPITALIST

## 2019-10-10 PROCEDURE — 93458 L HRT ARTERY/VENTRICLE ANGIO: CPT | Performed by: INTERNAL MEDICINE

## 2019-10-10 PROCEDURE — 25000132 ZZH RX MED GY IP 250 OP 250 PS 637: Performed by: NURSE PRACTITIONER

## 2019-10-10 PROCEDURE — 40000275 ZZH STATISTIC RCP TIME EA 10 MIN

## 2019-10-10 PROCEDURE — 80048 BASIC METABOLIC PNL TOTAL CA: CPT | Performed by: EMERGENCY MEDICINE

## 2019-10-10 PROCEDURE — 25000132 ZZH RX MED GY IP 250 OP 250 PS 637

## 2019-10-10 PROCEDURE — 97110 THERAPEUTIC EXERCISES: CPT | Mod: GP

## 2019-10-10 PROCEDURE — C1725 CATH, TRANSLUMIN NON-LASER: HCPCS | Performed by: INTERNAL MEDICINE

## 2019-10-10 PROCEDURE — 25000132 ZZH RX MED GY IP 250 OP 250 PS 637: Performed by: STUDENT IN AN ORGANIZED HEALTH CARE EDUCATION/TRAINING PROGRAM

## 2019-10-10 PROCEDURE — 25000132 ZZH RX MED GY IP 250 OP 250 PS 637: Performed by: INTERNAL MEDICINE

## 2019-10-10 PROCEDURE — 84484 ASSAY OF TROPONIN QUANT: CPT | Performed by: EMERGENCY MEDICINE

## 2019-10-10 PROCEDURE — 97530 THERAPEUTIC ACTIVITIES: CPT | Mod: GP

## 2019-10-10 PROCEDURE — 85025 COMPLETE CBC W/AUTO DIFF WBC: CPT | Performed by: EMERGENCY MEDICINE

## 2019-10-10 PROCEDURE — 80048 BASIC METABOLIC PNL TOTAL CA: CPT | Performed by: HOSPITALIST

## 2019-10-10 PROCEDURE — 80061 LIPID PANEL: CPT | Performed by: STUDENT IN AN ORGANIZED HEALTH CARE EDUCATION/TRAINING PROGRAM

## 2019-10-10 PROCEDURE — 97161 PT EVAL LOW COMPLEX 20 MIN: CPT | Mod: GP

## 2019-10-10 PROCEDURE — 27210794 ZZH OR GENERAL SUPPLY STERILE: Performed by: INTERNAL MEDICINE

## 2019-10-10 PROCEDURE — 25000128 H RX IP 250 OP 636

## 2019-10-10 PROCEDURE — 25000132 ZZH RX MED GY IP 250 OP 250 PS 637: Performed by: HOSPITALIST

## 2019-10-10 PROCEDURE — 99285 EMERGENCY DEPT VISIT HI MDM: CPT

## 2019-10-10 PROCEDURE — C1874 STENT, COATED/COV W/DEL SYS: HCPCS | Performed by: INTERNAL MEDICINE

## 2019-10-10 PROCEDURE — 25000128 H RX IP 250 OP 636: Performed by: STUDENT IN AN ORGANIZED HEALTH CARE EDUCATION/TRAINING PROGRAM

## 2019-10-10 PROCEDURE — 99207 ZZC NON-BILLABLE SERV PER CHARTING: CPT | Performed by: INTERNAL MEDICINE

## 2019-10-10 PROCEDURE — 71045 X-RAY EXAM CHEST 1 VIEW: CPT

## 2019-10-10 PROCEDURE — 25000125 ZZHC RX 250: Performed by: INTERNAL MEDICINE

## 2019-10-10 PROCEDURE — 85730 THROMBOPLASTIN TIME PARTIAL: CPT | Performed by: EMERGENCY MEDICINE

## 2019-10-10 PROCEDURE — 85347 COAGULATION TIME ACTIVATED: CPT

## 2019-10-10 DEVICE — STENT SYNERGY DRUG ELUTING 2.25X20MM  H7493926020220: Type: IMPLANTABLE DEVICE | Status: FUNCTIONAL

## 2019-10-10 RX ORDER — IOPAMIDOL 755 MG/ML
INJECTION, SOLUTION INTRAVASCULAR
Status: DISCONTINUED | OUTPATIENT
Start: 2019-10-10 | End: 2019-10-10 | Stop reason: HOSPADM

## 2019-10-10 RX ORDER — FENTANYL CITRATE 50 UG/ML
25-50 INJECTION, SOLUTION INTRAMUSCULAR; INTRAVENOUS
Status: ACTIVE | OUTPATIENT
Start: 2019-10-10 | End: 2019-10-10

## 2019-10-10 RX ORDER — DULOXETIN HYDROCHLORIDE 60 MG/1
60 CAPSULE, DELAYED RELEASE ORAL 2 TIMES DAILY
Status: DISCONTINUED | OUTPATIENT
Start: 2019-10-10 | End: 2019-10-11 | Stop reason: HOSPADM

## 2019-10-10 RX ORDER — NITROGLYCERIN 5 MG/ML
VIAL (ML) INTRAVENOUS
Status: DISCONTINUED | OUTPATIENT
Start: 2019-10-10 | End: 2019-10-10 | Stop reason: HOSPADM

## 2019-10-10 RX ORDER — SODIUM CHLORIDE 9 MG/ML
INJECTION, SOLUTION INTRAVENOUS CONTINUOUS
Status: ACTIVE | OUTPATIENT
Start: 2019-10-10 | End: 2019-10-10

## 2019-10-10 RX ORDER — POTASSIUM CHLORIDE 1500 MG/1
20-40 TABLET, EXTENDED RELEASE ORAL
Status: DISCONTINUED | OUTPATIENT
Start: 2019-10-10 | End: 2019-10-11 | Stop reason: HOSPADM

## 2019-10-10 RX ORDER — HYDRALAZINE HYDROCHLORIDE 20 MG/ML
10 INJECTION INTRAMUSCULAR; INTRAVENOUS EVERY 4 HOURS PRN
Status: DISCONTINUED | OUTPATIENT
Start: 2019-10-10 | End: 2019-10-11 | Stop reason: HOSPADM

## 2019-10-10 RX ORDER — METOPROLOL TARTRATE 50 MG
50 TABLET ORAL 2 TIMES DAILY
Status: DISCONTINUED | OUTPATIENT
Start: 2019-10-10 | End: 2019-10-11 | Stop reason: HOSPADM

## 2019-10-10 RX ORDER — ONDANSETRON 2 MG/ML
4 INJECTION INTRAMUSCULAR; INTRAVENOUS ONCE
Status: COMPLETED | OUTPATIENT
Start: 2019-10-10 | End: 2019-10-10

## 2019-10-10 RX ORDER — POTASSIUM CHLORIDE 29.8 MG/ML
20 INJECTION INTRAVENOUS
Status: DISCONTINUED | OUTPATIENT
Start: 2019-10-10 | End: 2019-10-11 | Stop reason: HOSPADM

## 2019-10-10 RX ORDER — NALOXONE HYDROCHLORIDE 0.4 MG/ML
.1-.4 INJECTION, SOLUTION INTRAMUSCULAR; INTRAVENOUS; SUBCUTANEOUS
Status: DISCONTINUED | OUTPATIENT
Start: 2019-10-10 | End: 2019-10-11 | Stop reason: HOSPADM

## 2019-10-10 RX ORDER — ZOLPIDEM TARTRATE 10 MG/1
10 TABLET ORAL AT BEDTIME
COMMUNITY
End: 2019-10-16

## 2019-10-10 RX ORDER — MYCOPHENOLATE MOFETIL 250 MG/1
250 CAPSULE ORAL 2 TIMES DAILY
COMMUNITY
End: 2019-10-16

## 2019-10-10 RX ORDER — TACROLIMUS 0.5 MG/1
0.5 CAPSULE ORAL 2 TIMES DAILY
COMMUNITY
End: 2019-10-16

## 2019-10-10 RX ORDER — FENTANYL CITRATE 50 UG/ML
INJECTION, SOLUTION INTRAMUSCULAR; INTRAVENOUS
Status: DISCONTINUED | OUTPATIENT
Start: 2019-10-10 | End: 2019-10-10 | Stop reason: HOSPADM

## 2019-10-10 RX ORDER — ACETAMINOPHEN 325 MG/1
650 TABLET ORAL EVERY 4 HOURS PRN
Status: DISCONTINUED | OUTPATIENT
Start: 2019-10-10 | End: 2019-10-11 | Stop reason: HOSPADM

## 2019-10-10 RX ORDER — POTASSIUM CL/LIDO/0.9 % NACL 10MEQ/0.1L
10 INTRAVENOUS SOLUTION, PIGGYBACK (ML) INTRAVENOUS
Status: DISCONTINUED | OUTPATIENT
Start: 2019-10-10 | End: 2019-10-11 | Stop reason: HOSPADM

## 2019-10-10 RX ORDER — POTASSIUM CHLORIDE 1.5 G/1.58G
20-40 POWDER, FOR SOLUTION ORAL
Status: DISCONTINUED | OUTPATIENT
Start: 2019-10-10 | End: 2019-10-11 | Stop reason: HOSPADM

## 2019-10-10 RX ORDER — POTASSIUM CHLORIDE 1500 MG/1
40 TABLET, EXTENDED RELEASE ORAL ONCE
Status: COMPLETED | OUTPATIENT
Start: 2019-10-10 | End: 2019-10-10

## 2019-10-10 RX ORDER — NITROGLYCERIN 0.4 MG/1
0.4 TABLET SUBLINGUAL EVERY 5 MIN PRN
Status: DISCONTINUED | OUTPATIENT
Start: 2019-10-10 | End: 2019-10-11 | Stop reason: HOSPADM

## 2019-10-10 RX ORDER — ONDANSETRON 2 MG/ML
4 INJECTION INTRAMUSCULAR; INTRAVENOUS EVERY 6 HOURS PRN
Status: DISCONTINUED | OUTPATIENT
Start: 2019-10-10 | End: 2019-10-11 | Stop reason: HOSPADM

## 2019-10-10 RX ORDER — ATORVASTATIN CALCIUM 40 MG/1
40 TABLET, FILM COATED ORAL DAILY
Status: DISCONTINUED | OUTPATIENT
Start: 2019-10-10 | End: 2019-10-11 | Stop reason: HOSPADM

## 2019-10-10 RX ORDER — OMEPRAZOLE 40 MG/1
40 CAPSULE, DELAYED RELEASE ORAL 2 TIMES DAILY
COMMUNITY
End: 2020-05-04

## 2019-10-10 RX ORDER — FLUMAZENIL 0.1 MG/ML
0.2 INJECTION, SOLUTION INTRAVENOUS
Status: ACTIVE | OUTPATIENT
Start: 2019-10-10 | End: 2019-10-10

## 2019-10-10 RX ORDER — PREDNISONE 5 MG/1
5 TABLET ORAL EVERY MORNING
Status: DISCONTINUED | OUTPATIENT
Start: 2019-10-10 | End: 2019-10-11 | Stop reason: HOSPADM

## 2019-10-10 RX ORDER — LORAZEPAM 2 MG/ML
0.5 INJECTION INTRAMUSCULAR ONCE
Status: COMPLETED | OUTPATIENT
Start: 2019-10-10 | End: 2019-10-10

## 2019-10-10 RX ORDER — METOPROLOL TARTRATE 1 MG/ML
INJECTION, SOLUTION INTRAVENOUS
Status: DISCONTINUED | OUTPATIENT
Start: 2019-10-10 | End: 2019-10-10 | Stop reason: HOSPADM

## 2019-10-10 RX ORDER — METOPROLOL TARTRATE 25 MG/1
25 TABLET, FILM COATED ORAL 2 TIMES DAILY
Status: DISCONTINUED | OUTPATIENT
Start: 2019-10-10 | End: 2019-10-10

## 2019-10-10 RX ORDER — TACROLIMUS 0.5 MG/1
1.5 CAPSULE ORAL 2 TIMES DAILY
Status: DISCONTINUED | OUTPATIENT
Start: 2019-10-10 | End: 2019-10-11 | Stop reason: HOSPADM

## 2019-10-10 RX ORDER — POTASSIUM CHLORIDE 7.45 MG/ML
10 INJECTION INTRAVENOUS
Status: DISCONTINUED | OUTPATIENT
Start: 2019-10-10 | End: 2019-10-11 | Stop reason: HOSPADM

## 2019-10-10 RX ORDER — NALOXONE HYDROCHLORIDE 0.4 MG/ML
.2-.4 INJECTION, SOLUTION INTRAMUSCULAR; INTRAVENOUS; SUBCUTANEOUS
Status: ACTIVE | OUTPATIENT
Start: 2019-10-10 | End: 2019-10-10

## 2019-10-10 RX ORDER — PREDNISONE 5 MG/1
5 TABLET ORAL EVERY MORNING
COMMUNITY

## 2019-10-10 RX ORDER — TACROLIMUS 1 MG/1
1 CAPSULE ORAL 2 TIMES DAILY
COMMUNITY
End: 2019-10-16

## 2019-10-10 RX ORDER — ATROPINE SULFATE 0.1 MG/ML
0.5 INJECTION INTRAVENOUS EVERY 5 MIN PRN
Status: ACTIVE | OUTPATIENT
Start: 2019-10-10 | End: 2019-10-10

## 2019-10-10 RX ORDER — ASPIRIN 81 MG/1
81 TABLET ORAL DAILY
Status: DISCONTINUED | OUTPATIENT
Start: 2019-10-10 | End: 2019-10-11 | Stop reason: HOSPADM

## 2019-10-10 RX ORDER — MORPHINE SULFATE 2 MG/ML
1-2 INJECTION, SOLUTION INTRAMUSCULAR; INTRAVENOUS
Status: DISCONTINUED | OUTPATIENT
Start: 2019-10-10 | End: 2019-10-11 | Stop reason: HOSPADM

## 2019-10-10 RX ORDER — NITROGLYCERIN 0.4 MG/1
TABLET SUBLINGUAL
Status: COMPLETED
Start: 2019-10-10 | End: 2019-10-10

## 2019-10-10 RX ORDER — AMLODIPINE BESYLATE 5 MG/1
5 TABLET ORAL EVERY EVENING
Status: ON HOLD | COMMUNITY
End: 2019-10-11

## 2019-10-10 RX ORDER — MORPHINE SULFATE 2 MG/ML
2 INJECTION, SOLUTION INTRAMUSCULAR; INTRAVENOUS ONCE
Status: COMPLETED | OUTPATIENT
Start: 2019-10-10 | End: 2019-10-10

## 2019-10-10 RX ORDER — MYCOPHENOLATE MOFETIL 250 MG/1
250 CAPSULE ORAL 2 TIMES DAILY
Status: DISCONTINUED | OUTPATIENT
Start: 2019-10-10 | End: 2019-10-11 | Stop reason: HOSPADM

## 2019-10-10 RX ORDER — ONDANSETRON 4 MG/1
4 TABLET, ORALLY DISINTEGRATING ORAL EVERY 6 HOURS PRN
Status: DISCONTINUED | OUTPATIENT
Start: 2019-10-10 | End: 2019-10-11 | Stop reason: HOSPADM

## 2019-10-10 RX ORDER — NITROGLYCERIN 20 MG/100ML
.07-2 INJECTION INTRAVENOUS CONTINUOUS
Status: DISCONTINUED | OUTPATIENT
Start: 2019-10-10 | End: 2019-10-11

## 2019-10-10 RX ORDER — HEPARIN SODIUM 1000 [USP'U]/ML
INJECTION, SOLUTION INTRAVENOUS; SUBCUTANEOUS
Status: DISCONTINUED | OUTPATIENT
Start: 2019-10-10 | End: 2019-10-10 | Stop reason: HOSPADM

## 2019-10-10 RX ORDER — DULOXETIN HYDROCHLORIDE 60 MG/1
60 CAPSULE, DELAYED RELEASE ORAL 2 TIMES DAILY
COMMUNITY
End: 2023-02-16

## 2019-10-10 RX ORDER — TACROLIMUS 0.5 MG/1
0.5 CAPSULE ORAL 2 TIMES DAILY
Status: DISCONTINUED | OUTPATIENT
Start: 2019-10-10 | End: 2019-10-10 | Stop reason: CLARIF

## 2019-10-10 RX ORDER — MORPHINE SULFATE 2 MG/ML
INJECTION, SOLUTION INTRAMUSCULAR; INTRAVENOUS
Status: COMPLETED
Start: 2019-10-10 | End: 2019-10-10

## 2019-10-10 RX ORDER — VERAPAMIL HYDROCHLORIDE 2.5 MG/ML
INJECTION, SOLUTION INTRAVENOUS
Status: DISCONTINUED | OUTPATIENT
Start: 2019-10-10 | End: 2019-10-10 | Stop reason: HOSPADM

## 2019-10-10 RX ADMIN — POTASSIUM CHLORIDE 40 MEQ: 1500 TABLET, EXTENDED RELEASE ORAL at 14:26

## 2019-10-10 RX ADMIN — HEPARIN SODIUM 5000 UNITS: 1000 INJECTION, SOLUTION INTRAVENOUS; SUBCUTANEOUS at 03:04

## 2019-10-10 RX ADMIN — TACROLIMUS 1.5 MG: 0.5 CAPSULE ORAL at 14:25

## 2019-10-10 RX ADMIN — ACETAMINOPHEN 650 MG: 325 TABLET, FILM COATED ORAL at 08:20

## 2019-10-10 RX ADMIN — MORPHINE SULFATE 1 MG: 2 INJECTION, SOLUTION INTRAMUSCULAR; INTRAVENOUS at 20:03

## 2019-10-10 RX ADMIN — NITROGLYCERIN 0.4 MG: 0.4 TABLET SUBLINGUAL at 02:27

## 2019-10-10 RX ADMIN — FENTANYL CITRATE 25 MCG: 50 INJECTION, SOLUTION INTRAMUSCULAR; INTRAVENOUS at 03:18

## 2019-10-10 RX ADMIN — LIDOCAINE HYDROCHLORIDE 2 ML: 10 INJECTION, SOLUTION EPIDURAL; INFILTRATION; INTRACAUDAL; PERINEURAL at 03:00

## 2019-10-10 RX ADMIN — METOPROLOL TARTRATE 25 MG: 25 TABLET ORAL at 07:59

## 2019-10-10 RX ADMIN — ASPIRIN 81 MG: 81 TABLET, DELAYED RELEASE ORAL at 08:00

## 2019-10-10 RX ADMIN — ONDANSETRON 4 MG: 2 INJECTION INTRAMUSCULAR; INTRAVENOUS at 02:34

## 2019-10-10 RX ADMIN — METOPROLOL TARTRATE 50 MG: 50 TABLET ORAL at 20:03

## 2019-10-10 RX ADMIN — VERAPAMIL HYDROCHLORIDE 2.5 MG: 2.5 INJECTION, SOLUTION INTRAVENOUS at 03:04

## 2019-10-10 RX ADMIN — HEPARIN SODIUM 4500 UNITS: 1000 INJECTION, SOLUTION INTRAVENOUS; SUBCUTANEOUS at 02:38

## 2019-10-10 RX ADMIN — FENTANYL CITRATE 25 MCG: 50 INJECTION, SOLUTION INTRAMUSCULAR; INTRAVENOUS at 03:23

## 2019-10-10 RX ADMIN — MIDAZOLAM 1 MG: 1 INJECTION INTRAMUSCULAR; INTRAVENOUS at 02:58

## 2019-10-10 RX ADMIN — LORAZEPAM 0.5 MG: 2 INJECTION INTRAMUSCULAR; INTRAVENOUS at 05:57

## 2019-10-10 RX ADMIN — OMEPRAZOLE 40 MG: 20 CAPSULE, DELAYED RELEASE ORAL at 14:11

## 2019-10-10 RX ADMIN — DULOXETINE HYDROCHLORIDE 60 MG: 60 CAPSULE, DELAYED RELEASE ORAL at 14:11

## 2019-10-10 RX ADMIN — TICAGRELOR 90 MG: 90 TABLET ORAL at 20:03

## 2019-10-10 RX ADMIN — MIDAZOLAM 0.5 MG: 1 INJECTION INTRAMUSCULAR; INTRAVENOUS at 03:05

## 2019-10-10 RX ADMIN — NITROGLYCERIN 0.1 MCG/KG/MIN: 20 INJECTION INTRAVENOUS at 02:35

## 2019-10-10 RX ADMIN — POTASSIUM CHLORIDE 20 MEQ: 1500 TABLET, EXTENDED RELEASE ORAL at 16:31

## 2019-10-10 RX ADMIN — FENTANYL CITRATE 50 MCG: 50 INJECTION, SOLUTION INTRAMUSCULAR; INTRAVENOUS at 02:58

## 2019-10-10 RX ADMIN — MORPHINE SULFATE 2 MG: 2 INJECTION, SOLUTION INTRAMUSCULAR; INTRAVENOUS at 02:34

## 2019-10-10 RX ADMIN — MORPHINE SULFATE 1 MG: 2 INJECTION, SOLUTION INTRAMUSCULAR; INTRAVENOUS at 05:54

## 2019-10-10 RX ADMIN — FENTANYL CITRATE 25 MCG: 50 INJECTION, SOLUTION INTRAMUSCULAR; INTRAVENOUS at 03:05

## 2019-10-10 RX ADMIN — NITROGLYCERIN 200 MCG: 5 INJECTION, SOLUTION INTRAVENOUS at 03:24

## 2019-10-10 RX ADMIN — MYCOPHENOLATE MOFETIL 250 MG: 250 CAPSULE ORAL at 14:22

## 2019-10-10 RX ADMIN — PREDNISONE 5 MG: 5 TABLET ORAL at 14:11

## 2019-10-10 RX ADMIN — POTASSIUM CHLORIDE 40 MEQ: 1500 TABLET, EXTENDED RELEASE ORAL at 07:57

## 2019-10-10 RX ADMIN — TACROLIMUS 1.5 MG: 0.5 CAPSULE ORAL at 20:16

## 2019-10-10 RX ADMIN — MORPHINE SULFATE 1 MG: 2 INJECTION, SOLUTION INTRAMUSCULAR; INTRAVENOUS at 05:18

## 2019-10-10 RX ADMIN — IOPAMIDOL 170 ML: 755 INJECTION, SOLUTION INTRAVENOUS at 03:35

## 2019-10-10 RX ADMIN — MIDAZOLAM 0.5 MG: 1 INJECTION INTRAMUSCULAR; INTRAVENOUS at 03:18

## 2019-10-10 RX ADMIN — ACETAMINOPHEN 650 MG: 325 TABLET, FILM COATED ORAL at 21:57

## 2019-10-10 RX ADMIN — MORPHINE SULFATE 1 MG: 2 INJECTION, SOLUTION INTRAMUSCULAR; INTRAVENOUS at 20:41

## 2019-10-10 RX ADMIN — FENTANYL CITRATE 25 MCG: 50 INJECTION, SOLUTION INTRAMUSCULAR; INTRAVENOUS at 02:54

## 2019-10-10 RX ADMIN — OMEPRAZOLE 40 MG: 20 CAPSULE, DELAYED RELEASE ORAL at 20:03

## 2019-10-10 RX ADMIN — MIDAZOLAM 0.5 MG: 1 INJECTION INTRAMUSCULAR; INTRAVENOUS at 02:54

## 2019-10-10 RX ADMIN — ACETAMINOPHEN 650 MG: 325 TABLET, FILM COATED ORAL at 17:34

## 2019-10-10 RX ADMIN — METOPROLOL TARTRATE 5 MG: 1 INJECTION, SOLUTION INTRAVENOUS at 03:18

## 2019-10-10 RX ADMIN — TICAGRELOR 180 MG: 90 TABLET ORAL at 02:27

## 2019-10-10 RX ADMIN — NITROGLYCERIN 200 MCG: 5 INJECTION, SOLUTION INTRAVENOUS at 03:17

## 2019-10-10 RX ADMIN — NITROGLYCERIN 200 MCG: 5 INJECTION, SOLUTION INTRAVENOUS at 03:28

## 2019-10-10 RX ADMIN — NITROGLYCERIN 200 MCG: 5 INJECTION, SOLUTION INTRAVENOUS at 03:22

## 2019-10-10 RX ADMIN — NITROGLYCERIN 200 MCG: 5 INJECTION, SOLUTION INTRAVENOUS at 03:03

## 2019-10-10 RX ADMIN — LIDOCAINE HYDROCHLORIDE 1 ML: 10 INJECTION, SOLUTION EPIDURAL; INFILTRATION; INTRACAUDAL; PERINEURAL at 02:56

## 2019-10-10 RX ADMIN — MIDAZOLAM 0.5 MG: 1 INJECTION INTRAMUSCULAR; INTRAVENOUS at 03:23

## 2019-10-10 RX ADMIN — MYCOPHENOLATE MOFETIL 250 MG: 250 CAPSULE ORAL at 20:03

## 2019-10-10 RX ADMIN — DULOXETINE HYDROCHLORIDE 60 MG: 60 CAPSULE, DELAYED RELEASE ORAL at 20:03

## 2019-10-10 RX ADMIN — ATORVASTATIN CALCIUM 40 MG: 40 TABLET, FILM COATED ORAL at 08:00

## 2019-10-10 ASSESSMENT — ENCOUNTER SYMPTOMS
NAUSEA: 1
ARTHRALGIAS: 1
VOMITING: 1
SHORTNESS OF BREATH: 1

## 2019-10-10 ASSESSMENT — MIFFLIN-ST. JEOR
SCORE: 1447.38
SCORE: 1409.21

## 2019-10-10 NOTE — PLAN OF CARE
"PLAN: cardiac rehab, ECHO, discharge 10/11.    CNS: A& O X 4, afebrile. Tylenol given X 2 PRN for right wrist pain, 5/10 and ice with good relief.   CVS: SB/SR 50's to 60's. NSVT and PVC's in AM. No periph edema. Denies CP, SOB. Has intermittent tingling in left arm (history of this for couple months), and some \"throat tightness\"/shortness of breath that comes and goes. Pt had similar symptoms on presentation this AM, but which chest pain on admit. No chest pressure, and resolves spontaneously.   RESP: RA, lungs diminished. Declined NRT, smoking hx, quit today.  GI: cardiac diet, eating well.   : voiding in bathroom.   LABS: K replaced, recheck at 2030.   IV: right FA PIV S/L.  MOB: Indep in room.   SKIN: right lower forearm ecchymotic from TR bands, puffy. Ice/tylenol for pain.  "

## 2019-10-10 NOTE — PLAN OF CARE
VSS:   Pt hypertensive, MD aware and declines giving PRN antihypertensive  Neuro/Orientation: A&Ox4  Transfer/ activity: decreased activity post cath lab, A1  LS/O2 sats: diminished  CV/Rhythm: SR with long QT  GI/diet: clear liquids  /Spangler: voiding adequately  Lines/ drains/ IVs/ GTT: PIV SL  Procedures/Test results/Labs:  Angio with stent placement  Arterial sites/Bruit/ CMS /hematoma:  hematoma to R upper forearm  Isolation/Wounds/ Drains/ Dressing:  hemostasis device on patient  Pain/PRN meds: PRN morphine given and 1 time dose of ativan  Behaviors/ sitters/ CIWA:   Edema:  Discharge Plan: 1 to 2 days pending cards work up and cardiac rehab    Other:  Will pass on and continue to monitor

## 2019-10-10 NOTE — Clinical Note
The first balloon was inserted into the left anterior descending and left anterior descending diagonal.Max pressure = 6 luana. Total duration = 20 seconds.     Max pressure = 6 luana. Total duration = 20 seconds.    Balloon reinflated a second time: Max pressure = 6 luana. Total duration = 20 seconds.

## 2019-10-10 NOTE — CONSULTS
Consult Date:  10/10/2019   45 minutes critical care time     PRIMARY CARE PHYSICIAN:  Linda Browning MD      HISTORY OF PRESENT ILLNESS:  Nani Brandt, a 54-year-old woman with hypertension, hypercholesterolemia, a cigarette smoking history, and previous kidney/liver transplantation in 2012 on chronic antirejection medications, was evaluated for an acute anterolateral wall ST segment elevation MI.      For about 2 days, the patient experienced recurrent episodes of substernal chest discomfort radiating to arms.  The discomfort occurred on an intermittent basis until about 8:00 on 10/09/2019.  At that time, the discomfort became persistent.  [facility] were contacted and she was brought to the Emergency Room.  Her ER ECG showed an acute anterolateral wall subepicardial injury pattern compatible with evolving anterolateral wall myocardial infarction.  She was administered, ticagrelor, aspirin, heparin, and sent to the Cath Lab.      The patient knows she is taking tacrolimus, but is otherwise unable to name her medications.      She has otherwise been well before this time.  She smokes cigarettes on a daily basis for many years.  She is , has no children, and is currently not working outside the home.      A 12-point review of systems otherwise was negative.  She has been very stable since her kidney and liver transplantation, which were apparently performed for cancer.  We have no details of these procedures, but she has been cared for at the [facility] and sees them on an annual basis.  She last saw Dr. Browning about 3 weeks ago.      ALLERGIES:  NONE KNOWN.      HABITS:  She does use tobacco.      PHYSICAL EXAMINATION:   GENERAL:  Exam in the Cath Lab demonstrates an anxious, overweight 54-year-old woman who complains of ongoing chest discomfort.   VITAL SIGNS:  Her blood pressure is 170/105.  Her heart rate was 102.   LUNGS:  Clear to percussion and auscultation.   CARDIOVASCULAR:   Shows a normal S1 with a normal S2.  There is an S4.  There is no S3.  There is no murmur, rub or click.  Her pulses are symmetrical in the carotid, radial, brachial, femoral, dorsalis pedis and posterior tibials.   ABDOMEN:  Bowel sounds are present but diminished.  Liver percusses to about 7 cm.  Spleen is not palpable.   NEUROLOGIC:  She moves all 4 extremities.   PSYCHIATRIC:  She displays normal insight and judgment.      LABORATORY DATA:  ECG shows a sinus tachycardia, rate 100, with ST segment elevation of about 5 mm in I, aVL, V2, V3, V4, V5 and V6.  There is reciprocal depression in II, III and aVF.  Her chest x-ray shows normal heart and clear lung fields.      Her potassium is 3.0 with a creatinine of 1.37, GFR is 44.  Her hemoglobin is 15.9 with a white blood cell 13.5.  Her INR 0.87.      ASSESSMENT:  This 54-year-old woman with hypertension, cigarette smoking, dyslipidemia, and chronic kidney disease presents with an acute anterolateral wall ST segment elevation myocardial infarction.      PLAN:  Urgent diagnostic coronary angiography followed by possible revascularization.      We have appreciated the opportunity to see your patient, Dre Paulson.         HANG PUGH MD         Addendum  She underwent urgent CAG via R ulnar artery. The LMCA, CX and dominant RCA had no focal severe stenosis, but diffuse atheromatous changes. The second diagonal branch was proximally occluded. There was a large discrepancy between the size of the proximal vessel ( about 2.25 mm) and distal vessel 1.5 to 20 mm just after the occlusion. . We placed a 2.25 x 20 m everolimus eluting Synergy stent with good results and good distal MIR 3 flow..    Plan  Asa ticagrelor statin  BB ACE TTE           D: 10/10/2019   T: 10/10/2019   MT: JESSIE      Name:     DRE PAULSON   MRN:      [id number]        Account:       [id number]   :      1965           Consult Date:  10/10/2019      Document: [id number]        cc: Linda Browning MD

## 2019-10-10 NOTE — H&P
Cardiology critical care 45 minutes dictated  Assessment : Acute anterolateral wall MI in setting of HTN, dyslipidemia, smoking and old history of renal/hepatic transplant on chronic immunosuppressive agents.   170/105      ECG acute anterolateral subepicardial injury consistent with AL STEMI  CXR clear    Lungs clear  Heart tones regular with S4       We performed urgent angiography via R ulnar. The LMCA, CX and RCA have atheromatous change with no focal severe stenosis. The second diagonal branch was proximally occluded with a large discrepancy between proximal and distal vessel size. We placed a 2.25 x 20 mm everolimus eluting Synergy stent with good angiographic results.     Plan  1 asa 81 daily  2 ticagrelor 90 bid  3 bb  4 statin  5 no smoking  6 TTE    Hospitalist service to see.    Britt

## 2019-10-10 NOTE — H&P
Admitted:     10/10/2019      PRIMARY CARE PHYSICIAN:  None listed.      CHIEF COMPLAINT:  STEMI, status post cardiac catheterization.      HISTORY OF PRESENT ILLNESS:  Ms. Nani Ibrahim is a 54-year-old female with history of liver and renal transplant, anxiety, hypertension, dyslipidemia, who presented to the ED with complaints of worsening chest pain.  She has been having chest pain for the past 2 days, substernal chest discomfort, radiating to bilateral arms.  The pain got worse this evening, and she was brought to ER by EMS.  EKG was noted with anterolateral wall STEMI.  She was emergently taken to Cath Lab and a drug-eluting stent was placed.  Hospitalist was requested to resume care post catheterization.  I saw the patient in CICU.  She is chest pain-free.  Denies any other active complaints at this time, no shortness of breath, no pain in abdomen.  Reports normal bowel and bladder habit.      REVIEW OF SYSTEMS:  A 10-point review of system was done and was negative, apart from those mentioned in the history of present illness.      PAST MEDICAL HISTORY:   1.  History of liver and renal transplant.  She had liver transplant in 1990s and then had a repeat transplant in 2012 for transplant failure.   2.  Anxiety.   3.  Hypertension.   4.  Dyslipidemia.      MEDICATIONS PRIOR TO ADMISSION:    Medications Prior to Admission   Medication Sig Dispense Refill Last Dose     amLODIPine (NORVASC) 5 MG tablet Take 5 mg by mouth every evening   10/9/2019 at Unknown time     DULoxetine (CYMBALTA) 60 MG capsule Take 60 mg by mouth 2 times daily   10/9/2019 at pm     mycophenolate (GENERIC EQUIVALENT) 250 MG capsule Take 250 mg by mouth 2 times daily   10/9/2019 at pm     omeprazole (PRILOSEC) 40 MG DR capsule Take 40 mg by mouth 2 times daily   10/9/2019 at pm     predniSONE (DELTASONE) 5 MG tablet Take 5 mg by mouth every morning   10/9/2019 at Unknown time     tacrolimus (GENERIC EQUIVALENT) 0.5 MG capsule Take 0.5 mg  by mouth 2 times daily With 1 mg to = 1.5mg   10/9/2019 at pm     tacrolimus (GENERIC EQUIVALENT) 1 MG capsule Take 1 mg by mouth 2 times daily With 0.5 mg to = 1.5 mg   10/9/2019 at pm     zolpidem (AMBIEN) 10 MG tablet Take 10 mg by mouth At Bedtime   10/9/2019 at Unknown time         ALLERGIES:  NO KNOWN DRUG ALLERGIES.      SOCIAL HISTORY:  Does report active smoking.      FAMILY HISTORY:  Her mother had MI in the past.      PHYSICAL EXAMINATION:   GENERAL:  The patient is conscious, alert, oriented x 3, lying comfortably in bed in no apparent distress.   VITAL SIGNS:  Temperature 98.7, heart rate of 64, blood pressure 164/106, saturation 97% on room air.   HEENT:  Pupils are equal and reactive to light and accommodation.  Extraocular movements are intact.  Oral mucosa is moist.   NECK:  Supple.  No raised JVD.   RESPIRATORY:  Lung sounds bilaterally clear to auscultation.  No wheezes or crepitation.   CARDIOVASCULAR:  Normal S1-S2, regular rate and rhythm.  No murmur.   ABDOMEN:  Soft, nontender, nondistended.  No guarding, rigidity or rebound tenderness.  Bowel sounds are present.   LOWER EXTREMITIES:  With no edema.   NEUROLOGIC:  No focal neurological deficits noted.  Cranial nerves II-XII grossly intact.   PSYCHIATRIC:  Normal mood and affect.      LABORATORY AND IMAGING:  Reviewed in Epic.  BMP notable for potassium of 3, creatinine 1.37; no prior labs to compare with.  Troponin elevated at 1.549.  CBC with a WBC of 13.5, hemoglobin 15.9, platelet 266.    Chest x-ray reviewed by me shows no acute infiltrate, effusion or pneumothorax.  EKG reviewed by me shows anterolateral wall STEMI with a reciprocal ST depression in inferior leads.      ASSESSMENT AND PLAN:  Ms. Nani Ibrahim is a 54-year-old female with history of liver and renal transplant, anxiety, hypertension, dyslipidemia, who presented to ED with chest pain, noted with a ST elevation myocardial infarction.      1.  Acute anterolateral wall ST  elevation myocardial infarction:    - We will admit as inpatient.  She underwent emergent cardiac catheterization and had drug-eluting stent placed.  Full details of intervention is not available at this time.  Cardiology has started her on aspirin, Lipitor, Lopressor, Brilinta.  We will have cardiac rehab and echocardiogram.  Further management per Cardiology.  Monitor on telemetry.  She is currently chest pain-free.     2.  History of liver and renal transplant:    - We will resume her prior to admission transplant medications when verified by Pharmacy.     3.  Leukocytosis:  Likely secondary to concurrent steroids for her transplant and also likely a stress response.   4.  Renal failure with history of renal transplant:  No recent labs to compare with.  We are trying to obtain Care Everywhere records for her.  Creatinine currently is 1.37.  We will monitor BMP.   5.  Hypokalemia:  We will give her 40 mEq of p.o. potassium and we will repeat BMP.   6.  Deep venous thrombosis prophylaxis:  Mechanical with PCD boots.      CODE STATUS:  FULL CODE.         ESVEN THORPE MD             D: 10/10/2019   T: 10/10/2019   MT: VINNIE      Name:     DRE PAULSON   MRN:      [id number]        Account:      [id number]   :      1965        Admitted:     10/10/2019                   Document: [id number]

## 2019-10-10 NOTE — PROGRESS NOTES
Pt seen and examined. Seen Dr orta earlier in the morning. H/P, labs, vital signs and orders reviewed. Agree with his A/P.  Patient presenting with chest pain and was found to have acute anterolateral ST elevation MI, status post PCI earlier this morning.  Patient is chest pain-free at the moment  Currently on appropriate medication including aspirin, Brilinta, statin, metoprolol  Prior to admission transplant immunosuppressants resumed this morning  Awaiting cardiology evaluation this morning.  Discharge later today or tomorrow morning pending cardiology evaluation.

## 2019-10-10 NOTE — PROGRESS NOTES
10/10/19 1103   Quick Adds   Type of Visit Initial PT Evaluation   Living Environment   Lives With spouse   Living Arrangements house   Home Accessibility stairs to enter home;stairs within home   Transportation Anticipated family or friend will provide   Living Environment Comment Lives with spouse, two story home   Self-Care   Usual Activity Tolerance moderate   Current Activity Tolerance moderate   Regular Exercise No   Equipment Currently Used at Home none   Activity/Exercise/Self-Care Comment Pt reports being independent without use of assistive device, exercises on and off   Functional Level Prior   Ambulation 0-->independent   Transferring 0-->independent   Fall history within last six months no   Which of the above functional risks had a recent onset or change? none   General Information   Onset of Illness/Injury or Date of Surgery - Date 10/10/19   Referring Physician Nitish Ordaz MD   Patient/Family Goals Statement To get better   Pertinent History of Current Problem (include personal factors and/or comorbidities that impact the POC) Pt is 54 year old female adm on 10/10/19 with anterolateral STEMI, emergently to cath lab with stent placed. Pt with PMH including HTN, incr chol, smoking, and kidney and liver transplant in 2012.   Cognitive Status Examination   Orientation orientation to person, place and time   Level of Consciousness alert   Pain Assessment   Patient Currently in Pain No   Posture    Posture Forward head position;Protracted shoulders   Range of Motion (ROM)   ROM Comment B LE ROM WFL   Strength   Strength Comments B LE strength WFL   Bed Mobility   Bed Mobility Comments mod I (not using R UE)   Transfer Skills   Transfer Comments SBA   Gait   Gait Comments SBA   Balance   Balance Comments Able to perform ambulation tasks without assistive device or physical assistance   Sensory Examination   Sensory Perception Comments Denies numbness/tingling   General Therapy Interventions  "  Planned Therapy Interventions risk factor education;home program guidelines;progressive activity/exercise   Clinical Impression   Criteria for Skilled Therapeutic Intervention yes, treatment indicated   PT Diagnosis Impaired activity tolerance   Influenced by the following impairments Decreased endurance   Functional limitations due to impairments Decreased ability to participate in daily tasks   Clinical Presentation Stable/Uncomplicated   Clinical Presentation Rationale Current presentation, PMH   Clinical Decision Making (Complexity) Low complexity   Therapy Frequency 2x/day   Predicted Duration of Therapy Intervention (days/wks) 2 days   Anticipated Discharge Disposition Home with Outpatient Therapy   Risk & Benefits of therapy have been explained Yes   Patient, Family & other staff in agreement with plan of care Yes   [facility] TM \"6 Clicks\"   2016, Trustees of [facility], under license to [employer]  All rights reserved.   6 Clicks Short Forms Basic Mobility Inpatient Short Form   [facility]  \"6 Clicks\" V.2 Basic Mobility Inpatient Short Form   1. Turning from your back to your side while in a flat bed without using bedrails? 4 - None   2. Moving from lying on your back to sitting on the side of a flat bed without using bedrails? 4 - None   3. Moving to and from a bed to a chair (including a wheelchair)? 4 - None   4. Standing up from a chair using your arms (e.g., wheelchair, or bedside chair)? 4 - None   5. To walk in hospital room? 3 - A Little   6. Climbing 3-5 steps with a railing? 3 - A Little   Basic Mobility Raw Score (Score out of 24.Lower scores equate to lower levels of function) 22   Total Evaluation Time   Total Evaluation Time (Minutes) 10     "

## 2019-10-10 NOTE — Clinical Note
The first balloon was inserted into the left anterior descending and left anterior descending diagonal.Max pressure = 11 luana. Total duration = 20 seconds.

## 2019-10-10 NOTE — PLAN OF CARE
Discharge Planner PT   Patient plan for discharge: Home  Current status: Pt is 54 year old female adm on 10/10/19 with AL STEMI, emergently to cath lab with one stent placed. At baseline, pt is independent without use of assistive device, lives with spouse in two story home. PT/CR orders received, chart reviewed, evaluation completed and treatment initiated. Pt is independent with bed mobility, transfers, and ambulation. Pt able to ambulate x5 minutes in hallway with stable vital signs, limited due to feeling lightheaded. Pt denies any chest pain or pressure. Education provided including smoking cessation.    PM Session: Pt able to ambulate 5 minutes in hallway and 7  Minutes on treadmill at speed 1.5mph, no incline, rates activity as 4/10 on OMNI effort scale, did demonstrate increased BP with activity, see vital signs flow sheet.    Barriers to return to prior living situation: None from a mobility standpoint  Recommendations for discharge: Home with phase 2 outpatient cardiac rehab  Rationale for recommendations: Pt is able to complete mobility necessary for discharge to home. Recommend outpatient cardiac rehab to further progress aerobic activity level in monitored setting and for education to promote optimal heart health.       Entered by: Shirley Marroquin 10/10/2019 12:07 PM

## 2019-10-10 NOTE — ED TRIAGE NOTES
Patient herewith  Chest pain on and off since Tuesday.8 pm tonight the pain was more constant  With pain radiating to bilateral shoulder,right arm with nausea and vomiting.She was given 324 ASA and one nitroglycerine by EMS.Patient had liver trans plant in 1990 and in 2012 she had another liver transplant with kidney trandplant

## 2019-10-10 NOTE — ED PROVIDER NOTES
History     Chief Complaint:  Chest Pain     HPI  Nani Ibrahim is a 54 year old female with history of anxiety, hypertension and high cholesterol who presents to the emergency department today with chest pain. The patient presents via EMS who state she has had chest pain that started 2 days ago and was intermittent until last night at 2000, where it has become constant. EMS gave her a nitro which improved her blood pressure from 190s to 130s and also received an 234 mg Aspirin. She has had associated nausea, vomiting, shortness of breath, shoulder pain and right arm pain. She states here in the ED her shoulder pain is sharp and chest pain that is described as heaviness. She states the pain worsens with physical exertion. Her pain is 4/10 here. She endorses intermittent cigarette use. She states she recently traveled to [place] by plane 2 weeks ago. She states she has never had pain like this before. She states no history of diabetes. She states she had a liver transplant in 1990 due to cancer and kidney and liver transplant in 2012 due to previous transplant failure. She states her mother had a MI.     Allergies:  No Known Allergies     Medications:    Multiple antirejection meds as well as hypertension meds that the patient is does not know at this time    Past Medical History:    Lung cancer  Kidney cancer    Past Surgical History:    Liver and kidney transplant    Family History:    Mother - MI    Social History:  The patient was accompanied to the ED alone.  Smoking Status: Current  Marital Status:      Review of Systems   Respiratory: Positive for shortness of breath.    Cardiovascular: Positive for chest pain.   Gastrointestinal: Positive for nausea and vomiting.   Musculoskeletal: Positive for arthralgias.   All other systems reviewed and are negative.     Physical Exam     Patient Vitals for the past 24 hrs:   BP Temp Temp src Pulse Heart Rate Resp Height Weight   10/10/19 0230 (!) 154/105 --  "-- 117 107 29 -- --   10/10/19 0216 (!) 170/105 98.7  F (37.1  C) Oral -- -- -- 1.549 m (5' 1\") 86.2 kg (190 lb)        Physical Exam  General: Patient is alert and anxious and uncomfortable appearing.  HEENT: Head atraumatic    Eyes: pupils equal and reactive. Conjunctiva clear   Nares: patent   Oropharynx: no lesions, uvula midline, no palatal draping, normal voice, no trismus  Neck: Supple without lymphadenopathy, no meningismus  Chest: Tachycardic but regular  Lungs: Equal clear to auscultation with no wheeze or rales  Abdomen: Soft, non tender, nondistended, normal bowel sounds  Back: No costovertebral angle tenderness, no midline C, T or L spine tenderness  Neuro: Grossly nonfocal, normal speech, strength equal bilaterally, CN 2-12 intact  Extremities: No deformities, equal radial and DP pulses. No clubbing, cyanosis.  No edema  Skin: Warm and dry with no rash.       Emergency Department Course     ECG:  Indication: chest pain  Time: 0215  Vent. Rate 102 bpm. ME interval 128. QRS duration 68. QT/QTc 348/453. P-R-T axis 73 -38 1.  Sinus tachycardia. Right atrial enlargement. Left axis deviation. Anteroseptal infarct, age undetermined. Lateral injury pattern. Acute MI / STEMI. Abnormal ECG.   Read time: 0220    Imaging:  Radiology findings were communicated with the patient who voiced understanding of the findings.    XR Chest 1 view, portable:   IMPRESSION: No acute abnormality, as per radiology.    Laboratory:  Laboratory findings were communicated with the patient who voiced understanding of the findings.    0222 Troponin POCT: 1.01 (H)  BMP: Glucose: 104 (H), Potassium: 3.0 (L), Creatinine: 1.37 (H), GFR: 44 (L), o/w WNL  CBC: WBC: 13.5 (H), HGB: 15.9 (H), PLT: 266  0223 Troponin: 1.549  INR: 0.87  PTT: 33    Interventions:  0227 Brilinta 180 mg PO  0227 Nitrostat 0.4 mg sublingual  0234 Zofran 4 mg IV   0234 Morphine 2 mg IV  0235 Nitroglycerin 0.1 mcg/kg/min IV  0238 Heparin 4500 units IV    Emergency " Department Course:  Nursing notes and vitals reviewed. (2:12 AM) I performed an exam of the patient as documented above.     IV inserted. Blood drawn. This was sent to the lab for further testing, results above.     Medicine administered as documented above.    The patient was sent for XR while in the emergency department, results above.     0210 EMS arrival     0214 STEMI    0223 Portable XR    0227 Brilinta 180 mg administered    0227 Nitroglycerin 0.4 mg administered    0233 Morphine 2 mg administered    0234 Zofran 4 mg administered    0235 Nitroglycerin 0.1 mcg/kg/min IV infusion    0239 Heparin dosing    0239 Patient was transfer to cath lab for further care.    Impression & Plan      CMS Diagnoses: The patient has a STEMI     The patient was evaluated at 0212   Patient was transferred  to the cath lab which was activated due to ECG changes.   ASA given by medics or taken today  Medical Decision Making:  Nani Ibrahim is a 54 year old female who presents with chest pain.  Their history and risk factor analysis are significant for hypertension, high cholesterol, smoking, family history.  The workup in the Emergency Department (see above for cardiac enzymes and EKG)  is consistent with a STEMI.    After discussing with patient the risks and benefits of heparinization and given lack of contraindication to this therapy, heparin bolus was started. Plavix and aspirin were given.  Cardiology will initiate heparin drip in cath lab.      There is no clinical, laboratory, or radiographic evidence of pulmonary embolism, AAA, aortic dissection, pneumonia or pneumothorax.     The patient agrees to plan and will discuss with cardiologist further plan including catheterization.   Critical Care time:  was 30 minutes for this patient excluding procedures.    Diagnosis:    ICD-10-CM    1. ST elevation myocardial infarction (STEMI), unspecified artery (H) I21.3 Basic metabolic panel     CBC with platelets differential      Troponin I     INR     Partial thromboplastin time     Troponin POCT     Troponin POCT   2. Hypertensive emergency I16.1       Disposition:  Transferred to cath lab.    Scribe Disclosure:  I, Sarwat Coronado, am serving as a scribe at 2:12 AM on 10/10/2019 to document services personally performed by Latonya Sebastian MD based on my observations and the provider's statements to me.      10/10/2019    EMERGENCY DEPARTMENT       Latonya Sebastian MD  10/10/19 0258

## 2019-10-10 NOTE — ED NOTES
Bed: [facility]  Expected date:   Expected time:   Means of arrival:   Comments:  435 54F STEMI alert activate cathlab eta 5 min

## 2019-10-10 NOTE — CONSULTS
NUTRITION EDUCATION    REASON FOR ASSESSMENT:  Nutrition education on American Heart Association (AHA) Heart Healthy Diet.    NUTRITION HISTORY:  Information obtained from Patient:   The patient states that she received education on the Heart Healthy diet a while back from [facility]. However, she wanted to take the handout home as a refresher. She said she has been trying to follow the diet for a long time now, since 1990. She feels she has become pretty good at limiting her sodium. In addition, the patient stated that she typically eats one meal a day and she typically has chicken or turkey with corn, potatoes, or veggie tots which are similar to tater tots but they are made out of cauliflower or broccoli instead.       CURRENT DIET ORDER:  Low saturated fat, low sodium (<2400mg) diet     NUTRITION DIAGNOSIS:  Food- and nutrition-related knowledge deficit R/t the AHA Heart Healthy Diet as evidenced by patient receiving the education a while ago and wanting to be refreshed on the diet.     INTERVENTIONS:  Nutrition Prescription:    Recommended AHA Heart Healthy Diet    Implementation:     Nutrition Education (Content):  a) reviewed Heart Healthy Diet guidelines  b) provided heart healthy diet handout    Nutrition Education (Application):  a) Discussed current eating habits and recommended alternative food choices    Anticipate good compliance    Diet Education - refer to Education flowsheet    Goals:    Patient verbalizes understanding of diet     All of the above goals met during education session    Follow Up/Monitoring:    Provided RD contact information for future questions    Marina Rosales  Dietetic Intern

## 2019-10-10 NOTE — CONSULTS
Medication coverage check for Brilinta. $24.99 monthly.  Mattie Diaz CphT  [facility] Discharge Pharmacy Liaison  Liaison Cell: [phone]

## 2019-10-10 NOTE — PHARMACY-ADMISSION MEDICATION HISTORY
Admission medication history interview status for the 10/10/2019  admission is complete. See EPIC admission navigator for prior to admission medications     Medication history source reliability:Good    Actions taken by pharmacist (provider contacted, etc): d/w pt, had list on phone     Additional medication history information not noted on PTA med list :None    Medication reconciliation/reorder completed by provider prior to medication history? No    Time spent in this activity: 10 minutes    Prior to Admission medications    Medication Sig Last Dose Taking? Auth Provider   amLODIPine (NORVASC) 5 MG tablet Take 5 mg by mouth every evening 10/9/2019 at Unknown time Yes Unknown, Entered By History   DULoxetine (CYMBALTA) 60 MG capsule Take 60 mg by mouth 2 times daily 10/9/2019 at pm Yes Unknown, Entered By History   mycophenolate (GENERIC EQUIVALENT) 250 MG capsule Take 250 mg by mouth 2 times daily 10/9/2019 at pm Yes Unknown, Entered By History   omeprazole (PRILOSEC) 40 MG DR capsule Take 40 mg by mouth 2 times daily 10/9/2019 at pm Yes Unknown, Entered By History   predniSONE (DELTASONE) 5 MG tablet Take 5 mg by mouth every morning 10/9/2019 at Unknown time Yes Unknown, Entered By History   tacrolimus (GENERIC EQUIVALENT) 0.5 MG capsule Take 0.5 mg by mouth 2 times daily With 1 mg to = 1.5mg 10/9/2019 at pm Yes Unknown, Entered By History   tacrolimus (GENERIC EQUIVALENT) 1 MG capsule Take 1 mg by mouth 2 times daily With 0.5 mg to = 1.5 mg 10/9/2019 at pm Yes Unknown, Entered By History   zolpidem (AMBIEN) 10 MG tablet Take 10 mg by mouth At Bedtime 10/9/2019 at Unknown time Yes Unknown, Entered By History

## 2019-10-10 NOTE — CONSULTS
SW:  D:  Reviewed chart.  Patient has no insurance listed.  Call placed and message left for Tyra in the financial office requesting that she follow up directly with patient.  Referral also made to the discharge pharmacy liaison for discharge medication assistance.  P:  Will continue to follow.    URIEL Mesa, Stephens Memorial HospitalSW  [phone]  [facility]

## 2019-10-11 ENCOUNTER — APPOINTMENT (OUTPATIENT)
Dept: PHYSICAL THERAPY | Facility: CLINIC | Age: 54
End: 2019-10-11
Payer: COMMERCIAL

## 2019-10-11 ENCOUNTER — APPOINTMENT (OUTPATIENT)
Dept: CARDIOLOGY | Facility: CLINIC | Age: 54
End: 2019-10-11
Attending: STUDENT IN AN ORGANIZED HEALTH CARE EDUCATION/TRAINING PROGRAM
Payer: COMMERCIAL

## 2019-10-11 VITALS
HEIGHT: 61 IN | RESPIRATION RATE: 16 BRPM | SYSTOLIC BLOOD PRESSURE: 136 MMHG | WEIGHT: 173.4 LBS | OXYGEN SATURATION: 97 % | HEART RATE: 69 BPM | DIASTOLIC BLOOD PRESSURE: 86 MMHG | TEMPERATURE: 97.8 F | BODY MASS INDEX: 32.74 KG/M2

## 2019-10-11 LAB
ANION GAP SERPL CALCULATED.3IONS-SCNC: <1 MMOL/L (ref 3–14)
BUN SERPL-MCNC: 13 MG/DL (ref 7–30)
CALCIUM SERPL-MCNC: 9.6 MG/DL (ref 8.5–10.1)
CHLORIDE SERPL-SCNC: 108 MMOL/L (ref 94–109)
CO2 SERPL-SCNC: 32 MMOL/L (ref 20–32)
CREAT SERPL-MCNC: 0.99 MG/DL (ref 0.52–1.04)
ERYTHROCYTE [DISTWIDTH] IN BLOOD BY AUTOMATED COUNT: 14.3 % (ref 10–15)
GFR SERPL CREATININE-BSD FRML MDRD: 65 ML/MIN/{1.73_M2}
GLUCOSE SERPL-MCNC: 93 MG/DL (ref 70–99)
HCT VFR BLD AUTO: 43.8 % (ref 35–47)
HGB BLD-MCNC: 14.6 G/DL (ref 11.7–15.7)
INTERPRETATION ECG - MUSE: NORMAL
MCH RBC QN AUTO: 28.2 PG (ref 26.5–33)
MCHC RBC AUTO-ENTMCNC: 33.3 G/DL (ref 31.5–36.5)
MCV RBC AUTO: 85 FL (ref 78–100)
PLATELET # BLD AUTO: 261 10E9/L (ref 150–450)
POTASSIUM SERPL-SCNC: 5 MMOL/L (ref 3.4–5.3)
RBC # BLD AUTO: 5.18 10E12/L (ref 3.8–5.2)
SODIUM SERPL-SCNC: 140 MMOL/L (ref 133–144)
TACROLIMUS BLD-MCNC: 6.7 UG/L (ref 5–15)
TME LAST DOSE: NORMAL H
WBC # BLD AUTO: 8.8 10E9/L (ref 4–11)

## 2019-10-11 PROCEDURE — 25500064 ZZH RX 255 OP 636: Performed by: INTERNAL MEDICINE

## 2019-10-11 PROCEDURE — 99232 SBSQ HOSP IP/OBS MODERATE 35: CPT | Mod: 25 | Performed by: INTERNAL MEDICINE

## 2019-10-11 PROCEDURE — 90682 RIV4 VACC RECOMBINANT DNA IM: CPT | Performed by: HOSPITALIST

## 2019-10-11 PROCEDURE — 80048 BASIC METABOLIC PNL TOTAL CA: CPT | Performed by: STUDENT IN AN ORGANIZED HEALTH CARE EDUCATION/TRAINING PROGRAM

## 2019-10-11 PROCEDURE — 25000132 ZZH RX MED GY IP 250 OP 250 PS 637: Performed by: NURSE PRACTITIONER

## 2019-10-11 PROCEDURE — 25000132 ZZH RX MED GY IP 250 OP 250 PS 637: Performed by: STUDENT IN AN ORGANIZED HEALTH CARE EDUCATION/TRAINING PROGRAM

## 2019-10-11 PROCEDURE — 25000132 ZZH RX MED GY IP 250 OP 250 PS 637: Performed by: INTERNAL MEDICINE

## 2019-10-11 PROCEDURE — 93306 TTE W/DOPPLER COMPLETE: CPT | Mod: 26 | Performed by: INTERNAL MEDICINE

## 2019-10-11 PROCEDURE — 36415 COLL VENOUS BLD VENIPUNCTURE: CPT | Performed by: STUDENT IN AN ORGANIZED HEALTH CARE EDUCATION/TRAINING PROGRAM

## 2019-10-11 PROCEDURE — 85027 COMPLETE CBC AUTOMATED: CPT | Performed by: STUDENT IN AN ORGANIZED HEALTH CARE EDUCATION/TRAINING PROGRAM

## 2019-10-11 PROCEDURE — 80197 ASSAY OF TACROLIMUS: CPT | Performed by: INTERNAL MEDICINE

## 2019-10-11 PROCEDURE — 40000264 ECHOCARDIOGRAM COMPLETE

## 2019-10-11 PROCEDURE — 25000131 ZZH RX MED GY IP 250 OP 636 PS 637: Performed by: INTERNAL MEDICINE

## 2019-10-11 PROCEDURE — 97110 THERAPEUTIC EXERCISES: CPT | Mod: GP

## 2019-10-11 PROCEDURE — 99238 HOSP IP/OBS DSCHRG MGMT 30/<: CPT | Performed by: HOSPITALIST

## 2019-10-11 PROCEDURE — 25000128 H RX IP 250 OP 636: Performed by: HOSPITALIST

## 2019-10-11 RX ORDER — ATORVASTATIN CALCIUM 40 MG/1
40 TABLET, FILM COATED ORAL DAILY
Qty: 30 TABLET | Refills: 0 | Status: SHIPPED | OUTPATIENT
Start: 2019-10-12 | End: 2022-04-14

## 2019-10-11 RX ORDER — NITROGLYCERIN 0.4 MG/1
TABLET SUBLINGUAL
Qty: 20 TABLET | Refills: 0 | Status: SHIPPED | OUTPATIENT
Start: 2019-10-11 | End: 2024-06-04

## 2019-10-11 RX ORDER — METOPROLOL TARTRATE 50 MG
50 TABLET ORAL 2 TIMES DAILY
Qty: 60 TABLET | Refills: 0 | Status: SHIPPED | OUTPATIENT
Start: 2019-10-11 | End: 2019-10-18

## 2019-10-11 RX ADMIN — OMEPRAZOLE 40 MG: 20 CAPSULE, DELAYED RELEASE ORAL at 08:54

## 2019-10-11 RX ADMIN — TACROLIMUS 1.5 MG: 0.5 CAPSULE ORAL at 10:47

## 2019-10-11 RX ADMIN — MYCOPHENOLATE MOFETIL 250 MG: 250 CAPSULE ORAL at 08:55

## 2019-10-11 RX ADMIN — DULOXETINE HYDROCHLORIDE 60 MG: 60 CAPSULE, DELAYED RELEASE ORAL at 08:54

## 2019-10-11 RX ADMIN — HUMAN ALBUMIN MICROSPHERES AND PERFLUTREN 9 ML: 10; .22 INJECTION, SOLUTION INTRAVENOUS at 12:00

## 2019-10-11 RX ADMIN — METOPROLOL TARTRATE 50 MG: 50 TABLET ORAL at 09:29

## 2019-10-11 RX ADMIN — INFLUENZA A VIRUS A/BRISBANE/02/2018 (H1N1) RECOMBINANT HEMAGGLUTININ ANTIGEN, INFLUENZA A VIRUS A/KANSAS/14/2017 (H3N2) RECOMBINANT HEMAGGLUTININ ANTIGEN, INFLUENZA B VIRUS B/PHUKET/3073/2013 RECOMBINANT HEMAGGLUTININ ANTIGEN, AND INFLUENZA B VIRUS B/MARYLAND/15/2016 RECOMBINANT HEMAGGLUTININ ANTIGEN 0.5 ML: 45; 45; 45; 45 INJECTION INTRAMUSCULAR at 13:26

## 2019-10-11 RX ADMIN — TICAGRELOR 90 MG: 90 TABLET ORAL at 08:55

## 2019-10-11 RX ADMIN — ATORVASTATIN CALCIUM 40 MG: 40 TABLET, FILM COATED ORAL at 08:54

## 2019-10-11 RX ADMIN — ASPIRIN 81 MG: 81 TABLET, DELAYED RELEASE ORAL at 08:55

## 2019-10-11 RX ADMIN — PREDNISONE 5 MG: 5 TABLET ORAL at 08:55

## 2019-10-11 ASSESSMENT — MIFFLIN-ST. JEOR: SCORE: 1333.92

## 2019-10-11 NOTE — PROGRESS NOTES
[facility]    Cardiology Progress Note    Date of Service (when I saw the patient): 10/11/2019     Assessment & Plan   Nani Ford is a 54 year old female who was admitted on 10/10/2019 with acute anterolateral STEMI with two days of recurrent CP. Hx of kidney and liver transplants, she follows with [facility] for both of these. On tacrolimus for anti rejections. Smokes 3-4 cigarettes a day for years.    STEMI  -occluded second diagonal stented with a 2.25 x 20 mm PAWAN  -mild nonobstructive disease eleswhere  -Echo pending  -did well overnight and in rehab.   -creat 0.99 this am  -  Plan  ASA and Brillinta uninterrupted for a year, aspiring lifelong  Statin  BB  Avoid nephrotoxic  Cardiac rehab    Hypertension  -lopressor 50 BID    Close cardiology f/u arranged with [facility]. Will check lipids and renal function at that time given contrast and initiation of lipids in pt with kidney and liver transplant. Follow up with Dr. Desouza in December    Interval History   No recurrent chest pain. VSS, bp improved.    Physical Exam   Temp: 97.4  F (36.3  C) Temp src: Oral BP: 136/86 Pulse: 69 Heart Rate: 86 Resp: 16 SpO2: 97 % O2 Device: None (Room air)    Vitals:    10/10/19 0216 10/10/19 0401 10/11/19 0519   Weight: 86.2 kg (190 lb) 90 kg (198 lb 6.6 oz) 78.7 kg (173 lb 6.4 oz)     Vital Signs with Ranges  Temp:  [97.4  F (36.3  C)-98.3  F (36.8  C)] 97.4  F (36.3  C)  Pulse:  [60-69] 69  Heart Rate:  [56-86] 86  Resp:  [12-21] 16  BP: ()/() 136/86  SpO2:  [96 %-99 %] 97 %  I/O last 3 completed shifts:  In: 1320 [P.O.:1020; I.V.:300]  Out: 1150 [Urine:1150]    Constitutional     alert and oriented, in no acute distress.     Skin     warm and dry to touch    ENT     no pallor or cyanosis    Neck    Supple, JVP normal, no carotid bruit    Chest     no tenderness to palpation    Lungs  clear to auscultation     Cardiac  regular rhythm, S1 normal, S2 normal, No S3 or S4, no murmurs, no  rubs    Abdomen     abdomen soft, bowel sounds normoactive, no hepatosplenomegaly    Extremities and Back     No edema observed.  Right radial access site mildly swollen/echymotic      Neurological     no gross motor deficits noted, affect appropriate, oriented to time, person and place.    Medications     Percutaneous Coronary Intervention orders placed (this is information for BPA alerting)       ACE/ARB/ARNI NOT PRESCRIBED         aspirin  81 mg Oral Daily     atorvastatin  40 mg Oral Daily     DULoxetine  60 mg Oral BID     influenza vaccine adult (product based on age)  0.5 mL Intramuscular Prior to discharge     metoprolol tartrate  50 mg Oral BID     mycophenolate  250 mg Oral BID     omeprazole  40 mg Oral BID     predniSONE  5 mg Oral QAM     tacrolimus  1.5 mg Oral BID     ticagrelor  90 mg Oral Q12H       Data   Results for orders placed or performed during the hospital encounter of 10/10/19 (from the past 24 hour(s))   Potassium   Result Value Ref Range    Potassium 5.0 3.4 - 5.3 mmol/L   Basic metabolic panel   Result Value Ref Range    Sodium 140 133 - 144 mmol/L    Potassium 5.0 3.4 - 5.3 mmol/L    Chloride 108 94 - 109 mmol/L    Carbon Dioxide 32 20 - 32 mmol/L    Anion Gap <1 (L) 3 - 14 mmol/L    Glucose 93 70 - 99 mg/dL    Urea Nitrogen 13 7 - 30 mg/dL    Creatinine 0.99 0.52 - 1.04 mg/dL    GFR Estimate 65 >60 mL/min/[1.73_m2]    GFR Estimate If Black 76 >60 mL/min/[1.73_m2]    Calcium 9.6 8.5 - 10.1 mg/dL   CBC with platelets   Result Value Ref Range    WBC 8.8 4.0 - 11.0 10e9/L    RBC Count 5.18 3.8 - 5.2 10e12/L    Hemoglobin 14.6 11.7 - 15.7 g/dL    Hematocrit 43.8 35.0 - 47.0 %    MCV 85 78 - 100 fl    MCH 28.2 26.5 - 33.0 pg    MCHC 33.3 31.5 - 36.5 g/dL    RDW 14.3 10.0 - 15.0 %    Platelet Count 261 150 - 450 10e9/L       Karely Marie, HOLDEN, CNP  Cardiology  Pager:  [phone]

## 2019-10-11 NOTE — PLAN OF CARE
VSS on RA.  Tele: SR.  R wrist site ecchymotic but unchanged.  Pt discharged to home with .  AVS explained to pt and all questions answered with pt and spouse.  Medications given to pt.  Restrictions discussed with pt.

## 2019-10-11 NOTE — PLAN OF CARE
Discharge Planner PT   Patient plan for discharge: Home  Current status: Pt independent with bed mobility, transfers, and ambulation. Pt ambulated x11 minutes on treadmill at speed 2.0mph, rates activity as 6/10 on OMNI effort scale. Pt reports SOB and fatigue with treadmill ambulation.   Barriers to return to prior living situation: None from a mobility standpoint  Recommendations for discharge: Home with outpatient cardiac rehab  Rationale for recommendations: Pt is able to complete mobility necessary for discharge to home, recommend outpatient cardiac rehab to further progress aerobic activity tolerance in monitored setting and for education to promote overall heart health.       Entered by: Shirley Marroquin 10/11/2019 1:00 PM      Cardiac Rehab Discharge Summary    Reason for therapy discharge:    Discharged to home with outpatient therapy.    Progress towards therapy goal(s). See goals on Care Plan in [facility] electronic health record for goal details.  Goals partially met.  Barriers to achieving goals:   discharge from facility.    Therapy recommendation(s):    Continued therapy is recommended.  Rationale/Recommendations:  outpatient cardiac rehab to further progress aerobic activity level in monitored setting and education to promote optimal heart health.

## 2019-10-11 NOTE — PLAN OF CARE
Patient alert, steady with ind ambulation. No CP or SOB. Pain in right radial wrist 7-8/10 at beginning of shift, needed morphine to supplement tylenol. CMS, pulse wnl. Site edematous and ecchymotic, no change. Pain improved this am. Hypertensive but improved this am. Tele SR. Echo this am, cardiac rehab, possible discharge to home today.

## 2019-10-11 NOTE — DISCHARGE INSTRUCTIONS
Cardiac Angiogram Discharge Instructions - Radial    After you go home:      Have an adult stay with you until tomorrow.    Drink extra fluids for 2 days.    You may resume your normal diet.    No smoking       For 24 hours - due to the sedation you received:    Relax and take it easy.    Do NOT make any important or legal decisions.    Do NOT drive or operate machines at home or at work.    Do NOT drink alcohol.    Care of Wrist Puncture Site:      For the first 24 hrs - check the puncture site every 1-2 hours while awake.    It is normal to have soreness at the puncture site and mild tingling in your hand for up to 3 days.    Remove the bandaid after 24 hours. If there is minor oozing, apply another bandaid and remove it after 12 hours.    You may shower tomorrow.  Do NOT take a bath, or use a hot tub or pool for at least 3 days. Do NOT scrub the site. Do not use lotion or powder near the puncture site.           Activity:        For 2 days:     do not use your hand or arm to support your weight (such as rising from a chair)     do not bend your wrist (such as lifting a garage door).    do not lift more than 5 pounds or exercise your arm (such as tennis, golf or bowling).    Do NOT do any heavy activity such as exercise, lifting, or straining.     Bleeding:      If you start bleeding from the site in your wrist, sit down and press firmly on/above the site for 10 minutes.     Once bleeding stops, keep arm still for 2 hours.     Call [facility] as soon as you can.       Call 911 right away if you have heavy bleeding or bleeding that does not stop.      Medicines:      If you are taking an antiplatelet medication such as Plavix, Brilinta or Effient, do not stop taking it until you talk to your cardiologist.        If you are on Metformin (Glucophage), do not restart it until you have blood tests (within 2 to 3 days after discharge).  After you have your blood drawn, you may restart the Metformin.     Take your  medications, including blood thinners, unless your provider tells you not to.  If you take Coumadin (Warfarin), have your INR checked by your provider in  3-5 days. Call your clinic to schedule this.    If you have stopped any medicines, check with your provider about when to restart them.    Follow Up Appointments:      Follow up with [facility] Nurse Practitioner at [facility] of patient preference in 7-10 days.    Call the clinic if:      You have a large or growing hard lump around the site.    The site is red, swollen, hot or tender.    Blood or fluid is draining from the site.    You have chills or a fever greater than 101 F (38 C).    Your arm feels numb, cool or changes color.    You have hives, a rash or unusual itching.    Any questions or concerns.          [facility] [place]:    [phone] [facility] (7 days a week)

## 2019-10-11 NOTE — DISCHARGE SUMMARY
[facility]  Hospitalist Discharge Summary       Date of Admission:  10/10/2019  Date of Discharge:  10/11/2019  Discharging Provider: Jo Collins MD      Discharge Diagnoses     Acute anterolateral STEMI    Hypokalemia    H/o liver and renal transplants    Follow-ups Needed After Discharge   Follow-up Appointments     Follow-up and recommended labs and tests       Follow up with primary care provider, Physician No Ref-Primary, within 7   days to evaluate medication change and for hospital follow- up.               Unresulted Labs Ordered in the Past 30 Days of this Admission     Date and Time Order Name Status Description    10/11/2019 0000 Tacrolimus level In process       These results will be followed up by PCP    Discharge Disposition   Discharged to home  Condition at discharge: Stable    Hospital Course   Ms. Nani Ibrahim is a 54-year-old female with history of liver and renal transplant, anxiety, hypertension, dyslipidemia, who presented to ED with chest pain, noted an STEMI.      Acute anterolateral STEMI:   Presented with chest pain, noted acute anterolateral STEMI, underwent emergent cardiac catheterization and a PAWAN was placed to occluded second diagonal, mild non obstructive disease elsewhere. ECHO showed mildly reduced LVEF of 40-45% and apical, anterior, distal anterolateral, anteroseptal akinesis.    -On ASA and Brillinta post procedure. Remained chest pain free. 1 year of DAPT followed by only ASA there after lofelong. Remained chest pain free after angiogram.    -Statin and metoprolol as well started post procedure. BP was soft, and so her PTA amlodipine discontinued.     2.  History of liver and renal transplant: PTA immunosuppressants including prednisone, mycophenolate and tacrolimus resumed.     3.  Leukocytosis:  Likely secondary to concurrent steroids for her transplant and also likely a stress response.     4.  Renal failure with history of renal transplant:  Creatinine 1.37 at presentation, was 0.99 at discharge     5.  Hypokalemia:  replaced.      Consultations This Hospital Stay   NUTRITION SERVICES ADULT IP CONSULT  CARDIAC REHAB IP CONSULT  PHARMACY IP CONSULT  PHARMACY IP CONSULT  CARDIOLOGY IP CONSULT  CARE TRANSITION RN/SW IP CONSULT  PHARMACY LIAISON FOR MEDICATION COVERAGE CONSULT  SMOKING CESSATION PROGRAM IP CONSULT    Code Status   No Order    Time Spent on this Encounter   I, Jo Collins MD, personally saw the patient today and spent less than or equal to 30 minutes discharging this patient.       Jo Collins MD  [facility]  ______________________________________________________________________    Physical Exam   Vital Signs: Temp: 97.4  F (36.3  C) Temp src: Oral BP: 136/86 Pulse: 69 Heart Rate: 86 Resp: 16 SpO2: 97 % O2 Device: None (Room air)    Weight: 173 lbs 6.4 oz     General: AAOx3, appears comfortable.  HEENT: PERRLA EOMI. Mucosa moist.   Lungs: Bilateral equal air entry. Clear to auscultation, normal work of breathing.   CVS: S1S2 regular, no tachycardia or murmur.   Abdomen: Soft, NT, ND. BS heard.  MSK: No edema or deformities.  Neuro: AAOX3. CN 2-12 normal. Strength symmetrical.  Skin: No rash.        Primary Care Physician   Physician No Ref-Primary    Discharge Orders      Lipid Profile     ALT    Last Lab Result: No results found for: ALT     AST     Basic metabolic panel     CARDIAC REHAB REFERRAL      Reason for your hospital stay    STEMI (heart attack)     Follow-up and recommended labs and tests     Follow up with primary care provider, Physician No Ref-Primary, within 7 days to evaluate medication change and for hospital follow- up.     Activity    Your activity upon discharge: activity as tolerated.     Diet    Follow this diet upon discharge: Orders Placed This Encounter      Low Saturated Fat Na <2400 mg       Significant Results and Procedures   Most Recent 3 CBC's:  Recent Labs   Lab Test  10/11/19  0522 10/10/19  0223   WBC 8.8 13.5*   HGB 14.6 15.9*   MCV 85 82    266     Most Recent 3 BMP's:  Recent Labs   Lab Test 10/11/19  0522 10/10/19  2030 10/10/19  0814 10/10/19  0223     --  135 133   POTASSIUM 5.0 5.0 3.0* 3.0*   CHLORIDE 108  --  104 99   CO2 32  --  24 24   BUN 13  --  17 23   CR 0.99  --  0.96 1.37*   ANIONGAP <1*  --  7 10   LANDEN 9.6  --  8.8 9.9   GLC 93  --  111* 104*     Most Recent 2 LFT's:No lab results found.  Most Recent 3 INR's:  Recent Labs   Lab Test 10/10/19  0223   INR 0.87   ,   Results for orders placed or performed during the hospital encounter of 10/10/19   XR Chest Port 1 View    Narrative    XR CHEST PORT 1 VW  10/10/2019 2:31 AM     HISTORY: Chest pain.    COMPARISON: None.    FINDINGS: Upright portable chest. The heart size is normal. The  thoracic aorta is calcified. The lungs are clear. No pneumothorax.      Impression    IMPRESSION: No acute abnormality.    DIVYA WITT MD       Discharge Medications   Current Discharge Medication List      START taking these medications    Details   aspirin (ASA) 81 MG EC tablet Take 1 tablet (81 mg) by mouth daily  Qty: 30 tablet, Refills: 11    Associated Diagnoses: ST elevation myocardial infarction (STEMI), unspecified artery (H)      atorvastatin (LIPITOR) 40 MG tablet Take 1 tablet (40 mg) by mouth daily  Qty: 30 tablet, Refills: 0    Associated Diagnoses: ST elevation myocardial infarction (STEMI), unspecified artery (H)      metoprolol tartrate (LOPRESSOR) 50 MG tablet Take 1 tablet (50 mg) by mouth 2 times daily  Qty: 60 tablet, Refills: 0    Associated Diagnoses: ST elevation myocardial infarction (STEMI), unspecified artery (H)      nitroGLYcerin (NITROSTAT) 0.4 MG sublingual tablet For chest pain place 1 tablet under the tongue every 5 minutes for 3 doses. If symptoms persist 5 minutes after 1st dose call 911.  Qty: 20 tablet, Refills: 0    Associated Diagnoses: ST elevation myocardial infarction  (STEMI), unspecified artery (H)      ticagrelor (BRILINTA) 90 MG tablet Take 1 tablet (90 mg) by mouth every 12 hours  Qty: 60 tablet, Refills: 11    Associated Diagnoses: ST elevation myocardial infarction (STEMI), unspecified artery (H)         CONTINUE these medications which have NOT CHANGED    Details   DULoxetine (CYMBALTA) 60 MG capsule Take 60 mg by mouth 2 times daily      mycophenolate (GENERIC EQUIVALENT) 250 MG capsule Take 250 mg by mouth 2 times daily      omeprazole (PRILOSEC) 40 MG DR capsule Take 40 mg by mouth 2 times daily      predniSONE (DELTASONE) 5 MG tablet Take 5 mg by mouth every morning      tacrolimus (GENERIC EQUIVALENT) 0.5 MG capsule Take 0.5 mg by mouth 2 times daily With 1 mg to = 1.5mg      tacrolimus (GENERIC EQUIVALENT) 1 MG capsule Take 1 mg by mouth 2 times daily With 0.5 mg to = 1.5 mg      zolpidem (AMBIEN) 10 MG tablet Take 10 mg by mouth At Bedtime         STOP taking these medications       amLODIPine (NORVASC) 5 MG tablet Comments:   Reason for Stopping:             Allergies   No Known Allergies

## 2019-10-13 DIAGNOSIS — G47.01 INSOMNIA DUE TO MEDICAL CONDITION: ICD-10-CM

## 2019-10-13 RX ORDER — ZOLPIDEM TARTRATE 10 MG/1
TABLET ORAL
Qty: 30 TABLET | Refills: 0 | Status: SHIPPED | OUTPATIENT
Start: 2019-10-13 | End: 2019-11-17

## 2019-10-15 ENCOUNTER — TELEPHONE (OUTPATIENT)
Dept: CARDIOLOGY | Facility: CLINIC | Age: 54
End: 2019-10-15

## 2019-10-15 NOTE — TELEPHONE ENCOUNTER
Patient was evaluated by cardiology while inpatient for chest pain and STEMI. PMH of liver and kidney transplants. 10/10/19: Coronary angiogram via RRA showed single vessel obstructive coronary artery disease of the second diagonal. Mild nonobstructive disease elsewhere. Successful PCI of the D2 with a [id number] Synergy drug eluting stent. Writer attempted to call patient to discuss any post hospital d/c questions, review medication changes, and confirm f/u appts, but no answer. VM left to return my phone call. RN will confirm with patient that she was d/c with the antiplatelet Brilinta. Pt has an Rx for PRN SL Nitroglycerin. RN will confirm with patient that she has an apt scheduled on 10/18/19 with CAIT Romero Parra with labs prior. Cardiac rehab is scheduled on 10/17/19. CARIDAD Aldana RN.

## 2019-10-15 NOTE — TELEPHONE ENCOUNTER
Writer returned pt's phone message and she denies any chest pain or lightheadedness. Does c/o possible sl SOB at rest and to right side of neck, but states is tolerable and not increasing. Explained to pt could be side effect of Brilinta and if becomes intolerable call writer back and/or discuss during cardiology f/u OV as below. If develops chest pain or increased SOB not relived with PRN SL NTG, go to ED. RRA access site is healing without signs of infection, swelling, drainage. Denies fever. States has adequate supply of Brilinta and denies any medication questions. Reviewed f/u OV's as below and she verbalized understanding without further questions. CARIDAD Aldana RN.

## 2019-10-16 ENCOUNTER — OFFICE VISIT (OUTPATIENT)
Dept: FAMILY MEDICINE | Facility: CLINIC | Age: 54
End: 2019-10-16

## 2019-10-16 VITALS
HEART RATE: 57 BPM | OXYGEN SATURATION: 99 % | DIASTOLIC BLOOD PRESSURE: 64 MMHG | WEIGHT: 177 LBS | BODY MASS INDEX: 33.44 KG/M2 | SYSTOLIC BLOOD PRESSURE: 124 MMHG

## 2019-10-16 DIAGNOSIS — I21.29 ST ELEVATION MYOCARDIAL INFARCTION (STEMI) INVOLVING OTHER CORONARY ARTERY (H): ICD-10-CM

## 2019-10-16 DIAGNOSIS — R20.2 PARESTHESIA OF UPPER LIMB: ICD-10-CM

## 2019-10-16 DIAGNOSIS — G47.01 INSOMNIA DUE TO MEDICAL CONDITION: Primary | ICD-10-CM

## 2019-10-16 PROCEDURE — 99213 OFFICE O/P EST LOW 20 MIN: CPT | Performed by: FAMILY MEDICINE

## 2019-10-16 NOTE — PROGRESS NOTES
[place] is here today in follow up for recent STEMI.   Had stenting   On brilinta now for at least one year.   Feeling ok ubt still short of breath and having L arm numbness and tingling.  R handed   Some neck pain and crunching with turning   Starting cardiac rehab tomorrow   Having trouble sleeping in spite of ambien   Falls asleep ok but wakes up    ROS otherwise negative including sleep, neuro, CV, skin or GI     /64 (BP Location: Right arm, Patient Position: Sitting, Cuff Size: Adult Regular)   Pulse 57   Wt 80.3 kg (177 lb)   SpO2 99%   BMI 33.44 kg/m      L arm with normal strength and DTRs  R forearm with bruising and swelling, tender    ASSESSMENT:  1. Insomnia due to medical condition  otc melatonin discussed   See back in one monht    2. ST elevation myocardial infarction (STEMI) involving other coronary artery (H)  Go through rehab for sob       3. Paresthesia of upper limb  dicussed EMG   Declined for now

## 2019-10-17 ENCOUNTER — TELEPHONE (OUTPATIENT)
Dept: CARDIAC REHAB | Facility: CLINIC | Age: 54
End: 2019-10-17

## 2019-10-17 ENCOUNTER — HOSPITAL ENCOUNTER (OUTPATIENT)
Dept: CARDIAC REHAB | Facility: CLINIC | Age: 54
End: 2019-10-17
Attending: STUDENT IN AN ORGANIZED HEALTH CARE EDUCATION/TRAINING PROGRAM
Payer: COMMERCIAL

## 2019-10-17 DIAGNOSIS — I21.3 ST ELEVATION MYOCARDIAL INFARCTION (STEMI), UNSPECIFIED ARTERY (H): ICD-10-CM

## 2019-10-17 PROCEDURE — 93798 PHYS/QHP OP CAR RHAB W/ECG: CPT | Performed by: OCCUPATIONAL THERAPIST

## 2019-10-17 PROCEDURE — 40000575 ZZH STATISTIC OP CARDIAC VISIT #2: Performed by: OCCUPATIONAL THERAPIST

## 2019-10-17 PROCEDURE — 93797 PHYS/QHP OP CAR RHAB WO ECG: CPT | Mod: 59 | Performed by: OCCUPATIONAL THERAPIST

## 2019-10-17 PROCEDURE — 40000116 ZZH STATISTIC OP CR VISIT: Performed by: OCCUPATIONAL THERAPIST

## 2019-10-17 NOTE — TELEPHONE ENCOUNTER
Attempted to contact patient to discuss report, left a message for patient to seek ER assessment if return of symptoms or needs to use 3 NTG without relief. Left Team 2 RN phone # for patient to call if any concerns prior to OV tomorrow at 10:30.

## 2019-10-17 NOTE — TELEPHONE ENCOUNTER
Patient noted episodes of chest discomfort since hospitalization without clear pattern. Today, patient notes 3/10 chest pressure with 6MWT which resolved with rest. Per angio, no other significant blockages besides treated D2. Also of note, patient noted to be in sinus elvie with resting HR of 41. Resting /66. See cardiac rehab session note. Patient to see Aybike tomorrow, 10/18/19.

## 2019-10-18 ENCOUNTER — DOCUMENTATION ONLY (OUTPATIENT)
Dept: CARDIOLOGY | Facility: CLINIC | Age: 54
End: 2019-10-18

## 2019-10-18 ENCOUNTER — OFFICE VISIT (OUTPATIENT)
Dept: CARDIOLOGY | Facility: CLINIC | Age: 54
End: 2019-10-18
Payer: COMMERCIAL

## 2019-10-18 ENCOUNTER — OFFICE VISIT (OUTPATIENT)
Dept: FAMILY MEDICINE | Facility: CLINIC | Age: 54
End: 2019-10-18

## 2019-10-18 VITALS
DIASTOLIC BLOOD PRESSURE: 81 MMHG | HEIGHT: 61 IN | HEART RATE: 46 BPM | OXYGEN SATURATION: 93 % | WEIGHT: 178.5 LBS | BODY MASS INDEX: 33.7 KG/M2 | SYSTOLIC BLOOD PRESSURE: 149 MMHG

## 2019-10-18 VITALS
DIASTOLIC BLOOD PRESSURE: 78 MMHG | SYSTOLIC BLOOD PRESSURE: 126 MMHG | HEART RATE: 90 BPM | RESPIRATION RATE: 16 BRPM | BODY MASS INDEX: 33.67 KG/M2 | WEIGHT: 178.2 LBS

## 2019-10-18 DIAGNOSIS — I25.10 CORONARY ARTERY DISEASE INVOLVING NATIVE CORONARY ARTERY OF NATIVE HEART WITHOUT ANGINA PECTORIS: Primary | ICD-10-CM

## 2019-10-18 DIAGNOSIS — I21.29 ST ELEVATION MYOCARDIAL INFARCTION (STEMI) INVOLVING OTHER CORONARY ARTERY (H): ICD-10-CM

## 2019-10-18 DIAGNOSIS — I25.5 ISCHEMIC CARDIOMYOPATHY: ICD-10-CM

## 2019-10-18 DIAGNOSIS — R07.89 ATYPICAL CHEST PAIN: ICD-10-CM

## 2019-10-18 DIAGNOSIS — I25.118 CORONARY ARTERY DISEASE OF NATIVE HEART WITH STABLE ANGINA PECTORIS, UNSPECIFIED VESSEL OR LESION TYPE (H): Primary | ICD-10-CM

## 2019-10-18 DIAGNOSIS — F41.1 GAD (GENERALIZED ANXIETY DISORDER): Primary | ICD-10-CM

## 2019-10-18 DIAGNOSIS — E78.5 HYPERLIPIDEMIA LDL GOAL <70: ICD-10-CM

## 2019-10-18 DIAGNOSIS — I25.10 CORONARY ARTERY DISEASE INVOLVING NATIVE HEART WITHOUT ANGINA PECTORIS, UNSPECIFIED VESSEL OR LESION TYPE: ICD-10-CM

## 2019-10-18 DIAGNOSIS — R51.9 NONINTRACTABLE HEADACHE, UNSPECIFIED CHRONICITY PATTERN, UNSPECIFIED HEADACHE TYPE: ICD-10-CM

## 2019-10-18 LAB
ALT SERPL W P-5'-P-CCNC: <5 U/L (ref 5–30)
ANION GAP SERPL CALCULATED.3IONS-SCNC: 13.1 MMOL/L (ref 6–17)
AST SERPL W P-5'-P-CCNC: 12 U/L (ref 0–45)
BUN SERPL-MCNC: 36 MG/DL (ref 7–30)
CALCIUM SERPL-MCNC: 9.6 MG/DL (ref 8.5–10.5)
CHLORIDE SERPL-SCNC: 106 MMOL/L (ref 98–107)
CHOLEST SERPL-MCNC: 142 MG/DL
CO2 SERPL-SCNC: 25 MMOL/L (ref 23–29)
CREAT SERPL-MCNC: 1.36 MG/DL (ref 0.7–1.3)
GFR SERPL CREATININE-BSD FRML MDRD: 41 ML/MIN/{1.73_M2}
GLUCOSE SERPL-MCNC: 112 MG/DL (ref 70–105)
HDLC SERPL-MCNC: 45 MG/DL
LDLC SERPL CALC-MCNC: 70 MG/DL
NONHDLC SERPL-MCNC: 97 MG/DL
POTASSIUM SERPL-SCNC: 4.1 MMOL/L (ref 3.5–5.1)
SODIUM SERPL-SCNC: 140 MMOL/L (ref 136–145)
TRIGL SERPL-MCNC: 133 MG/DL

## 2019-10-18 PROCEDURE — 84460 ALANINE AMINO (ALT) (SGPT): CPT | Performed by: NURSE PRACTITIONER

## 2019-10-18 PROCEDURE — 99215 OFFICE O/P EST HI 40 MIN: CPT | Mod: 25 | Performed by: PHYSICIAN ASSISTANT

## 2019-10-18 PROCEDURE — 80061 LIPID PANEL: CPT | Performed by: NURSE PRACTITIONER

## 2019-10-18 PROCEDURE — 84450 TRANSFERASE (AST) (SGOT): CPT | Performed by: NURSE PRACTITIONER

## 2019-10-18 PROCEDURE — 80048 BASIC METABOLIC PNL TOTAL CA: CPT | Performed by: NURSE PRACTITIONER

## 2019-10-18 PROCEDURE — 36415 COLL VENOUS BLD VENIPUNCTURE: CPT | Performed by: NURSE PRACTITIONER

## 2019-10-18 PROCEDURE — 93000 ELECTROCARDIOGRAM COMPLETE: CPT | Performed by: PHYSICIAN ASSISTANT

## 2019-10-18 PROCEDURE — 99214 OFFICE O/P EST MOD 30 MIN: CPT | Performed by: FAMILY MEDICINE

## 2019-10-18 PROCEDURE — 86140 C-REACTIVE PROTEIN: CPT | Performed by: NURSE PRACTITIONER

## 2019-10-18 RX ORDER — BUTALBITAL, ACETAMINOPHEN AND CAFFEINE 50; 325; 40 MG/1; MG/1; MG/1
TABLET ORAL
Qty: 30 TABLET | Refills: 3 | Status: SHIPPED | OUTPATIENT
Start: 2019-10-18 | End: 2019-12-03

## 2019-10-18 RX ORDER — METOPROLOL SUCCINATE 50 MG/1
50 TABLET, EXTENDED RELEASE ORAL DAILY
Qty: 30 TABLET | Refills: 3 | Status: SHIPPED | OUTPATIENT
Start: 2019-10-18 | End: 2020-02-14

## 2019-10-18 RX ORDER — BUSPIRONE HYDROCHLORIDE 5 MG/1
5 TABLET ORAL 3 TIMES DAILY PRN
Qty: 60 TABLET | Refills: 1 | Status: SHIPPED | OUTPATIENT
Start: 2019-10-18 | End: 2020-05-04

## 2019-10-18 RX ORDER — CLOPIDOGREL BISULFATE 75 MG/1
TABLET ORAL
Qty: 90 TABLET | Refills: 3 | Status: SHIPPED | OUTPATIENT
Start: 2019-10-18 | End: 2020-10-05

## 2019-10-18 ASSESSMENT — MIFFLIN-ST. JEOR: SCORE: 1357.05

## 2019-10-18 NOTE — PROGRESS NOTES
Please see my clinic note from today. I staffed with Dr. Bradley. Plan is to repeat limited echo, ESR, CRP, and BMP next week. We should also switch to Plavix (300 mg loading dose) as I think this will help with SOB. Try nitroglycerin for chest discomfort. She should have follow up in 1-2 week with Ayse ( I am gone and patient will establish care with Dr. Desouza).     Thanks,     Romero

## 2019-10-18 NOTE — PATIENT INSTRUCTIONS
Today's Plan:   1) Stop current metoprolol prescription and switch to Toprol one tablet (50 mg) once a day. This should raise your heart rate.   2) Start amlodipine - take one tablet (5 mg) once a day.   3) I will talk with cardiologist about chest discomfort and shortness of breath. Brilinta may be causing the shortness of breath. Chest discomfort could be from inflammation around the heart. Call us if it gets worse.   4) Kidney function is a bit low today, we will check this again in 2 weeks.       If you have questions or concerns please call my nurse team at [phone].     Scheduling phone number: [phone]  Reminder: Please bring in all current medications, over the counter supplements and vitamin bottles to your next appointment.    It was a pleasure seeing you today!     Romero Sapp PA-C  10/18/2019

## 2019-10-18 NOTE — PROGRESS NOTES
Primary Cardiologist: Dr. Desouza    Reason for Visit: Hospital Follow up with BMP, Lipid/ALT, and ECG beforehand    History of Present Illness:   This is a pleasant 54-year-old female with past medical history notable for coronary artery disease (recent drug-eluting stent placement to second diagonal after presenting with anterolateral STEMI; troponin was 1.0 -> 1.5 prior to PCI, follow up check was not done), ischemic cardiomyopathy (echocardiogram showed LVEF 40 to 45% with apical, anterior, distal anterolateral and anteroseptal akinesis), hypertension, hyperlipidemia, chronic kidney disease, tobacco use, history of kidney transplant in 2012, and history of liver transplant x2 (in 1990 and in 2012; followed by [facility]), fibromyalgia, anxiety, depression, and cannabis dependence.    She was placed on Brilinta and aspirin.  High intensity statin therapy was initiated.  She was also started on metoprolol.  Due to soft blood pressures her pta amlodipine was discontinued.  She was referred to outpatient cardiac rehab.  She had reports of intermittent episodes of chest discomfort that were described as sharp, not similar to what she had prior to her STEMI. Rest of her coronary anatomy showed mild disease. LVEDP was normal. Repeat ECG demonstrated anterior Q waves.    She returns to clinic today, stating she is okay. She has several concerns- she has been having intermittent episodes of SOB. These are brief in duration.  They can come on at any time.  She feels that she has difficulty catching her breath.  This has been new since her discharged home.    She is been also noticing some achiness in the center of her chest.  This started after her PCI in the hospital and continues to occur.  It is very intermittent and is not associated with exertion.  She did have it after her 6-minute walk test during cardiac rehab yesterday.  This is not similar to what she had previously before her PCI.  She also feels very tired and has  lightheadedness intermittently.  She denies recurrent throat tightness or bilateral arm numbness which she had prior to her stenting.  She has no bleeding issues.  She denies palpitations, or syncope.    Assessment and Plan:   This is a pleasant 54-year-old female with past medical history notable for coronary artery disease (recent drug-eluting stent placement to second diagonal after presenting with anterolateral STEMI), ischemic cardiomyopathy (echocardiogram showed LVEF 40 to 45% with apical, anterior, distal anterolateral and anteroseptal akinesis), hypertension, hyperlipidemia, chronic kidney disease, history of kidney transplant in 2012, and history of liver transplant x2 (in 1990 and in 2012; followed by [facility]), fibromyalgia, anxiety, depression, and cannabis dependence.    Her heart rate is significantly low.  Metoprolol is new for her.  She would benefit from a lower dose.  We will also switch this to succinate form given her reduced LV function.  She will take Toprol 50 mg daily.  Ideally she should be on lisinopril but her renal function is quite labile.  Her creatinine is up to 1.3 from 0.9.  Blood pressure is high as well.  At this time I will have her resume PTA amlodipine.  We will check her renal function in about 1 to 2 weeks.  If her renal function is stable time we will switch her amlodipine to lisinopril.    Repeat ECG showed anterolateral T wave inversions with anterior q waves. Similar to previous ECG that was post PCI.    I did staff with Dr. Bradley (Dr. Desouza was out of office).  She recommended patient try sublingual nitroglycerin next time she has another episode.  She also recommended repeat limited echocardiogram for evaluation of possible pericarditis.  We will also add CRP and ESR to be done early next week.  I will have patient continue with cardiac rehab.  We will also switch her from Brilinta to Plavix as I suspect her shortness of breath is due to this. We will do a loading dose  of 300mg and then 75 mg daily.  I will have her come back to clinic in about 1 to 2 weeks for reevaluation.    She quit smoking and is not interested in resuming it.  I congratulated her on this.    Her lipid panel shows excellent numbers.  ALT is normal.    Thank you for allowing me to participate in the care of this pleasant patient today.    This note was completed in part using Dragon voice recognition software. Although reviewed after completion, some word and grammatical errors may occur.    Orders this Visit:  Orders Placed This Encounter   Procedures     Basic metabolic panel     Follow-Up with Cardiac Advanced Practice Provider     EKG 12-lead complete w/read - Clinics (performed today)     EKG 12-lead complete w/read - Clinics (performed today)     Orders Placed This Encounter   Medications     metoprolol succinate ER (TOPROL-XL) 50 MG 24 hr tablet     Sig: Take 1 tablet (50 mg) by mouth daily     Dispense:  30 tablet     Refill:  3     Medications Discontinued During This Encounter   Medication Reason     metoprolol tartrate (LOPRESSOR) 50 MG tablet          Encounter Diagnosis   Name Primary?     Hyperlipidemia LDL goal <70 Yes       CURRENT MEDICATIONS:  Current Outpatient Medications   Medication Sig Dispense Refill     aspirin 81 MG tablet Take 81 mg by mouth daily.       atorvastatin (LIPITOR) 40 MG tablet Take 1 tablet (40 mg) by mouth daily 30 tablet 0     Calcium Carb-Cholecalciferol 600-800 MG-UNIT TABS Take 1 tablet by mouth       DULoxetine (CYMBALTA) 60 MG capsule Take 60 mg by mouth 2 times daily       metoprolol succinate ER (TOPROL-XL) 50 MG 24 hr tablet Take 1 tablet (50 mg) by mouth daily 30 tablet 3     mycophenolate (CELLCEPT - GENERIC EQUIVALENT) 250 MG capsule   3     nitroGLYcerin (NITROSTAT) 0.4 MG sublingual tablet For chest pain place 1 tablet under the tongue every 5 minutes for 3 doses. If symptoms persist 5 minutes after 1st dose call 911. 20 tablet 0     omeprazole (PRILOSEC)  40 MG DR capsule Take 40 mg by mouth 2 times daily       omeprazole (PRILOSEC) 40 MG DR capsule TAKE 1 CAPSULE(40 MG) BY MOUTH TWICE DAILY 180 capsule 3     predniSONE (DELTASONE) 5 MG tablet Take 5 mg by mouth every morning       tacrolimus (GENERIC EQUIVALENT) 0.5 MG capsule Managed by pt's transplant team at [facility]  0     tacrolimus (GENERIC EQUIVALENT) 1 MG capsule Rx managed by transplant team.       ticagrelor (BRILINTA) 90 MG tablet Take 1 tablet (90 mg) by mouth every 12 hours 60 tablet 11     zolpidem (AMBIEN) 10 MG tablet TAKE 1 TABLET BY MOUTH AT BEDTIME 30 tablet 0     amLODIPine (NORVASC) 5 MG tablet TAKE 1 TABLET DAILY (Patient not taking: Reported on 10/18/2019) 90 tablet 1     busPIRone (BUSPAR) 5 MG tablet Take 1 tablet (5 mg) by mouth 3 times daily as needed 60 tablet 1     butalbital-acetaminophen-caffeine (FIORICET/ESGIC) -40 MG tablet TAKE ONE TABLET BY MOUTH EVERY 4 HOURS AS NEEDED FOR HEADACHES 30 tablet 3       ALLERGIES     Allergies   Allergen Reactions     Acetaminophen-Codeine      Other reaction(s): GI intolerance     Chlorhexidine Other (See Comments)     burning     Codeine Other (See Comments)     Codeine Sulfate Nausea and Vomiting     Diatrizoate Other (See Comments)     Patient has a transplanted kidney so dye should be with caution if at all.  Patient has a transplanted kidney so dye should be with caution if at all.     Hydrocodone-Acetaminophen Other (See Comments)     doesn't work     Hydromorphone Nausea and Vomiting       PAST MEDICAL HISTORY:  Past Medical History:   Diagnosis Date     Anemia 8/22/2013     Anxiety      Cannabis dependence (H)      Chronic kidney disease, stage IV (severe) (H)     [facility]      Complication of transplanted kidney 11/29/2012     Depression      Fibromyalgia 10/7/2016     History of gout 2/12/2013     History of kidney transplant 12/17/2012     HTN (hypertension)      Hyperlipidaemia LDL goal < 100      Hyperlipidemia with target LDL  less than 100      Insomnia, unspecified type 7/17/2018     Liver cell carcinoma (H) 9/29/2010    Overview:  Was diagnosed at age 4 years old per patient's report. At 7/1990 right after pregnancy she underwent liver transplant.      Liver transplant 1990 and 2012     2 transplants-[facility] ,1-2 x year     Obesity (BMI 35.0-39.9) with comorbidity (H) 2/13/2019     Raynaud phenomenon 8/22/2013     S/P coronary angiogram 10/10/2019    PAWAN to second diagonal     Scleroderma, diffuse (H) 8/22/2013     STEMI (ST elevation myocardial infarction) (H) 10/10/2019     Tobacco use        PAST SURGICAL HISTORY:  Past Surgical History:   Procedure Laterality Date     BIOPSY       CV CORONARY ANGIOGRAM N/A 10/10/2019    Procedure: Coronary Angiogram;  Surgeon: Rajendra De La Torre MD;  Location:  HEART CARDIAC CATH LAB     CV LEFT HEART CATH N/A 10/10/2019    Procedure: Left Heart Cath;  Surgeon: Rajendra De La Torre MD;  Location:  HEART CARDIAC CATH LAB     CV PCI STENT DRUG ELUTING N/A 10/10/2019    Procedure: PCI Stent Drug Eluting;  Surgeon: Rajendra De La Torre MD;  Location:  HEART CARDIAC CATH LAB     GI SURGERY       HEART CATH LEFT HEART CATH  10/10/2019    Successful PCI of the D2 with a [id number] Synergy drug eluting stent.     RETURN LIVER TRANSPLANT      21 years ago     TRANSPLANT         FAMILY HISTORY:  Family History   Problem Relation Age of Onset     Substance Abuse Sister        SOCIAL HISTORY:  Social History     Socioeconomic History     Marital status:      Spouse name: Not on file     Number of children: Not on file     Years of education: Not on file     Highest education level: Not on file   Occupational History     Not on file   Social Needs     Financial resource strain: Not on file     Food insecurity:     Worry: Not on file     Inability: Not on file     Transportation needs:     Medical: Not on file     Non-medical: Not on file   Tobacco Use     Smoking status: Former Smoker     " Packs/day: 0.30     Years: 30.00     Pack years: 9.00     Types: Cigarettes     Last attempt to quit: 10/30/2012     Years since quittin.9     Smokeless tobacco: Never Used   Substance and Sexual Activity     Alcohol use: No     Alcohol/week: 0.0 standard drinks     Drug use: No     Types: Marijuana     Sexual activity: Yes     Partners: Male   Lifestyle     Physical activity:     Days per week: Not on file     Minutes per session: Not on file     Stress: Not on file   Relationships     Social connections:     Talks on phone: Not on file     Gets together: Not on file     Attends Sabianist service: Not on file     Active member of club or organization: Not on file     Attends meetings of clubs or organizations: Not on file     Relationship status: Not on file     Intimate partner violence:     Fear of current or ex partner: Not on file     Emotionally abused: Not on file     Physically abused: Not on file     Forced sexual activity: Not on file   Other Topics Concern     Parent/sibling w/ CABG, MI or angioplasty before 65F 55M? Not Asked   Social History Narrative     Not on file       Review of Systems:  Skin:  Negative     Eyes:  Positive for glasses  ENT:  Negative    Respiratory:  Positive for shortness of breath  Cardiovascular:    fatigue;lightheadedness;Positive for  Gastroenterology: Positive for poor appetite  Genitourinary:  Negative    Musculoskeletal:  Positive for fibromyalgia  Neurologic:  Positive for migraine headaches;headaches;numbness or tingling of hands  Psychiatric:  Positive for sleep disturbances;anxiety;depression;excessive stress  Heme/Lymph/Imm:  Positive for allergies  Endocrine:  Positive for hot flashes    Physical Exam:  Vitals: BP (!) 149/81 (BP Location: Left arm, Patient Position: Sitting, Cuff Size: Adult Regular)   Pulse (!) 46   Ht 1.549 m (5' 1\")   Wt 81 kg (178 lb 8 oz)   SpO2 93%   BMI 33.73 kg/m       GEN:  NAD  NECK: No JVD  C/V:  Regular rate and rhythm.  I hear " no rub.  RESP: Clear to auscultation bilaterally.  GI: Soft, non-tender.  EXTREM: No LE edema.   NEURO: Alert and oriented, cooperative.   PSYCH: Normal affect.  SKIN: Warm and dry.       Recent Lab Results:  LIPID RESULTS:  Lab Results   Component Value Date    CHOL 142 10/18/2019    HDL 45 (L) 10/18/2019    LDL 70 10/18/2019    TRIG 133 10/18/2019       LIVER ENZYME RESULTS:  Lab Results   Component Value Date    AST 12 10/18/2019    ALT <5 (L) 10/18/2019       CBC RESULTS:  Lab Results   Component Value Date    WBC 8.8 10/11/2019    RBC 5.18 10/11/2019    HGB 14.6 10/11/2019    HCT 43.8 10/11/2019    MCV 85 10/11/2019    MCH 28.2 10/11/2019    MCHC 33.3 10/11/2019    RDW 14.3 10/11/2019     10/11/2019       BMP RESULTS:  Lab Results   Component Value Date     10/18/2019    POTASSIUM 4.1 10/18/2019    CHLORIDE 106 10/18/2019    CO2 25 10/18/2019    ANIONGAP 13.1 10/18/2019     (H) 10/18/2019    BUN 36 (H) 10/18/2019    CR 1.36 (H) 10/18/2019    GFRESTIMATED 41 (L) 10/18/2019    GFRESTBLACK 49 (L) 10/18/2019    LANDEN 9.6 10/18/2019        A1C RESULTS:  Lab Results   Component Value Date    A1C 4.5 02/22/2012       INR RESULTS:  Lab Results   Component Value Date    INR 0.87 10/10/2019    INR 1.0 07/06/2015           Romero Sapp PA-C, DIOMEDES   October 18, 2019

## 2019-10-18 NOTE — LETTER
10/18/2019    Loyd Morley MD  [address]  [facility] [address]    RE: Nani Ford       Dear Colleague,    I had the pleasure of seeing Nani Ford in the [facility].    Primary Cardiologist: Dr. Desouza    Reason for Visit: Hospital Follow up with BMP, Lipid/ALT, and ECG beforehand    History of Present Illness:   This is a pleasant 54-year-old female with past medical history notable for coronary artery disease (recent drug-eluting stent placement to second diagonal after presenting with anterolateral STEMI; troponin was 1.0 -> 1.5 prior to PCI, follow up check was not done), ischemic cardiomyopathy (echocardiogram showed LVEF 40 to 45% with apical, anterior, distal anterolateral and anteroseptal akinesis), hypertension, hyperlipidemia, chronic kidney disease, tobacco use, history of kidney transplant in 2012, and history of liver transplant x2 (in 1990 and in 2012; followed by [place]), fibromyalgia, anxiety, depression, and cannabis dependence.    She was placed on Brilinta and aspirin.  High intensity statin therapy was initiated.  She was also started on metoprolol.  Due to soft blood pressures her pta amlodipine was discontinued.  She was referred to outpatient cardiac rehab.  She had reports of intermittent episodes of chest discomfort that were described as sharp, not similar to what she had prior to her STEMI. Rest of her coronary anatomy showed mild disease. LVEDP was normal. Repeat ECG demonstrated anterior Q waves.    She returns to clinic today, stating she is okay. She has several concerns- she has been having intermittent episodes of SOB. These are brief in duration.  They can come on at any time.  She feels that she has difficulty catching her breath.  This has been new since her discharged home.    She is been also noticing some achiness in the center of her chest.  This started after her PCI in the hospital and continues to occur.  It is  very intermittent and is not associated with exertion.  She did have it after her 6-minute walk test during cardiac rehab yesterday.  This is not similar to what she had previously before her PCI.  She also feels very tired and has lightheadedness intermittently.  She denies recurrent throat tightness or bilateral arm numbness which she had prior to her stenting.  She has no bleeding issues.  She denies palpitations, or syncope.    Assessment and Plan:   This is a pleasant 54-year-old female with past medical history notable for coronary artery disease (recent drug-eluting stent placement to second diagonal after presenting with anterolateral STEMI), ischemic cardiomyopathy (echocardiogram showed LVEF 40 to 45% with apical, anterior, distal anterolateral and anteroseptal akinesis), hypertension, hyperlipidemia, chronic kidney disease, history of kidney transplant in 2012, and history of liver transplant x2 (in 1990 and in 2012; followed by [place]), fibromyalgia, anxiety, depression, and cannabis dependence.    Her heart rate is significantly low.  Metoprolol is new for her.  She would benefit from a lower dose.  We will also switch this to succinate form given her reduced LV function.  She will take Toprol 50 mg daily.  Ideally she should be on lisinopril but her renal function is quite labile.  Her creatinine is up to 1.3 from 0.9.  Blood pressure is high as well.  At this time I will have her resume PTA amlodipine.  We will check her renal function in about 1 to 2 weeks.  If her renal function is stable time we will switch her amlodipine to lisinopril.    Repeat ECG showed anterolateral T wave inversions with anterior q waves. Similar to previous ECG that was post PCI.    I did staff with Dr. Bradley (Dr. Desouza was out of office).  She recommended patient try sublingual nitroglycerin next time she has another episode.  She also recommended repeat limited echocardiogram for evaluation of possible pericarditis.  We  will also add CRP and ESR to be done early next week.  I will have patient continue with cardiac rehab.  We will also switch her from Brilinta to Plavix as I suspect her shortness of breath is due to this. We will do a loading dose of 300mg and then 75 mg daily.  I will have her come back to clinic in about 1 to 2 weeks for reevaluation.    She quit smoking and is not interested in resuming it.  I congratulated her on this.    Her lipid panel shows excellent numbers.  ALT is normal.    Thank you for allowing me to participate in the care of this pleasant patient today.    This note was completed in part using Dragon voice recognition software. Although reviewed after completion, some word and grammatical errors may occur.    Orders this Visit:  Orders Placed This Encounter   Procedures     Basic metabolic panel     Follow-Up with Cardiac Advanced Practice Provider     EKG 12-lead complete w/read - Clinics (performed today)     EKG 12-lead complete w/read - Clinics (performed today)     Orders Placed This Encounter   Medications     metoprolol succinate ER (TOPROL-XL) 50 MG 24 hr tablet     Sig: Take 1 tablet (50 mg) by mouth daily     Dispense:  30 tablet     Refill:  3     Medications Discontinued During This Encounter   Medication Reason     metoprolol tartrate (LOPRESSOR) 50 MG tablet          Encounter Diagnosis   Name Primary?     Hyperlipidemia LDL goal <70 Yes       CURRENT MEDICATIONS:  Current Outpatient Medications   Medication Sig Dispense Refill     aspirin 81 MG tablet Take 81 mg by mouth daily.       atorvastatin (LIPITOR) 40 MG tablet Take 1 tablet (40 mg) by mouth daily 30 tablet 0     Calcium Carb-Cholecalciferol 600-800 MG-UNIT TABS Take 1 tablet by mouth       DULoxetine (CYMBALTA) 60 MG capsule Take 60 mg by mouth 2 times daily       metoprolol succinate ER (TOPROL-XL) 50 MG 24 hr tablet Take 1 tablet (50 mg) by mouth daily 30 tablet 3     mycophenolate (CELLCEPT - GENERIC EQUIVALENT) 250 MG  capsule   3     nitroGLYcerin (NITROSTAT) 0.4 MG sublingual tablet For chest pain place 1 tablet under the tongue every 5 minutes for 3 doses. If symptoms persist 5 minutes after 1st dose call 911. 20 tablet 0     omeprazole (PRILOSEC) 40 MG DR capsule Take 40 mg by mouth 2 times daily       omeprazole (PRILOSEC) 40 MG DR capsule TAKE 1 CAPSULE(40 MG) BY MOUTH TWICE DAILY 180 capsule 3     predniSONE (DELTASONE) 5 MG tablet Take 5 mg by mouth every morning       tacrolimus (GENERIC EQUIVALENT) 0.5 MG capsule Managed by pt's transplant team at [place]  0     tacrolimus (GENERIC EQUIVALENT) 1 MG capsule Rx managed by transplant team.       ticagrelor (BRILINTA) 90 MG tablet Take 1 tablet (90 mg) by mouth every 12 hours 60 tablet 11     zolpidem (AMBIEN) 10 MG tablet TAKE 1 TABLET BY MOUTH AT BEDTIME 30 tablet 0     amLODIPine (NORVASC) 5 MG tablet TAKE 1 TABLET DAILY (Patient not taking: Reported on 10/18/2019) 90 tablet 1     busPIRone (BUSPAR) 5 MG tablet Take 1 tablet (5 mg) by mouth 3 times daily as needed 60 tablet 1     butalbital-acetaminophen-caffeine (FIORICET/ESGIC) -40 MG tablet TAKE ONE TABLET BY MOUTH EVERY 4 HOURS AS NEEDED FOR HEADACHES 30 tablet 3       ALLERGIES     Allergies   Allergen Reactions     Acetaminophen-Codeine      Other reaction(s): GI intolerance     Chlorhexidine Other (See Comments)     burning     Codeine Other (See Comments)     Codeine Sulfate Nausea and Vomiting     Diatrizoate Other (See Comments)     Patient has a transplanted kidney so dye should be with caution if at all.  Patient has a transplanted kidney so dye should be with caution if at all.     Hydrocodone-Acetaminophen Other (See Comments)     doesn't work     Hydromorphone Nausea and Vomiting       PAST MEDICAL HISTORY:  Past Medical History:   Diagnosis Date     Anemia 8/22/2013     Anxiety      Cannabis dependence (H)      Chronic kidney disease, stage IV (severe) (H)     [facility]      Complication of  transplanted kidney 11/29/2012     Depression      Fibromyalgia 10/7/2016     History of gout 2/12/2013     History of kidney transplant 12/17/2012     HTN (hypertension)      Hyperlipidaemia LDL goal < 100      Hyperlipidemia with target LDL less than 100      Insomnia, unspecified type 7/17/2018     Liver cell carcinoma (H) 9/29/2010    Overview:  Was diagnosed at age 4 years old per patient's report. At 7/1990 right after pregnancy she underwent liver transplant.      Liver transplant 1990 and 2012     2 transplants-[facility] fu ,1-2 x year     Obesity (BMI 35.0-39.9) with comorbidity (H) 2/13/2019     Raynaud phenomenon 8/22/2013     S/P coronary angiogram 10/10/2019    PAWAN to second diagonal     Scleroderma, diffuse (H) 8/22/2013     STEMI (ST elevation myocardial infarction) (H) 10/10/2019     Tobacco use        PAST SURGICAL HISTORY:  Past Surgical History:   Procedure Laterality Date     BIOPSY       CV CORONARY ANGIOGRAM N/A 10/10/2019    Procedure: Coronary Angiogram;  Surgeon: Rajendra De La Torre MD;  Location:  HEART CARDIAC CATH LAB     CV LEFT HEART CATH N/A 10/10/2019    Procedure: Left Heart Cath;  Surgeon: Rajendra De La Torre MD;  Location:  HEART CARDIAC CATH LAB     CV PCI STENT DRUG ELUTING N/A 10/10/2019    Procedure: PCI Stent Drug Eluting;  Surgeon: Rajendra De La Torre MD;  Location:  HEART CARDIAC CATH LAB     GI SURGERY       HEART CATH LEFT HEART CATH  10/10/2019    Successful PCI of the D2 with a [id number] Synergy drug eluting stent.     RETURN LIVER TRANSPLANT      21 years ago     TRANSPLANT         FAMILY HISTORY:  Family History   Problem Relation Age of Onset     Substance Abuse Sister        SOCIAL HISTORY:  Social History     Socioeconomic History     Marital status:      Spouse name: Not on file     Number of children: Not on file     Years of education: Not on file     Highest education level: Not on file   Occupational History     Not on file   Social Needs      Financial resource strain: Not on file     Food insecurity:     Worry: Not on file     Inability: Not on file     Transportation needs:     Medical: Not on file     Non-medical: Not on file   Tobacco Use     Smoking status: Former Smoker     Packs/day: 0.30     Years: 30.00     Pack years: 9.00     Types: Cigarettes     Last attempt to quit: 10/30/2012     Years since quittin.9     Smokeless tobacco: Never Used   Substance and Sexual Activity     Alcohol use: No     Alcohol/week: 0.0 standard drinks     Drug use: No     Types: Marijuana     Sexual activity: Yes     Partners: Male   Lifestyle     Physical activity:     Days per week: Not on file     Minutes per session: Not on file     Stress: Not on file   Relationships     Social connections:     Talks on phone: Not on file     Gets together: Not on file     Attends Restorationism service: Not on file     Active member of club or organization: Not on file     Attends meetings of clubs or organizations: Not on file     Relationship status: Not on file     Intimate partner violence:     Fear of current or ex partner: Not on file     Emotionally abused: Not on file     Physically abused: Not on file     Forced sexual activity: Not on file   Other Topics Concern     Parent/sibling w/ CABG, MI or angioplasty before 65F 55M? Not Asked   Social History Narrative     Not on file       Review of Systems:  Skin:  Negative     Eyes:  Positive for glasses  ENT:  Negative    Respiratory:  Positive for shortness of breath  Cardiovascular:    fatigue;lightheadedness;Positive for  Gastroenterology: Positive for poor appetite  Genitourinary:  Negative    Musculoskeletal:  Positive for fibromyalgia  Neurologic:  Positive for migraine headaches;headaches;numbness or tingling of hands  Psychiatric:  Positive for sleep disturbances;anxiety;depression;excessive stress  Heme/Lymph/Imm:  Positive for allergies  Endocrine:  Positive for hot flashes    Physical Exam:  Vitals: BP (!)  "149/81 (BP Location: Left arm, Patient Position: Sitting, Cuff Size: Adult Regular)   Pulse (!) 46   Ht 1.549 m (5' 1\")   Wt 81 kg (178 lb 8 oz)   SpO2 93%   BMI 33.73 kg/m        GEN:  NAD  NECK: No JVD  C/V:  Regular rate and rhythm.  I hear no rub.  RESP: Clear to auscultation bilaterally.  GI: Soft, non-tender.  EXTREM: No LE edema.   NEURO: Alert and oriented, cooperative.   PSYCH: Normal affect.  SKIN: Warm and dry.       Recent Lab Results:  LIPID RESULTS:  Lab Results   Component Value Date    CHOL 142 10/18/2019    HDL 45 (L) 10/18/2019    LDL 70 10/18/2019    TRIG 133 10/18/2019       LIVER ENZYME RESULTS:  Lab Results   Component Value Date    AST 12 10/18/2019    ALT <5 (L) 10/18/2019       CBC RESULTS:  Lab Results   Component Value Date    WBC 8.8 10/11/2019    RBC 5.18 10/11/2019    HGB 14.6 10/11/2019    HCT 43.8 10/11/2019    MCV 85 10/11/2019    MCH 28.2 10/11/2019    MCHC 33.3 10/11/2019    RDW 14.3 10/11/2019     10/11/2019       BMP RESULTS:  Lab Results   Component Value Date     10/18/2019    POTASSIUM 4.1 10/18/2019    CHLORIDE 106 10/18/2019    CO2 25 10/18/2019    ANIONGAP 13.1 10/18/2019     (H) 10/18/2019    BUN 36 (H) 10/18/2019    CR 1.36 (H) 10/18/2019    GFRESTIMATED 41 (L) 10/18/2019    GFRESTBLACK 49 (L) 10/18/2019    LANDEN 9.6 10/18/2019        A1C RESULTS:  Lab Results   Component Value Date    A1C 4.5 02/22/2012       INR RESULTS:  Lab Results   Component Value Date    INR 0.87 10/10/2019    INR 1.0 07/06/2015           Romero Sapp PA-C, DIOMEDES   October 18, 2019       Thank you for allowing me to participate in the care of your patient.    Sincerely,     Romero Sapp PA-C     [facility]    "

## 2019-10-18 NOTE — PROGRESS NOTES
Called patient to discuss f/up echo/labs/OV and medication changes per Romero, left message to call back.     1510: Pt returned call. Reviewed Romero's recommendation to discontinue brilinta and start plavix. Pt will take her last dose of brilinta tonight, take 4tabs of plavix (300mg) tomorrow 10/19/19, and then take 1tab (75mg) daily starting [place] 10/20/19. Pt repeated back instructions, no further questions. Reviewed Romero's recommendation to try nitroSL for chest discomfort. Reviewed medication is indicated as needed for chest pain, reviewed nitroSL instructions including sitting down when taking, letting it dissolve under the tongue, and taking doses 5min apart up to three tablets for persistent chest pain. Discussed need to call 911 for chest pain not relieved after two tabs requiring a third. Pt verbalized understanding, no further questions.     Message to scheduling to arrange f/up.

## 2019-10-18 NOTE — PROGRESS NOTES
Depression Followup-     Status since last visit: Worsened     See PHQ-9 for current symptoms.  Other associated symptoms: None    Complicating factors:   Significant life event:  Yes-  MI in past 2 weeks   Current substance abuse:  Cannabis  Anxiety or Panic symptoms:  Yes-  And takes cymbalta    PHQ-9  English PHQ-9   Any Language     ROS otherwise negative including sleep, neuro, CV, skin or GI     /78   Pulse 90   Resp 16   Wt 80.8 kg (178 lb 3.2 oz)   BMI 33.67 kg/m      GENERAL: healthy, alert and no distress  EYES: Eyes grossly normal to inspection, PERRL and conjunctivae and sclerae normal  NEURO: Normal strength and tone, mentation intact and speech normal  PSYCH: mentation appears normal, affect normal/bright    ASSESSMENT:  1. MIMI (generalized anxiety disorder)  Add buspar  See back in one week  Contracts for safety  - busPIRone (BUSPAR) 5 MG tablet; Take 1 tablet (5 mg) by mouth 3 times daily as needed  Dispense: 60 tablet; Refill: 1  - MENTAL HEALTH REFERRAL  - Adult; Outpatient Treatment; Individual/Couples/Family/Group Therapy/Health Psychology; [facility]: [facility] [phone]; We will contact you to schedule the appointment or please call with any questions    2. Nonintractable headache, unspecified chronicity pattern, unspecified headache type  Refilled for now  - butalbital-acetaminophen-caffeine (FIORICET/ESGIC) -40 MG tablet; TAKE ONE TABLET BY MOUTH EVERY 4 HOURS AS NEEDED FOR HEADACHES  Dispense: 30 tablet; Refill: 3     Greater than 25 minutes spent with patient and family discussing risks and benefits and management with possible outcomes regarding this issue with greater than 50% in counseling for medical decision making and coordination of care.

## 2019-10-20 LAB — CRP SERPL-MCNC: <2.9 MG/L (ref 0–8)

## 2019-10-25 ENCOUNTER — OFFICE VISIT (OUTPATIENT)
Dept: FAMILY MEDICINE | Facility: CLINIC | Age: 54
End: 2019-10-25

## 2019-10-25 ENCOUNTER — HOSPITAL ENCOUNTER (OUTPATIENT)
Dept: CARDIAC REHAB | Facility: CLINIC | Age: 54
End: 2019-10-25
Attending: STUDENT IN AN ORGANIZED HEALTH CARE EDUCATION/TRAINING PROGRAM
Payer: COMMERCIAL

## 2019-10-25 VITALS
BODY MASS INDEX: 33.71 KG/M2 | OXYGEN SATURATION: 92 % | HEART RATE: 67 BPM | DIASTOLIC BLOOD PRESSURE: 76 MMHG | RESPIRATION RATE: 16 BRPM | SYSTOLIC BLOOD PRESSURE: 122 MMHG | WEIGHT: 178.4 LBS

## 2019-10-25 DIAGNOSIS — F33.1 MAJOR DEPRESSIVE DISORDER, RECURRENT EPISODE, MODERATE (H): Primary | ICD-10-CM

## 2019-10-25 PROCEDURE — 40000116 ZZH STATISTIC OP CR VISIT

## 2019-10-25 PROCEDURE — 93798 PHYS/QHP OP CAR RHAB W/ECG: CPT

## 2019-10-25 PROCEDURE — 99214 OFFICE O/P EST MOD 30 MIN: CPT | Performed by: FAMILY MEDICINE

## 2019-10-25 RX ORDER — ARIPIPRAZOLE 2 MG/1
2 TABLET ORAL DAILY
Qty: 30 TABLET | Refills: 0 | Status: SHIPPED | OUTPATIENT
Start: 2019-10-25 | End: 2020-05-04 | Stop reason: DRUGHIGH

## 2019-10-25 NOTE — PROGRESS NOTES
Italia is here today in follow up for her MDD.   She has been max dose cymbalta and buspar was added for breakthrough sx regarding her anxiety.   She is not doing well and is here today in follow up.   She has an upcoming appt for therapy but not until December.   No SI but is having marked difficulty with motivation and feeling stuck    ROS otherwise negative including sleep, neuro, CV, skin or GI    /76   Pulse 67   Resp 16   Wt 80.9 kg (178 lb 6.4 oz)   SpO2 92%   BMI 33.71 kg/m      GENERAL: healthy, alert and no distress  EYES: Eyes grossly normal to inspection, PERRL and conjunctivae and sclerae normal  RESP: lungs clear to auscultation - no rales, rhonchi or wheezes  CV: regular rate and rhythm, normal S1 S2  MS: no gross musculoskeletal defects noted, no edema  SKIN: no suspicious lesions or rashes  NEURO: Normal strength and tone, mentation intact and speech normal  PSYCH: mentation appears normal, affect normal/bright    ASSESSMENT:  1. Major depressive disorder, recurrent episode, moderate (H)  Add Abilify low dose and see back in one week   Contracts for safety and to ED immed with increased sx, SI, etc  - ARIPiprazole (ABILIFY) 2 MG tablet; Take 1 tablet (2 mg) by mouth daily  Dispense: 30 tablet; Refill: 0

## 2019-10-30 ENCOUNTER — HOSPITAL ENCOUNTER (OUTPATIENT)
Dept: CARDIAC REHAB | Facility: CLINIC | Age: 54
End: 2019-10-30
Attending: STUDENT IN AN ORGANIZED HEALTH CARE EDUCATION/TRAINING PROGRAM
Payer: COMMERCIAL

## 2019-10-30 PROCEDURE — 40000116 ZZH STATISTIC OP CR VISIT: Performed by: CLINICAL EXERCISE PHYSIOLOGIST

## 2019-10-30 PROCEDURE — 93798 PHYS/QHP OP CAR RHAB W/ECG: CPT | Performed by: CLINICAL EXERCISE PHYSIOLOGIST

## 2019-11-01 ENCOUNTER — OFFICE VISIT (OUTPATIENT)
Dept: FAMILY MEDICINE | Facility: CLINIC | Age: 54
End: 2019-11-01

## 2019-11-01 VITALS
DIASTOLIC BLOOD PRESSURE: 80 MMHG | BODY MASS INDEX: 33.25 KG/M2 | WEIGHT: 176 LBS | HEART RATE: 101 BPM | OXYGEN SATURATION: 97 % | RESPIRATION RATE: 16 BRPM | SYSTOLIC BLOOD PRESSURE: 136 MMHG

## 2019-11-01 DIAGNOSIS — F33.1 MAJOR DEPRESSIVE DISORDER, RECURRENT EPISODE, MODERATE (H): Primary | ICD-10-CM

## 2019-11-01 PROCEDURE — 99214 OFFICE O/P EST MOD 30 MIN: CPT | Performed by: FAMILY MEDICINE

## 2019-11-01 RX ORDER — ARIPIPRAZOLE 5 MG/1
5 TABLET ORAL DAILY
Qty: 30 TABLET | Refills: 0 | Status: SHIPPED | OUTPATIENT
Start: 2019-11-01 | End: 2019-12-03

## 2019-11-01 NOTE — PROGRESS NOTES
CHIEF COMPLAINT:   Chief Complaint   Patient presents with     Follow Up     Italia is here today in follow up for her MDD.   She has been max dose cymbalta and buspar was added for breakthrough sx regarding her anxiety. Most recently we added abilify.   This didn't help at 2 mg but at 4 mg she has noted a marked difference   It does however seem to wear off in the mid afternoon    She is undergoing rehab for her recent STEMI     ROS otherwise negative including sleep, neuro, CV, skin or GI     /80   Pulse 101   Resp 16   Wt 79.8 kg (176 lb)   SpO2 97%   BMI 33.25 kg/m      GENERAL: healthy, alert and no distress  EYES: Eyes grossly normal to inspection, PERRL and conjunctivae and sclerae normal  RESP: lungs clear to auscultation - no rales, rhonchi or wheezes  CV: regular rate and rhythm, normal S1 S2  MS: no gross musculoskeletal defects noted, no edema  SKIN: no suspicious lesions or rashes  NEURO: Normal strength and tone, mentation intact and speech normal  PSYCH: mentation appears normal, affect normal/bright     ASSESSMENT:  1. Major depressive disorder, recurrent episode, moderate (H)  Increase abilify to 5 mg daily   Call me in two weeks for progress report

## 2019-11-02 DIAGNOSIS — Z76.0 ENCOUNTER FOR MEDICATION REFILL: ICD-10-CM

## 2019-11-03 RX ORDER — AMLODIPINE BESYLATE 5 MG/1
TABLET ORAL
Qty: 90 TABLET | Refills: 4 | Status: SHIPPED | OUTPATIENT
Start: 2019-11-03 | End: 2021-01-26

## 2019-11-05 ENCOUNTER — HOSPITAL ENCOUNTER (OUTPATIENT)
Dept: CARDIOLOGY | Facility: CLINIC | Age: 54
Discharge: HOME | End: 2019-11-05
Attending: PHYSICIAN ASSISTANT | Admitting: PHYSICIAN ASSISTANT
Payer: COMMERCIAL

## 2019-11-05 DIAGNOSIS — R07.89 ATYPICAL CHEST PAIN: ICD-10-CM

## 2019-11-05 DIAGNOSIS — I25.5 ISCHEMIC CARDIOMYOPATHY: ICD-10-CM

## 2019-11-05 DIAGNOSIS — E78.5 HYPERLIPIDEMIA LDL GOAL <70: ICD-10-CM

## 2019-11-05 DIAGNOSIS — I25.10 CORONARY ARTERY DISEASE INVOLVING NATIVE CORONARY ARTERY OF NATIVE HEART WITHOUT ANGINA PECTORIS: ICD-10-CM

## 2019-11-05 DIAGNOSIS — I21.29 ST ELEVATION MYOCARDIAL INFARCTION (STEMI) INVOLVING OTHER CORONARY ARTERY (H): ICD-10-CM

## 2019-11-05 DIAGNOSIS — I25.118 CORONARY ARTERY DISEASE OF NATIVE HEART WITH STABLE ANGINA PECTORIS, UNSPECIFIED VESSEL OR LESION TYPE (H): ICD-10-CM

## 2019-11-05 LAB
ANION GAP SERPL CALCULATED.3IONS-SCNC: 13.5 MMOL/L (ref 6–17)
BUN SERPL-MCNC: 14 MG/DL (ref 7–30)
CALCIUM SERPL-MCNC: 9.9 MG/DL (ref 8.5–10.5)
CHLORIDE SERPL-SCNC: 104 MMOL/L (ref 98–107)
CO2 SERPL-SCNC: 25 MMOL/L (ref 23–29)
CREAT SERPL-MCNC: 1.14 MG/DL (ref 0.7–1.3)
CRP SERPL HS-MCNC: 2.3 MG/L
ERYTHROCYTE [SEDIMENTATION RATE] IN BLOOD BY WESTERGREN METHOD: 27 MM/H (ref 0–30)
GFR SERPL CREATININE-BSD FRML MDRD: 50 ML/MIN/{1.73_M2}
GLUCOSE SERPL-MCNC: 100 MG/DL (ref 70–105)
POTASSIUM SERPL-SCNC: 4.5 MMOL/L (ref 3.5–5.1)
SODIUM SERPL-SCNC: 138 MMOL/L (ref 136–145)

## 2019-11-05 PROCEDURE — 93321 DOPPLER ECHO F-UP/LMTD STD: CPT | Mod: 26 | Performed by: INTERNAL MEDICINE

## 2019-11-05 PROCEDURE — 85652 RBC SED RATE AUTOMATED: CPT | Performed by: INTERNAL MEDICINE

## 2019-11-05 PROCEDURE — 93308 TTE F-UP OR LMTD: CPT | Mod: 26 | Performed by: INTERNAL MEDICINE

## 2019-11-05 PROCEDURE — 93308 TTE F-UP OR LMTD: CPT

## 2019-11-05 PROCEDURE — 36415 COLL VENOUS BLD VENIPUNCTURE: CPT | Performed by: INTERNAL MEDICINE

## 2019-11-05 PROCEDURE — 86141 C-REACTIVE PROTEIN HS: CPT | Performed by: INTERNAL MEDICINE

## 2019-11-05 PROCEDURE — 80048 BASIC METABOLIC PNL TOTAL CA: CPT | Performed by: INTERNAL MEDICINE

## 2019-11-05 PROCEDURE — 93325 DOPPLER ECHO COLOR FLOW MAPG: CPT | Mod: 26 | Performed by: INTERNAL MEDICINE

## 2019-11-11 NOTE — PROGRESS NOTES
Cardiac Rehab Discharge Summary    Reason for discharge:    Patient/family request discontinuation of services.    Progress towards goals:  Goals partially met.  Barriers to achieving goals:   early discharge.    Recommendation(s):    Continue home exercise program.    Physician cosignature/electronic signature indicates agreements with the ITP document and approval of discharge.

## 2019-11-12 ENCOUNTER — OFFICE VISIT (OUTPATIENT)
Dept: CARDIOLOGY | Facility: CLINIC | Age: 54
End: 2019-11-12
Attending: PHYSICIAN ASSISTANT
Payer: COMMERCIAL

## 2019-11-12 VITALS
DIASTOLIC BLOOD PRESSURE: 84 MMHG | SYSTOLIC BLOOD PRESSURE: 127 MMHG | HEIGHT: 62 IN | HEART RATE: 66 BPM | BODY MASS INDEX: 32.97 KG/M2 | WEIGHT: 179.2 LBS

## 2019-11-12 DIAGNOSIS — E78.5 HYPERLIPIDEMIA LDL GOAL <70: ICD-10-CM

## 2019-11-12 DIAGNOSIS — I25.5 ISCHEMIC CARDIOMYOPATHY: ICD-10-CM

## 2019-11-12 DIAGNOSIS — I25.10 CORONARY ARTERY DISEASE INVOLVING NATIVE CORONARY ARTERY OF NATIVE HEART WITHOUT ANGINA PECTORIS: ICD-10-CM

## 2019-11-12 DIAGNOSIS — R07.89 ATYPICAL CHEST PAIN: ICD-10-CM

## 2019-11-12 PROCEDURE — 99214 OFFICE O/P EST MOD 30 MIN: CPT | Performed by: NURSE PRACTITIONER

## 2019-11-12 ASSESSMENT — MIFFLIN-ST. JEOR: SCORE: 1376.1

## 2019-11-12 NOTE — LETTER
11/12/2019    Loyd Morley MD  [address]  [facility] [id number]    RE: Nani Ford       Dear Colleague,    I had the pleasure of seeing Nani Ford in the [facility].    HPI and Plan:   I had the pleasure of seeing Nani Ford today at [facility] for evaluation of coronary artery disease. She is a pleasant 54 year old patient of Dr. Desouza.     Ms. Ford has a past medical history significant for coronary artery disease status post STEMI, resolved ischemic cardiomyopathy, hypertension, hyperlipidemia, chronic kidney disease status post kidney in 2012, history of liver transplant x2 in 1990 and again in 2012, fibromyalgia, anxiety, depression, and cannabis dependence.     The patient was recently admitted to [facility] with complaints of chest pain.  Troponin elevated at 1.5.  EKG showed sinus tachycardia with ST segment elevation in leads I, aVL, V2, V3, V4, V5, V6.  There is reciprocal depression in leads II, with 3 and aVF.  She urgently underwent a coronary angiography for an acute anterolateral wall ST segment elevation myocardial infarction.  Single-vessel obstructive coronary artery disease was noted of the second obtuse marginal mild nonobstructive disease elsewhere.  Successful PCI with a drug-eluting stent to the second diagonal.  She was appropriately initiated on aspirin lifelong and Brilinta for 1 year uninterrupted.  Echocardiogram showed a mildly reduced left ventricular systolic function with an ejection fraction of 40 to 45% with apical, anterior, distal anterolateral and anteroseptal akinesis.  She was then started on metoprolol tartrate for her ischemic cardiomyopathy.    Following discharge, she was seen in clinic by DIOMEDES Jasso.  During this visit, the patient's heart rates were noted to be significantly low.  She was transitioned from metoprolol tartrate to metoprolol succinate 50 mg  daily.  Lisinopril was not initiated due to elevated renal function.  The patient noted ongoing shortness of breath.  The Brilinta was transition to Plavix with a loading dose.  A CRP and sed rate was completed in addition to a limited echocardiogram to rule out pericarditis.      CRP and sed rate were within normal limits.  Limited echocardiogram showed an improvement of her left ventricular systolic function with an ejection fraction at 55 to 60% (previously 40 to 45%).  Left ventricular diastolic function was normal.  Right ventricle was normal.  No hemodynamically significant valvular abnormalities or wall motion abnormalities noted.    Today she presents the cardiology clinic to review the above testing.  She is doing well with significant improvement in her shortness of breath.  She denies chest pain.  She started cardiac rehab however she is not interested in completing the program as she feels she is above the level of activity being assigned to her.  She remains active at home with her chores however no activity to increase her heart rate for a given amount of time.  She is tolerating her medications without side effects.    Physical Exam  Please see Below     Assessment and Plan  1. Coronary artery disease status post recent STEMI with a drug-eluting stent to the second diagonal.  She continues to do well without recurrent angina.  She continues on aspirin lifelong and Plavix for 1 year uninterrupted.  She continues on atorvastatin without side effects.  I have discussed with her at length today the benefits of cardiac rehab however, she is not interested at this time.  She is scheduled to follow-up with Dr. Desouza in December 2018.    2. Hyperlipidemia.  LDL 70.  Continue atorvastatin.    3.  Ischemic cardiomyopathy, resolved.  LVEF 55 to 60%.  She appears euvolemic.  Continue metoprolol XL.    4.  Hypertension.  Well-controlled.  Continue current regimen.    5. History of kidney transplant and liver  transplant x2.  Avoid nephrotoxic drugs.  Followed by the [facility].    Thank you for allowing me to care for Nani Ford today.    HOLDEN Green, CNP  Cardiology    Voice recognition software was used for this note, I have reviewed this note, but errors may have been missed.    No orders of the defined types were placed in this encounter.    No orders of the defined types were placed in this encounter.    There are no discontinued medications.      CURRENT MEDICATIONS:  Current Outpatient Medications   Medication Sig Dispense Refill     amLODIPine (NORVASC) 5 MG tablet TAKE 1 TABLET DAILY 90 tablet 4     ARIPiprazole (ABILIFY) 2 MG tablet Take 1 tablet (2 mg) by mouth daily 30 tablet 0     ARIPiprazole (ABILIFY) 5 MG tablet Take 1 tablet (5 mg) by mouth daily 30 tablet 0     aspirin 81 MG tablet Take 81 mg by mouth daily.       atorvastatin (LIPITOR) 40 MG tablet Take 1 tablet (40 mg) by mouth daily 30 tablet 0     butalbital-acetaminophen-caffeine (FIORICET/ESGIC) -40 MG tablet TAKE ONE TABLET BY MOUTH EVERY 4 HOURS AS NEEDED FOR HEADACHES 30 tablet 3     Calcium Carb-Cholecalciferol 600-800 MG-UNIT TABS Take 1 tablet by mouth       clopidogrel (PLAVIX) 75 MG tablet Take 4 tablets (300 mg) on day 1 and after this one tablet (75 mg) once a day. 90 tablet 3     DULoxetine (CYMBALTA) 60 MG capsule Take 60 mg by mouth 2 times daily       metoprolol succinate ER (TOPROL-XL) 50 MG 24 hr tablet Take 1 tablet (50 mg) by mouth daily 30 tablet 3     nitroGLYcerin (NITROSTAT) 0.4 MG sublingual tablet For chest pain place 1 tablet under the tongue every 5 minutes for 3 doses. If symptoms persist 5 minutes after 1st dose call 911. 20 tablet 0     omeprazole (PRILOSEC) 40 MG DR capsule Take 40 mg by mouth 2 times daily       omeprazole (PRILOSEC) 40 MG DR capsule TAKE 1 CAPSULE(40 MG) BY MOUTH TWICE DAILY 180 capsule 3     predniSONE (DELTASONE) 5 MG tablet Take 5 mg by mouth every morning       tacrolimus  (GENERIC EQUIVALENT) 0.5 MG capsule Managed by pt's transplant team at [place]  0     tacrolimus (GENERIC EQUIVALENT) 1 MG capsule Rx managed by transplant team.       zolpidem (AMBIEN) 10 MG tablet TAKE 1 TABLET BY MOUTH AT BEDTIME 30 tablet 0     busPIRone (BUSPAR) 5 MG tablet Take 1 tablet (5 mg) by mouth 3 times daily as needed (Patient not taking: Reported on 11/12/2019) 60 tablet 1     mycophenolate (CELLCEPT - GENERIC EQUIVALENT) 250 MG capsule   3       ALLERGIES     Allergies   Allergen Reactions     Acetaminophen-Codeine      Other reaction(s): GI intolerance     Chlorhexidine Other (See Comments)     burning     Codeine Other (See Comments)     Codeine Sulfate Nausea and Vomiting     Diatrizoate Other (See Comments)     Patient has a transplanted kidney so dye should be with caution if at all.  Patient has a transplanted kidney so dye should be with caution if at all.     Hydrocodone-Acetaminophen Other (See Comments)     doesn't work     Hydromorphone Nausea and Vomiting       PAST MEDICAL HISTORY:  Past Medical History:   Diagnosis Date     Anemia 8/22/2013     Anxiety      Atypical chest pain      CAD (coronary artery disease)      Cannabis dependence (H)      Chronic kidney disease, stage IV (severe) (H)     [facility] fu      Complication of transplanted kidney 11/29/2012     Fibromyalgia 10/7/2016     Former smoker      History of gout 2/12/2013     History of kidney transplant 12/17/2012     HTN (hypertension)      Hyperlipidaemia LDL goal < 100      Hyperlipidemia with target LDL less than 100      Insomnia, unspecified type 7/17/2018     Ischemic cardiomyopathy      Liver cell carcinoma (H) 9/29/2010    Overview:  Was diagnosed at age 4 years old per patient's report. At 7/1990 right after pregnancy she underwent liver transplant.      Liver transplant 1990 and 2012     2 transplants-[facility] ,1-2 x year     MDD (major depressive disorder)     On Abilify and buspar     Obesity (BMI 35.0-39.9)  with comorbidity (H) 2019     Raynaud phenomenon 2013     S/P coronary angiogram 10/10/2019    PAWAN to second diagonal     Scleroderma, diffuse (H) 2013     SOB (shortness of breath)      STEMI (ST elevation myocardial infarction) (H) 10/10/2019     Tobacco use        PAST SURGICAL HISTORY:  Past Surgical History:   Procedure Laterality Date     BIOPSY       CV CORONARY ANGIOGRAM N/A 10/10/2019    Procedure: Coronary Angiogram;  Surgeon: Rajendra De La Torre MD;  Location:  HEART CARDIAC CATH LAB     CV LEFT HEART CATH N/A 10/10/2019    Procedure: Left Heart Cath;  Surgeon: Rajednra De La Torre MD;  Location:  HEART CARDIAC CATH LAB     CV PCI STENT DRUG ELUTING N/A 10/10/2019    Procedure: PCI Stent Drug Eluting;  Surgeon: Rajendra De La Torre MD;  Location:  HEART CARDIAC CATH LAB     GI SURGERY       HEART CATH LEFT HEART CATH  10/10/2019    Successful PCI of the D2 with a [id number] Synergy drug eluting stent.     RETURN LIVER TRANSPLANT      21 years ago     TRANSPLANT         FAMILY HISTORY:  Family History   Problem Relation Age of Onset     Myocardial Infarction Mother 57     Hypertension Mother      Substance Abuse Sister        SOCIAL HISTORY:  Social History     Socioeconomic History     Marital status:      Spouse name: None     Number of children: None     Years of education: None     Highest education level: None   Occupational History     None   Social Needs     Financial resource strain: None     Food insecurity:     Worry: None     Inability: None     Transportation needs:     Medical: None     Non-medical: None   Tobacco Use     Smoking status: Former Smoker     Packs/day: 0.30     Years: 30.00     Pack years: 9.00     Types: Cigarettes     Last attempt to quit: 10/30/2012     Years since quittin.0     Smokeless tobacco: Never Used   Substance and Sexual Activity     Alcohol use: No     Alcohol/week: 0.0 standard drinks     Drug use: No     Types: Marijuana      "Sexual activity: Yes     Partners: Male   Lifestyle     Physical activity:     Days per week: None     Minutes per session: None     Stress: None   Relationships     Social connections:     Talks on phone: None     Gets together: None     Attends Shinto service: None     Active member of club or organization: None     Attends meetings of clubs or organizations: None     Relationship status: None     Intimate partner violence:     Fear of current or ex partner: None     Emotionally abused: None     Physically abused: None     Forced sexual activity: None   Other Topics Concern     Parent/sibling w/ CABG, MI or angioplasty before 65F 55M? Yes   Social History Narrative     None       Review of Systems:  Skin:  Negative       Eyes:  Positive for glasses;visual blurring    ENT:  Negative      Respiratory:  Positive for dyspnea on exertion;shortness of breath     Cardiovascular:  Negative;dizziness;lightheadedness;syncope or near-syncope;cyanosis;edema;fatigue;exercise intolerance Positive for;palpitations;chest pain    Gastroenterology: Positive for constipation    Genitourinary:  Negative      Musculoskeletal:  Positive for fibromyalgia    Neurologic:  Positive for headaches;migraine headaches;numbness or tingling of hands    Psychiatric:  Positive for sleep disturbances;anxiety;depression    Heme/Lymph/Imm:  Positive for allergies    Endocrine:  Negative        Physical Exam:  Vitals: /84 (BP Location: Left arm, Cuff Size: Adult Large)   Pulse 66   Ht 1.575 m (5' 2\")   Wt 81.3 kg (179 lb 3.2 oz)   BMI 32.78 kg/m       Constitutional:  cooperative, alert and oriented, well developed, well nourished, in no acute distress        Skin:  warm and dry to the touch          Head:  normocephalic        Eyes:           Lymph:      ENT:  no pallor or cyanosis        Neck:  carotid pulses are full and equal bilaterally, JVP normal, no carotid bruit        Respiratory:  normal breath sounds, clear to auscultation, " normal A-P diameter, normal symmetry, normal respiratory excursion, no use of accessory muscles         Cardiac: regular rhythm, normal S1/S2, no S3 or S4, apical impulse not displaced, no murmurs, gallops or rubs                pulses full and equal     right radial artery;2+             left radial artery;2+                    GI:  abdomen soft;non-tender        Extremities and Muscular Skeletal:  no edema;no deformities, clubbing, cyanosis, erythema observed              Neurological:  no gross motor deficits;affect appropriate        Psych:  Alert and Oriented x 3    Encounter Diagnoses   Name Primary?     Hyperlipidemia LDL goal <70      Coronary artery disease involving native coronary artery of native heart without angina pectoris      Atypical chest pain      Ischemic cardiomyopathy        Recent Lab Results:  LIPID RESULTS:  Lab Results   Component Value Date    CHOL 142 10/18/2019    HDL 45 (L) 10/18/2019    LDL 70 10/18/2019    TRIG 133 10/18/2019       LIVER ENZYME RESULTS:  Lab Results   Component Value Date    AST 12 10/18/2019    ALT <5 (L) 10/18/2019       CBC RESULTS:  Lab Results   Component Value Date    WBC 8.8 10/11/2019    RBC 5.18 10/11/2019    HGB 14.6 10/11/2019    HCT 43.8 10/11/2019    MCV 85 10/11/2019    MCH 28.2 10/11/2019    MCHC 33.3 10/11/2019    RDW 14.3 10/11/2019     10/11/2019       BMP RESULTS:  Lab Results   Component Value Date     11/05/2019    POTASSIUM 4.5 11/05/2019    CHLORIDE 104 11/05/2019    CO2 25 11/05/2019    ANIONGAP 13.5 11/05/2019     11/05/2019    BUN 14 11/05/2019    CR 1.14 11/05/2019    GFRESTIMATED 50 (L) 11/05/2019    GFRESTBLACK 61 11/05/2019    LANDEN 9.9 11/05/2019        A1C RESULTS:  Lab Results   Component Value Date    A1C 4.5 02/22/2012       INR RESULTS:  Lab Results   Component Value Date    INR 0.87 10/10/2019    INR 1.0 07/06/2015             Thank you for allowing me to participate in the care of your patient.    Sincerely,      HOLDEN Green CNP     [facility]

## 2019-11-12 NOTE — LETTER
11/12/2019    Loyd Morley MD  [address]  [facility] [address]    RE: Nani Ford       Dear Colleague,    I had the pleasure of seeing Nani Ford in the [facility].    HPI and Plan:   I had the pleasure of seeing Nani Ford today at [facility] for evaluation of coronary artery disease. She is a pleasant 54 year old patient of Dr. Desouza.     Ms. Ford has a past medical history significant for coronary artery disease status post STEMI, resolved ischemic cardiomyopathy, hypertension, hyperlipidemia, chronic kidney disease status post kidney in 2012, history of liver transplant x2 in 1990 and again in 2012, fibromyalgia, anxiety, depression, and cannabis dependence.     The patient was recently admitted to [facility] with complaints of chest pain.  Troponin elevated at 1.5.  EKG showed sinus tachycardia with ST segment elevation in leads I, aVL, V2, V3, V4, V5, V6.  There is reciprocal depression in leads II, with 3 and aVF.  She urgently underwent a coronary angiography for an acute anterolateral wall ST segment elevation myocardial infarction.  Single-vessel obstructive coronary artery disease was noted of the second obtuse marginal mild nonobstructive disease elsewhere.  Successful PCI with a drug-eluting stent to the second diagonal.  She was appropriately initiated on aspirin lifelong and Brilinta for 1 year uninterrupted.  Echocardiogram showed a mildly reduced left ventricular systolic function with an ejection fraction of 40 to 45% with apical, anterior, distal anterolateral and anteroseptal akinesis.  She was then started on metoprolol tartrate for her ischemic cardiomyopathy.    Following discharge, she was seen in clinic by DIOMEDES Jasso.  During this visit, the patient's heart rates were noted to be significantly low.  She was transitioned from metoprolol tartrate to metoprolol succinate 50 mg  daily.  Lisinopril was not initiated due to elevated renal function.  The patient noted ongoing shortness of breath.  The Brilinta was transition to Plavix with a loading dose.  A CRP and sed rate was completed in addition to a limited echocardiogram to rule out pericarditis.      CRP and sed rate were within normal limits.  Limited echocardiogram showed an improvement of her left ventricular systolic function with an ejection fraction at 55 to 60% (previously 40 to 45%).  Left ventricular diastolic function was normal.  Right ventricle was normal.  No hemodynamically significant valvular abnormalities or wall motion abnormalities noted.    Today she presents the cardiology clinic to review the above testing.  She is doing well with significant improvement in her shortness of breath.  She denies chest pain.  She started cardiac rehab however she is not interested in completing the program as she feels she is above the level of activity being assigned to her.  She remains active at home with her chores however no activity to increase her heart rate for a given amount of time.  She is tolerating her medications without side effects.    Physical Exam  Please see Below     Assessment and Plan  1. Coronary artery disease status post recent STEMI with a drug-eluting stent to the second diagonal.  She continues to do well without recurrent angina.  She continues on aspirin lifelong and Plavix for 1 year uninterrupted.  She continues on atorvastatin without side effects.  I have discussed with her at length today the benefits of cardiac rehab however, she is not interested at this time.  She is scheduled to follow-up with Dr. Desouza in December 2018.    2. Hyperlipidemia.  LDL 70.  Continue atorvastatin.    3.  Ischemic cardiomyopathy, resolved.  LVEF 55 to 60%.  She appears euvolemic.  Continue metoprolol XL.    4.  Hypertension.  Well-controlled.  Continue current regimen.    5. History of kidney transplant and liver  transplant x2.  Avoid nephrotoxic drugs.  Followed by the [facility].    Thank you for allowing me to care for Nani Ford today.    HOLDEN Green, CNP  Cardiology    Voice recognition software was used for this note, I have reviewed this note, but errors may have been missed.    No orders of the defined types were placed in this encounter.    No orders of the defined types were placed in this encounter.    There are no discontinued medications.      CURRENT MEDICATIONS:  Current Outpatient Medications   Medication Sig Dispense Refill     amLODIPine (NORVASC) 5 MG tablet TAKE 1 TABLET DAILY 90 tablet 4     ARIPiprazole (ABILIFY) 2 MG tablet Take 1 tablet (2 mg) by mouth daily 30 tablet 0     ARIPiprazole (ABILIFY) 5 MG tablet Take 1 tablet (5 mg) by mouth daily 30 tablet 0     aspirin 81 MG tablet Take 81 mg by mouth daily.       atorvastatin (LIPITOR) 40 MG tablet Take 1 tablet (40 mg) by mouth daily 30 tablet 0     butalbital-acetaminophen-caffeine (FIORICET/ESGIC) -40 MG tablet TAKE ONE TABLET BY MOUTH EVERY 4 HOURS AS NEEDED FOR HEADACHES 30 tablet 3     Calcium Carb-Cholecalciferol 600-800 MG-UNIT TABS Take 1 tablet by mouth       clopidogrel (PLAVIX) 75 MG tablet Take 4 tablets (300 mg) on day 1 and after this one tablet (75 mg) once a day. 90 tablet 3     DULoxetine (CYMBALTA) 60 MG capsule Take 60 mg by mouth 2 times daily       metoprolol succinate ER (TOPROL-XL) 50 MG 24 hr tablet Take 1 tablet (50 mg) by mouth daily 30 tablet 3     nitroGLYcerin (NITROSTAT) 0.4 MG sublingual tablet For chest pain place 1 tablet under the tongue every 5 minutes for 3 doses. If symptoms persist 5 minutes after 1st dose call 911. 20 tablet 0     omeprazole (PRILOSEC) 40 MG DR capsule Take 40 mg by mouth 2 times daily       omeprazole (PRILOSEC) 40 MG DR capsule TAKE 1 CAPSULE(40 MG) BY MOUTH TWICE DAILY 180 capsule 3     predniSONE (DELTASONE) 5 MG tablet Take 5 mg by mouth every morning       tacrolimus  (GENERIC EQUIVALENT) 0.5 MG capsule Managed by pt's transplant team at [facility]  0     tacrolimus (GENERIC EQUIVALENT) 1 MG capsule Rx managed by transplant team.       zolpidem (AMBIEN) 10 MG tablet TAKE 1 TABLET BY MOUTH AT BEDTIME 30 tablet 0     busPIRone (BUSPAR) 5 MG tablet Take 1 tablet (5 mg) by mouth 3 times daily as needed (Patient not taking: Reported on 11/12/2019) 60 tablet 1     mycophenolate (CELLCEPT - GENERIC EQUIVALENT) 250 MG capsule   3       ALLERGIES     Allergies   Allergen Reactions     Acetaminophen-Codeine      Other reaction(s): GI intolerance     Chlorhexidine Other (See Comments)     burning     Codeine Other (See Comments)     Codeine Sulfate Nausea and Vomiting     Diatrizoate Other (See Comments)     Patient has a transplanted kidney so dye should be with caution if at all.  Patient has a transplanted kidney so dye should be with caution if at all.     Hydrocodone-Acetaminophen Other (See Comments)     doesn't work     Hydromorphone Nausea and Vomiting       PAST MEDICAL HISTORY:  Past Medical History:   Diagnosis Date     Anemia 8/22/2013     Anxiety      Atypical chest pain      CAD (coronary artery disease)      Cannabis dependence (H)      Chronic kidney disease, stage IV (severe) (H)     [facility]      Complication of transplanted kidney 11/29/2012     Fibromyalgia 10/7/2016     Former smoker      History of gout 2/12/2013     History of kidney transplant 12/17/2012     HTN (hypertension)      Hyperlipidaemia LDL goal < 100      Hyperlipidemia with target LDL less than 100      Insomnia, unspecified type 7/17/2018     Ischemic cardiomyopathy      Liver cell carcinoma (H) 9/29/2010    Overview:  Was diagnosed at age 4 years old per patient's report. At 7/1990 right after pregnancy she underwent liver transplant.      Liver transplant 1990 and 2012     2 transplants-[facility] ,1-2 x year     MDD (major depressive disorder)     On Abilify and buspar     Obesity (BMI 35.0-39.9)  with comorbidity (H) 2019     Raynaud phenomenon 2013     S/P coronary angiogram 10/10/2019    PAWAN to second diagonal     Scleroderma, diffuse (H) 2013     SOB (shortness of breath)      STEMI (ST elevation myocardial infarction) (H) 10/10/2019     Tobacco use        PAST SURGICAL HISTORY:  Past Surgical History:   Procedure Laterality Date     BIOPSY       CV CORONARY ANGIOGRAM N/A 10/10/2019    Procedure: Coronary Angiogram;  Surgeon: Rajendra De La Torre MD;  Location:  HEART CARDIAC CATH LAB     CV LEFT HEART CATH N/A 10/10/2019    Procedure: Left Heart Cath;  Surgeon: Rajendra De La Torre MD;  Location:  HEART CARDIAC CATH LAB     CV PCI STENT DRUG ELUTING N/A 10/10/2019    Procedure: PCI Stent Drug Eluting;  Surgeon: Rajendra De La Torre MD;  Location:  HEART CARDIAC CATH LAB     GI SURGERY       HEART CATH LEFT HEART CATH  10/10/2019    Successful PCI of the D2 with a [id number] Synergy drug eluting stent.     RETURN LIVER TRANSPLANT      21 years ago     TRANSPLANT         FAMILY HISTORY:  Family History   Problem Relation Age of Onset     Myocardial Infarction Mother 57     Hypertension Mother      Substance Abuse Sister        SOCIAL HISTORY:  Social History     Socioeconomic History     Marital status:      Spouse name: None     Number of children: None     Years of education: None     Highest education level: None   Occupational History     None   Social Needs     Financial resource strain: None     Food insecurity:     Worry: None     Inability: None     Transportation needs:     Medical: None     Non-medical: None   Tobacco Use     Smoking status: Former Smoker     Packs/day: 0.30     Years: 30.00     Pack years: 9.00     Types: Cigarettes     Last attempt to quit: 10/30/2012     Years since quittin.0     Smokeless tobacco: Never Used   Substance and Sexual Activity     Alcohol use: No     Alcohol/week: 0.0 standard drinks     Drug use: No     Types: Marijuana      "Sexual activity: Yes     Partners: Male   Lifestyle     Physical activity:     Days per week: None     Minutes per session: None     Stress: None   Relationships     Social connections:     Talks on phone: None     Gets together: None     Attends Congregational service: None     Active member of club or organization: None     Attends meetings of clubs or organizations: None     Relationship status: None     Intimate partner violence:     Fear of current or ex partner: None     Emotionally abused: None     Physically abused: None     Forced sexual activity: None   Other Topics Concern     Parent/sibling w/ CABG, MI or angioplasty before 65F 55M? Yes   Social History Narrative     None       Review of Systems:  Skin:  Negative       Eyes:  Positive for glasses;visual blurring    ENT:  Negative      Respiratory:  Positive for dyspnea on exertion;shortness of breath     Cardiovascular:  Negative;dizziness;lightheadedness;syncope or near-syncope;cyanosis;edema;fatigue;exercise intolerance Positive for;palpitations;chest pain    Gastroenterology: Positive for constipation    Genitourinary:  Negative      Musculoskeletal:  Positive for fibromyalgia    Neurologic:  Positive for headaches;migraine headaches;numbness or tingling of hands    Psychiatric:  Positive for sleep disturbances;anxiety;depression    Heme/Lymph/Imm:  Positive for allergies    Endocrine:  Negative        Physical Exam:  Vitals: /84 (BP Location: Left arm, Cuff Size: Adult Large)   Pulse 66   Ht 1.575 m (5' 2\")   Wt 81.3 kg (179 lb 3.2 oz)   BMI 32.78 kg/m       Constitutional:  cooperative, alert and oriented, well developed, well nourished, in no acute distress        Skin:  warm and dry to the touch          Head:  normocephalic        Eyes:           Lymph:      ENT:  no pallor or cyanosis        Neck:  carotid pulses are full and equal bilaterally, JVP normal, no carotid bruit        Respiratory:  normal breath sounds, clear to auscultation, " normal A-P diameter, normal symmetry, normal respiratory excursion, no use of accessory muscles         Cardiac: regular rhythm, normal S1/S2, no S3 or S4, apical impulse not displaced, no murmurs, gallops or rubs                pulses full and equal     right radial artery;2+             left radial artery;2+                    GI:  abdomen soft;non-tender        Extremities and Muscular Skeletal:  no edema;no deformities, clubbing, cyanosis, erythema observed              Neurological:  no gross motor deficits;affect appropriate        Psych:  Alert and Oriented x 3    Encounter Diagnoses   Name Primary?     Hyperlipidemia LDL goal <70      Coronary artery disease involving native coronary artery of native heart without angina pectoris      Atypical chest pain      Ischemic cardiomyopathy        Recent Lab Results:  LIPID RESULTS:  Lab Results   Component Value Date    CHOL 142 10/18/2019    HDL 45 (L) 10/18/2019    LDL 70 10/18/2019    TRIG 133 10/18/2019       LIVER ENZYME RESULTS:  Lab Results   Component Value Date    AST 12 10/18/2019    ALT <5 (L) 10/18/2019       CBC RESULTS:  Lab Results   Component Value Date    WBC 8.8 10/11/2019    RBC 5.18 10/11/2019    HGB 14.6 10/11/2019    HCT 43.8 10/11/2019    MCV 85 10/11/2019    MCH 28.2 10/11/2019    MCHC 33.3 10/11/2019    RDW 14.3 10/11/2019     10/11/2019       BMP RESULTS:  Lab Results   Component Value Date     11/05/2019    POTASSIUM 4.5 11/05/2019    CHLORIDE 104 11/05/2019    CO2 25 11/05/2019    ANIONGAP 13.5 11/05/2019     11/05/2019    BUN 14 11/05/2019    CR 1.14 11/05/2019    GFRESTIMATED 50 (L) 11/05/2019    GFRESTBLACK 61 11/05/2019    LANDEN 9.9 11/05/2019        A1C RESULTS:  Lab Results   Component Value Date    A1C 4.5 02/22/2012       INR RESULTS:  Lab Results   Component Value Date    INR 0.87 10/10/2019    INR 1.0 07/06/2015           RAMANDEEP JonesC  [address] [place]  [address]                      Thank you for allowing me to participate in the care of your patient.      Sincerely,     HOLDEN Green [facility]    cc:   Romero Sapp PA-C  [address] [place] [address]

## 2019-11-12 NOTE — PROGRESS NOTES
HPI and Plan:   I had the pleasure of seeing Nani Ford today at [facility] for evaluation of coronary artery disease. She is a pleasant 54 year old patient of Dr. Desouza.     Ms. Ford has a past medical history significant for coronary artery disease status post STEMI, resolved ischemic cardiomyopathy, hypertension, hyperlipidemia, chronic kidney disease status post kidney in 2012, history of liver transplant x2 in 1990 and again in 2012, fibromyalgia, anxiety, depression, and cannabis dependence.     The patient was recently admitted to [facility] with complaints of chest pain.  Troponin elevated at 1.5.  EKG showed sinus tachycardia with ST segment elevation in leads I, aVL, V2, V3, V4, V5, V6.  There is reciprocal depression in leads II, with 3 and aVF.  She urgently underwent a coronary angiography for an acute anterolateral wall ST segment elevation myocardial infarction.  Single-vessel obstructive coronary artery disease was noted of the second obtuse marginal mild nonobstructive disease elsewhere.  Successful PCI with a drug-eluting stent to the second diagonal.  She was appropriately initiated on aspirin lifelong and Brilinta for 1 year uninterrupted.  Echocardiogram showed a mildly reduced left ventricular systolic function with an ejection fraction of 40 to 45% with apical, anterior, distal anterolateral and anteroseptal akinesis.  She was then started on metoprolol tartrate for her ischemic cardiomyopathy.    Following discharge, she was seen in clinic by DIOMEDES Jasso.  During this visit, the patient's heart rates were noted to be significantly low.  She was transitioned from metoprolol tartrate to metoprolol succinate 50 mg daily.  Lisinopril was not initiated due to elevated renal function.  The patient noted ongoing shortness of breath.  The Brilinta was transition to Plavix with a loading dose.  A CRP and sed rate was completed in addition to a  limited echocardiogram to rule out pericarditis.      CRP and sed rate were within normal limits.  Limited echocardiogram showed an improvement of her left ventricular systolic function with an ejection fraction at 55 to 60% (previously 40 to 45%).  Left ventricular diastolic function was normal.  Right ventricle was normal.  No hemodynamically significant valvular abnormalities or wall motion abnormalities noted.    Today she presents the cardiology clinic to review the above testing.  She is doing well with significant improvement in her shortness of breath.  She denies chest pain.  She started cardiac rehab however she is not interested in completing the program as she feels she is above the level of activity being assigned to her.  She remains active at home with her chores however no activity to increase her heart rate for a given amount of time.  She is tolerating her medications without side effects.    Physical Exam  Please see Below     Assessment and Plan  1. Coronary artery disease status post recent STEMI with a drug-eluting stent to the second diagonal.  She continues to do well without recurrent angina.  She continues on aspirin lifelong and Plavix for 1 year uninterrupted.  She continues on atorvastatin without side effects.  I have discussed with her at length today the benefits of cardiac rehab however, she is not interested at this time.  She is scheduled to follow-up with Dr. Desouza in December 2018.    2. Hyperlipidemia.  LDL 70.  Continue atorvastatin.    3.  Ischemic cardiomyopathy, resolved.  LVEF 55 to 60%.  She appears euvolemic.  Continue metoprolol XL.    4.  Hypertension.  Well-controlled.  Continue current regimen.    5. History of kidney transplant and liver transplant x2.  Avoid nephrotoxic drugs.  Followed by the [facility].    Thank you for allowing me to care for Nani Ford today.    HOLDEN Green, CNP  Cardiology    Voice recognition software was used for this note,  I have reviewed this note, but errors may have been missed.    No orders of the defined types were placed in this encounter.    No orders of the defined types were placed in this encounter.    There are no discontinued medications.      CURRENT MEDICATIONS:  Current Outpatient Medications   Medication Sig Dispense Refill     amLODIPine (NORVASC) 5 MG tablet TAKE 1 TABLET DAILY 90 tablet 4     ARIPiprazole (ABILIFY) 2 MG tablet Take 1 tablet (2 mg) by mouth daily 30 tablet 0     ARIPiprazole (ABILIFY) 5 MG tablet Take 1 tablet (5 mg) by mouth daily 30 tablet 0     aspirin 81 MG tablet Take 81 mg by mouth daily.       atorvastatin (LIPITOR) 40 MG tablet Take 1 tablet (40 mg) by mouth daily 30 tablet 0     butalbital-acetaminophen-caffeine (FIORICET/ESGIC) -40 MG tablet TAKE ONE TABLET BY MOUTH EVERY 4 HOURS AS NEEDED FOR HEADACHES 30 tablet 3     Calcium Carb-Cholecalciferol 600-800 MG-UNIT TABS Take 1 tablet by mouth       clopidogrel (PLAVIX) 75 MG tablet Take 4 tablets (300 mg) on day 1 and after this one tablet (75 mg) once a day. 90 tablet 3     DULoxetine (CYMBALTA) 60 MG capsule Take 60 mg by mouth 2 times daily       metoprolol succinate ER (TOPROL-XL) 50 MG 24 hr tablet Take 1 tablet (50 mg) by mouth daily 30 tablet 3     nitroGLYcerin (NITROSTAT) 0.4 MG sublingual tablet For chest pain place 1 tablet under the tongue every 5 minutes for 3 doses. If symptoms persist 5 minutes after 1st dose call 911. 20 tablet 0     omeprazole (PRILOSEC) 40 MG DR capsule Take 40 mg by mouth 2 times daily       omeprazole (PRILOSEC) 40 MG DR capsule TAKE 1 CAPSULE(40 MG) BY MOUTH TWICE DAILY 180 capsule 3     predniSONE (DELTASONE) 5 MG tablet Take 5 mg by mouth every morning       tacrolimus (GENERIC EQUIVALENT) 0.5 MG capsule Managed by pt's transplant team at [facility]  0     tacrolimus (GENERIC EQUIVALENT) 1 MG capsule Rx managed by transplant team.       zolpidem (AMBIEN) 10 MG tablet TAKE 1 TABLET BY MOUTH AT BEDTIME 30  tablet 0     busPIRone (BUSPAR) 5 MG tablet Take 1 tablet (5 mg) by mouth 3 times daily as needed (Patient not taking: Reported on 11/12/2019) 60 tablet 1     mycophenolate (CELLCEPT - GENERIC EQUIVALENT) 250 MG capsule   3       ALLERGIES     Allergies   Allergen Reactions     Acetaminophen-Codeine      Other reaction(s): GI intolerance     Chlorhexidine Other (See Comments)     burning     Codeine Other (See Comments)     Codeine Sulfate Nausea and Vomiting     Diatrizoate Other (See Comments)     Patient has a transplanted kidney so dye should be with caution if at all.  Patient has a transplanted kidney so dye should be with caution if at all.     Hydrocodone-Acetaminophen Other (See Comments)     doesn't work     Hydromorphone Nausea and Vomiting       PAST MEDICAL HISTORY:  Past Medical History:   Diagnosis Date     Anemia 8/22/2013     Anxiety      Atypical chest pain      CAD (coronary artery disease)      Cannabis dependence (H)      Chronic kidney disease, stage IV (severe) (H)     [facility] fu      Complication of transplanted kidney 11/29/2012     Fibromyalgia 10/7/2016     Former smoker      History of gout 2/12/2013     History of kidney transplant 12/17/2012     HTN (hypertension)      Hyperlipidaemia LDL goal < 100      Hyperlipidemia with target LDL less than 100      Insomnia, unspecified type 7/17/2018     Ischemic cardiomyopathy      Liver cell carcinoma (H) 9/29/2010    Overview:  Was diagnosed at age 4 years old per patient's report. At 7/1990 right after pregnancy she underwent liver transplant.      Liver transplant 1990 and 2012     2 transplants-[facility] fu ,1-2 x year     MDD (major depressive disorder)     On Abilify and buspar     Obesity (BMI 35.0-39.9) with comorbidity (H) 2/13/2019     Raynaud phenomenon 8/22/2013     S/P coronary angiogram 10/10/2019    PAWAN to second diagonal     Scleroderma, diffuse (H) 8/22/2013     SOB (shortness of breath)      STEMI (ST elevation myocardial  infarction) (H) 10/10/2019     Tobacco use        PAST SURGICAL HISTORY:  Past Surgical History:   Procedure Laterality Date     BIOPSY       CV CORONARY ANGIOGRAM N/A 10/10/2019    Procedure: Coronary Angiogram;  Surgeon: Rajendra De La Torre MD;  Location:  HEART CARDIAC CATH LAB     CV LEFT HEART CATH N/A 10/10/2019    Procedure: Left Heart Cath;  Surgeon: Rajendra De La Torre MD;  Location:  HEART CARDIAC CATH LAB     CV PCI STENT DRUG ELUTING N/A 10/10/2019    Procedure: PCI Stent Drug Eluting;  Surgeon: Rajendra De La Torre MD;  Location:  HEART CARDIAC CATH LAB     GI SURGERY       HEART CATH LEFT HEART CATH  10/10/2019    Successful PCI of the D2 with a [id number] Synergy drug eluting stent.     RETURN LIVER TRANSPLANT      21 years ago     TRANSPLANT         FAMILY HISTORY:  Family History   Problem Relation Age of Onset     Myocardial Infarction Mother 57     Hypertension Mother      Substance Abuse Sister        SOCIAL HISTORY:  Social History     Socioeconomic History     Marital status:      Spouse name: None     Number of children: None     Years of education: None     Highest education level: None   Occupational History     None   Social Needs     Financial resource strain: None     Food insecurity:     Worry: None     Inability: None     Transportation needs:     Medical: None     Non-medical: None   Tobacco Use     Smoking status: Former Smoker     Packs/day: 0.30     Years: 30.00     Pack years: 9.00     Types: Cigarettes     Last attempt to quit: 10/30/2012     Years since quittin.0     Smokeless tobacco: Never Used   Substance and Sexual Activity     Alcohol use: No     Alcohol/week: 0.0 standard drinks     Drug use: No     Types: Marijuana     Sexual activity: Yes     Partners: Male   Lifestyle     Physical activity:     Days per week: None     Minutes per session: None     Stress: None   Relationships     Social connections:     Talks on phone: None     Gets together: None     " Attends Restoration[employer] service: None     Active member of club or organization: None     Attends meetings of clubs or organizations: None     Relationship status: None     Intimate partner violence:     Fear of current or ex partner: None     Emotionally abused: None     Physically abused: None     Forced sexual activity: None   Other Topics Concern     Parent/sibling w/ CABG, MI or angioplasty before 65F 55M? Yes   Social History Narrative     None       Review of Systems:  Skin:  Negative       Eyes:  Positive for glasses;visual blurring    ENT:  Negative      Respiratory:  Positive for dyspnea on exertion;shortness of breath     Cardiovascular:  Negative;dizziness;lightheadedness;syncope or near-syncope;cyanosis;edema;fatigue;exercise intolerance Positive for;palpitations;chest pain    Gastroenterology: Positive for constipation    Genitourinary:  Negative      Musculoskeletal:  Positive for fibromyalgia    Neurologic:  Positive for headaches;migraine headaches;numbness or tingling of hands    Psychiatric:  Positive for sleep disturbances;anxiety;depression    Heme/Lymph/Imm:  Positive for allergies    Endocrine:  Negative        Physical Exam:  Vitals: /84 (BP Location: Left arm, Cuff Size: Adult Large)   Pulse 66   Ht 1.575 m (5' 2\")   Wt 81.3 kg (179 lb 3.2 oz)   BMI 32.78 kg/m      Constitutional:  cooperative, alert and oriented, well developed, well nourished, in no acute distress        Skin:  warm and dry to the touch          Head:  normocephalic        Eyes:           Lymph:      ENT:  no pallor or cyanosis        Neck:  carotid pulses are full and equal bilaterally, JVP normal, no carotid bruit        Respiratory:  normal breath sounds, clear to auscultation, normal A-P diameter, normal symmetry, normal respiratory excursion, no use of accessory muscles         Cardiac: regular rhythm, normal S1/S2, no S3 or S4, apical impulse not displaced, no murmurs, gallops or rubs                pulses full " and equal     right radial artery;2+             left radial artery;2+                    GI:  abdomen soft;non-tender        Extremities and Muscular Skeletal:  no edema;no deformities, clubbing, cyanosis, erythema observed              Neurological:  no gross motor deficits;affect appropriate        Psych:  Alert and Oriented x 3    Encounter Diagnoses   Name Primary?     Hyperlipidemia LDL goal <70      Coronary artery disease involving native coronary artery of native heart without angina pectoris      Atypical chest pain      Ischemic cardiomyopathy        Recent Lab Results:  LIPID RESULTS:  Lab Results   Component Value Date    CHOL 142 10/18/2019    HDL 45 (L) 10/18/2019    LDL 70 10/18/2019    TRIG 133 10/18/2019       LIVER ENZYME RESULTS:  Lab Results   Component Value Date    AST 12 10/18/2019    ALT <5 (L) 10/18/2019       CBC RESULTS:  Lab Results   Component Value Date    WBC 8.8 10/11/2019    RBC 5.18 10/11/2019    HGB 14.6 10/11/2019    HCT 43.8 10/11/2019    MCV 85 10/11/2019    MCH 28.2 10/11/2019    MCHC 33.3 10/11/2019    RDW 14.3 10/11/2019     10/11/2019       BMP RESULTS:  Lab Results   Component Value Date     11/05/2019    POTASSIUM 4.5 11/05/2019    CHLORIDE 104 11/05/2019    CO2 25 11/05/2019    ANIONGAP 13.5 11/05/2019     11/05/2019    BUN 14 11/05/2019    CR 1.14 11/05/2019    GFRESTIMATED 50 (L) 11/05/2019    GFRESTBLACK 61 11/05/2019    LANDEN 9.9 11/05/2019        A1C RESULTS:  Lab Results   Component Value Date    A1C 4.5 02/22/2012       INR RESULTS:  Lab Results   Component Value Date    INR 0.87 10/10/2019    INR 1.0 07/06/2015           CC  RAMANDEEP RiveraC  [address] [place] [address]

## 2019-11-17 DIAGNOSIS — G47.01 INSOMNIA DUE TO MEDICAL CONDITION: ICD-10-CM

## 2019-11-18 NOTE — TELEPHONE ENCOUNTER
Refill medication Ambien  LOV 11/01/2019  Danielle Tobin MA on 11/18/2019 at 10:15 AM    Ambien request from Pharmacy.   Savannah Evangelista CMA on 11/21/2019 at 8:59 AM

## 2019-11-21 RX ORDER — ZOLPIDEM TARTRATE 10 MG/1
TABLET ORAL
Qty: 30 TABLET | Refills: 0 | Status: SHIPPED | OUTPATIENT
Start: 2019-11-21 | End: 2019-12-24

## 2019-12-02 DIAGNOSIS — F33.1 MAJOR DEPRESSIVE DISORDER, RECURRENT EPISODE, MODERATE (H): ICD-10-CM

## 2019-12-02 DIAGNOSIS — R51.9 NONINTRACTABLE HEADACHE, UNSPECIFIED CHRONICITY PATTERN, UNSPECIFIED HEADACHE TYPE: ICD-10-CM

## 2019-12-03 RX ORDER — ARIPIPRAZOLE 5 MG/1
5 TABLET ORAL DAILY
Qty: 30 TABLET | Refills: 0 | Status: SHIPPED | OUTPATIENT
Start: 2019-12-03 | End: 2019-12-31

## 2019-12-03 RX ORDER — BUTALBITAL, ACETAMINOPHEN AND CAFFEINE 50; 325; 40 MG/1; MG/1; MG/1
TABLET ORAL
Qty: 30 TABLET | Refills: 3 | Status: SHIPPED | OUTPATIENT
Start: 2019-12-03 | End: 2019-12-23

## 2019-12-18 ENCOUNTER — OFFICE VISIT (OUTPATIENT)
Dept: PSYCHOLOGY | Facility: CLINIC | Age: 54
End: 2019-12-18
Payer: COMMERCIAL

## 2019-12-18 DIAGNOSIS — F43.10 PTSD (POST-TRAUMATIC STRESS DISORDER): Primary | ICD-10-CM

## 2019-12-18 PROCEDURE — 90791 PSYCH DIAGNOSTIC EVALUATION: CPT

## 2019-12-18 ASSESSMENT — ANXIETY QUESTIONNAIRES
IF YOU CHECKED OFF ANY PROBLEMS ON THIS QUESTIONNAIRE, HOW DIFFICULT HAVE THESE PROBLEMS MADE IT FOR YOU TO DO YOUR WORK, TAKE CARE OF THINGS AT HOME, OR GET ALONG WITH OTHER PEOPLE: EXTREMELY DIFFICULT
7. FEELING AFRAID AS IF SOMETHING AWFUL MIGHT HAPPEN: NEARLY EVERY DAY
5. BEING SO RESTLESS THAT IT IS HARD TO SIT STILL: NEARLY EVERY DAY
6. BECOMING EASILY ANNOYED OR IRRITABLE: NEARLY EVERY DAY
1. FEELING NERVOUS, ANXIOUS, OR ON EDGE: NEARLY EVERY DAY
GAD7 TOTAL SCORE: 21
2. NOT BEING ABLE TO STOP OR CONTROL WORRYING: NEARLY EVERY DAY
4. TROUBLE RELAXING: NEARLY EVERY DAY
3. WORRYING TOO MUCH ABOUT DIFFERENT THINGS: NEARLY EVERY DAY

## 2019-12-18 ASSESSMENT — COLUMBIA-SUICIDE SEVERITY RATING SCALE - C-SSRS
3. HAVE YOU BEEN THINKING ABOUT HOW YOU MIGHT KILL YOURSELF?: NO
4. HAVE YOU HAD THESE THOUGHTS AND HAD SOME INTENTION OF ACTING ON THEM?: NO
2. HAVE YOU ACTUALLY HAD ANY THOUGHTS OF KILLING YOURSELF?: NO
4. HAVE YOU HAD THESE THOUGHTS AND HAD SOME INTENTION OF ACTING ON THEM?: NO
2. HAVE YOU ACTUALLY HAD ANY THOUGHTS OF KILLING YOURSELF LIFETIME?: NO
5. HAVE YOU STARTED TO WORK OUT OR WORKED OUT THE DETAILS OF HOW TO KILL YOURSELF? DO YOU INTEND TO CARRY OUT THIS PLAN?: NO
5. HAVE YOU STARTED TO WORK OUT OR WORKED OUT THE DETAILS OF HOW TO KILL YOURSELF? DO YOU INTEND TO CARRY OUT THIS PLAN?: NO
1. IN THE PAST MONTH, HAVE YOU WISHED YOU WERE DEAD OR WISHED YOU COULD GO TO SLEEP AND NOT WAKE UP?: YES
1. IN THE PAST MONTH, HAVE YOU WISHED YOU WERE DEAD OR WISHED YOU COULD GO TO SLEEP AND NOT WAKE UP?: YES

## 2019-12-18 ASSESSMENT — PATIENT HEALTH QUESTIONNAIRE - PHQ9: SUM OF ALL RESPONSES TO PHQ QUESTIONS 1-9: 25
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
## 2019-12-19 DIAGNOSIS — F33.1 MAJOR DEPRESSIVE DISORDER, RECURRENT EPISODE, MODERATE (H): Primary | ICD-10-CM

## 2019-12-19 ASSESSMENT — ANXIETY QUESTIONNAIRES: GAD7 TOTAL SCORE: 21

## 2019-12-23 DIAGNOSIS — R51.9 NONINTRACTABLE HEADACHE, UNSPECIFIED CHRONICITY PATTERN, UNSPECIFIED HEADACHE TYPE: ICD-10-CM

## 2019-12-23 RX ORDER — BUTALBITAL, ACETAMINOPHEN AND CAFFEINE 50; 325; 40 MG/1; MG/1; MG/1
TABLET ORAL
Qty: 30 TABLET | Refills: 3 | Status: SHIPPED | OUTPATIENT
Start: 2019-12-23 | End: 2020-05-04

## 2019-12-24 DIAGNOSIS — G47.01 INSOMNIA DUE TO MEDICAL CONDITION: ICD-10-CM

## 2019-12-24 RX ORDER — ZOLPIDEM TARTRATE 10 MG/1
10 TABLET ORAL AT BEDTIME
Qty: 30 TABLET | Refills: 0 | Status: SHIPPED | OUTPATIENT
Start: 2019-12-24 | End: 2020-01-21

## 2019-12-27 ENCOUNTER — OFFICE VISIT (OUTPATIENT)
Dept: PSYCHOLOGY | Facility: CLINIC | Age: 54
End: 2019-12-27
Payer: COMMERCIAL

## 2019-12-27 DIAGNOSIS — F43.10 PTSD (POST-TRAUMATIC STRESS DISORDER): Primary | ICD-10-CM

## 2019-12-27 PROCEDURE — 90839 PSYTX CRISIS INITIAL 60 MIN: CPT
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
## 2019-12-31 DIAGNOSIS — F33.1 MAJOR DEPRESSIVE DISORDER, RECURRENT EPISODE, MODERATE (H): ICD-10-CM

## 2019-12-31 RX ORDER — ARIPIPRAZOLE 5 MG/1
TABLET ORAL
Qty: 30 TABLET | Refills: 0 | Status: SHIPPED | OUTPATIENT
Start: 2019-12-31 | End: 2020-05-04 | Stop reason: DRUGHIGH

## 2020-01-03 ENCOUNTER — OFFICE VISIT (OUTPATIENT)
Dept: PSYCHOLOGY | Facility: CLINIC | Age: 55
End: 2020-01-03
Payer: COMMERCIAL

## 2020-01-03 DIAGNOSIS — F43.10 PTSD (POST-TRAUMATIC STRESS DISORDER): Primary | ICD-10-CM

## 2020-01-03 PROCEDURE — 90834 PSYTX W PT 45 MINUTES: CPT
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
## 2020-01-05 DIAGNOSIS — K21.9 GASTROESOPHAGEAL REFLUX DISEASE WITHOUT ESOPHAGITIS: ICD-10-CM

## 2020-01-05 RX ORDER — OMEPRAZOLE 40 MG/1
CAPSULE, DELAYED RELEASE ORAL
Qty: 180 CAPSULE | Refills: 3 | Status: SHIPPED | OUTPATIENT
Start: 2020-01-05 | End: 2020-05-14

## 2020-01-17 ENCOUNTER — OFFICE VISIT (OUTPATIENT)
Dept: PSYCHOLOGY | Facility: CLINIC | Age: 55
End: 2020-01-17
Payer: COMMERCIAL

## 2020-01-17 DIAGNOSIS — F43.10 PTSD (POST-TRAUMATIC STRESS DISORDER): Primary | ICD-10-CM

## 2020-01-17 PROCEDURE — 90834 PSYTX W PT 45 MINUTES: CPT
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
## 2020-01-20 DIAGNOSIS — G47.01 INSOMNIA DUE TO MEDICAL CONDITION: ICD-10-CM

## 2020-01-21 RX ORDER — ZOLPIDEM TARTRATE 10 MG/1
TABLET ORAL
Qty: 30 TABLET | Refills: 0 | Status: SHIPPED | OUTPATIENT
Start: 2020-01-21 | End: 2020-03-31

## 2020-02-12 ENCOUNTER — TELEPHONE (OUTPATIENT)
Dept: PSYCHOLOGY | Facility: CLINIC | Age: 55
End: 2020-02-12

## 2020-02-12 NOTE — TELEPHONE ENCOUNTER
Provider called client and left voice mail message inquiring about continued interest in MH services with [facility]. Reminded client of attendance policy agreement and requested she contact [facility] intake to r/s if still interested in pursuing services by 2/18/20. This is the 2nd consecutive no show by client who [facility]. Provider stated that if we do not hear back from client by that date, we will assume she is no longer interested in services and we will close her case in MH.

## 2020-02-14 DIAGNOSIS — E78.5 HYPERLIPIDEMIA LDL GOAL <70: ICD-10-CM

## 2020-02-14 RX ORDER — METOPROLOL SUCCINATE 50 MG/1
50 TABLET, EXTENDED RELEASE ORAL DAILY
Qty: 90 TABLET | Refills: 2 | Status: SHIPPED | OUTPATIENT
Start: 2020-02-14 | End: 2020-05-13

## 2020-02-14 NOTE — TELEPHONE ENCOUNTER
Refill request from [facility] for Metoprolol Succinate 50mg. LOV 11/12/19. 90 days supply with 2 refills sent. JULIANE Levine RN.

## 2020-02-23 ENCOUNTER — HEALTH MAINTENANCE LETTER (OUTPATIENT)
Age: 55
End: 2020-02-23

## 2020-03-18 ENCOUNTER — FCC EXTENDED DOCUMENTATION (OUTPATIENT)
Dept: PSYCHOLOGY | Facility: CLINIC | Age: 55
End: 2020-03-18
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
## 2020-03-29 DIAGNOSIS — G47.01 INSOMNIA DUE TO MEDICAL CONDITION: ICD-10-CM

## 2020-03-31 RX ORDER — ZOLPIDEM TARTRATE 10 MG/1
TABLET ORAL
Qty: 30 TABLET | Refills: 1 | Status: SHIPPED | OUTPATIENT
Start: 2020-03-31 | End: 2020-05-14

## 2020-05-04 ENCOUNTER — OFFICE VISIT (OUTPATIENT)
Dept: FAMILY MEDICINE | Facility: CLINIC | Age: 55
End: 2020-05-04

## 2020-05-04 VITALS
WEIGHT: 192 LBS | RESPIRATION RATE: 16 BRPM | DIASTOLIC BLOOD PRESSURE: 72 MMHG | BODY MASS INDEX: 35.12 KG/M2 | HEART RATE: 55 BPM | SYSTOLIC BLOOD PRESSURE: 126 MMHG | TEMPERATURE: 97.5 F

## 2020-05-04 DIAGNOSIS — R51.9 NONINTRACTABLE HEADACHE, UNSPECIFIED CHRONICITY PATTERN, UNSPECIFIED HEADACHE TYPE: ICD-10-CM

## 2020-05-04 DIAGNOSIS — C22.0 LIVER CELL CARCINOMA (H): ICD-10-CM

## 2020-05-04 DIAGNOSIS — Z94.4 HISTORY OF LIVER TRANSPLANT (H): ICD-10-CM

## 2020-05-04 DIAGNOSIS — M79.7 FIBROMYALGIA: Primary | ICD-10-CM

## 2020-05-04 PROCEDURE — 99214 OFFICE O/P EST MOD 30 MIN: CPT | Performed by: FAMILY MEDICINE

## 2020-05-04 RX ORDER — PRAZOSIN HYDROCHLORIDE 1 MG/1
CAPSULE ORAL
COMMUNITY
Start: 2020-04-30 | End: 2023-02-16

## 2020-05-04 RX ORDER — BUTALBITAL, ACETAMINOPHEN AND CAFFEINE 50; 325; 40 MG/1; MG/1; MG/1
TABLET ORAL
Qty: 30 TABLET | Refills: 3 | Status: SHIPPED | OUTPATIENT
Start: 2020-05-04 | End: 2020-07-16

## 2020-05-04 RX ORDER — ARIPIPRAZOLE 10 MG/1
TABLET ORAL
COMMUNITY
Start: 2020-04-23

## 2020-05-04 RX ORDER — SERTRALINE HYDROCHLORIDE 100 MG/1
TABLET, FILM COATED ORAL
COMMUNITY
Start: 2020-04-30 | End: 2023-02-16

## 2020-05-04 NOTE — PROGRESS NOTES
"Problem(s) Oriented visit        SUBJECTIVE:                                                    Nani Ford is a 54 year old female who presents to clinic today for the following health issues :        1. Fibromyalgia  Fibromyalgia  Has widespread musculoskeletal pain involving both sides of the body and present above and below the waist. The pain may initially be localized, often in the neck and shoulders. \"I feel as if I hurt all over,\" Pain predominantly throughout the muscles, but often state that her joints hurt, and sometimes describe joint swelling.  ?Fatigue - Seemingly minor activity heightens symptoms. she is stiff in the morning and feel unrefreshed, even if .she have slept 8 to 10 hours. she sleeps \"lightly,\" waking frequently during the early morning and have difficulty getting back to sleep. \"No matter how much sleep I get, it feels like a truck ran me over in the morning.\"  applies to her.  ?Cognitive disturbances - she * reports as \"fibro fog.\" she typically describe problems with attention and difficulty doing tasks that require rapid thought changes.   ?Headache - Headaches  present  ?Paresthesias - Patients  report paresthesias, including numbness, tingling, burning, or creeping or crawling sensations, especially in both arms and both legs.  ?Other symptoms and disorders - Patients also may have a variety of poorly understood pain symptoms, including abdominal and chest wall pain, such as symptoms of costochondritis; symptoms suggestive of IBS; and pelvic pain and bladder symptoms of frequency and urgency suggestive of the interstitial cystitis/painful bladder syndrome (formerly female urethral syndrome)        2. Liver cell carcinoma (H)  Status post transplant and doing well    3. History of liver transplant (H)    4. Nonintractable headache, unspecified chronicity pattern, unspecified headache type  Headaches are not unilateral.  No vision changes.  No \"aura\" before headaches.   Denies " nausea, vomiting, phonophobia, or photophobia.     - butalbital-acetaminophen-caffeine (ESGIC) -40 MG tablet; TAKE 1 TABLET BY MOUTH EVERY 4 HOURS AS NEEDED FOR HEADACHES  Dispense: 30 tablet; Refill: 3       Problem list, Medication list, Allergies, and Medical/Social/Surgical histories reviewed in [facility] and updated as appropriate.   Additional history: as documented    ROS:  General and Resp. completed and negative except as noted above    Histories:   Patient Active Problem List   Diagnosis     History of liver transplant (H)     HTN (hypertension)     History of kidney transplant     Hyperlipidemia with target LDL less than 100     History of gout     Anemia     Scleroderma, diffuse (H)     Raynaud phenomenon     Health Care Home     Fibromyalgia     Major depressive disorder, recurrent episode, moderate (H)     Insomnia, unspecified type     Cervical cancer (H)     Complication of transplanted kidney     Cannabis dependence (H)     Liver cell carcinoma (H)     Obesity (BMI 35.0-39.9) with comorbidity (H)     STEMI (ST elevation myocardial infarction) (H)     Past Surgical History:   Procedure Laterality Date     BIOPSY       CV CORONARY ANGIOGRAM N/A 10/10/2019    Procedure: Coronary Angiogram;  Surgeon: Rajendra De La Torre MD;  Location:  HEART CARDIAC CATH LAB     CV LEFT HEART CATH N/A 10/10/2019    Procedure: Left Heart Cath;  Surgeon: Rajendra De La Torre MD;  Location:  HEART CARDIAC CATH LAB     CV PCI STENT DRUG ELUTING N/A 10/10/2019    Procedure: PCI Stent Drug Eluting;  Surgeon: Rajendra De La Torre MD;  Location:  HEART CARDIAC CATH LAB     GI SURGERY       HEART CATH LEFT HEART CATH  10/10/2019    Successful PCI of the D2 with a 2.61w06ov Synergy drug eluting stent.     RETURN LIVER TRANSPLANT      21 years ago     TRANSPLANT         Social History     Tobacco Use     Smoking status: Former Smoker     Packs/day: 0.30     Years: 30.00     Pack years: 9.00     Types: Cigarettes      Last attempt to quit: 10/30/2012     Years since quittin.5     Smokeless tobacco: Never Used   Substance Use Topics     Alcohol use: No     Alcohol/week: 0.0 standard drinks     Family History   Problem Relation Age of Onset     Myocardial Infarction Mother 57     Hypertension Mother      Substance Abuse Sister            OBJECTIVE:                                                    /72   Pulse 55   Temp 97.5  F (36.4  C) (Oral)   Resp 16   Wt 87.1 kg (192 lb)   BMI 35.12 kg/m    Body mass index is 35.12 kg/m .   APPEARANCE: = Relaxed and in no distress  Conj/Eyelids = noninjected and lids and lashes are without inflammation  PERRLA/Irises = Pupils Round Reactive to Light and Irisis without inflammation  Neck = No anterior or posterior adenopathy appreciated.  Thyroid = Not enlarged and no masses felt  Resp effort = Calm regular breathing  Breath Sounds = Good air movement with no rales or rhonchi in any lung fields  Heart Rate, Rhythm, & sounds (no Murm)  = Regular rate and rhythm with no S3, S4, or murmur appreciated.  Carotid Art's = Pulses full and equal and no bruits appreciated  Abdomen = Soft, nontender, no masses, & bowel sounds in all quadrants  Liver/Spleen = Normal span and no splenomegaly noted  Digits and Nails = FROM in all finger joints, no nail dystrophy  Ext (edema) = No pretibial edema noted or elsewhere  Musculsktl =  Strength and ROM of major joints are within normal limits  SKIN = absent significant rashes or lesions   Recent/Remote Memory = Alert and Oriented x 3  Mood/Affect = Cooperative and interested     ASSESSMENT/PLAN:                                                        Nani was seen today for forms.    Diagnoses and all orders for this visit:    Fibromyalgia    Liver cell carcinoma (H)    History of liver transplant (H)    Nonintractable headache, unspecified chronicity pattern, unspecified headache type  -     butalbital-acetaminophen-caffeine (ESGIC) -40 MG  tablet; TAKE 1 TABLET BY MOUTH EVERY 4 HOURS AS NEEDED FOR HEADACHES        See Patient Instructions  There are no Patient Instructions on file for this visit.    The following health maintenance items are reviewed in Epic and correct as of today:  Health Maintenance   Topic Date Due     HIV SCREENING  09/25/1982     MEDICARE ANNUAL WELLNESS VISIT  09/25/1985     ZOSTER IMMUNIZATION (1 of 2) 09/25/2017     COLORECTAL CANCER SCREENING  01/12/2019     MAMMO SCREENING  11/15/2019     PHQ-9  06/18/2020     DTAP/TDAP/TD IMMUNIZATION (3 - Td) 11/15/2021     LIPID  10/18/2024     DEPRESSION ACTION PLAN  Completed     INFLUENZA VACCINE  Completed     PNEUMOCOCCAL IMMUNIZATION 19-64 MEDIUM RISK  Completed     IPV IMMUNIZATION  Aged Out     MENINGITIS IMMUNIZATION  Aged Out     PAP  Discontinued       Tavo Mann MD  [facility]  Family Practice  [facility]  [phone]    For any issues my office # is [phone]

## 2020-05-13 DIAGNOSIS — E78.5 HYPERLIPIDEMIA LDL GOAL <70: ICD-10-CM

## 2020-05-13 RX ORDER — METOPROLOL SUCCINATE 50 MG/1
50 TABLET, EXTENDED RELEASE ORAL DAILY
Qty: 90 TABLET | Refills: 1 | Status: SHIPPED | OUTPATIENT
Start: 2020-05-13 | End: 2020-11-20

## 2020-05-14 DIAGNOSIS — K21.9 GASTROESOPHAGEAL REFLUX DISEASE WITHOUT ESOPHAGITIS: ICD-10-CM

## 2020-05-14 DIAGNOSIS — G47.01 INSOMNIA DUE TO MEDICAL CONDITION: ICD-10-CM

## 2020-05-14 RX ORDER — ZOLPIDEM TARTRATE 10 MG/1
10 TABLET ORAL AT BEDTIME
Qty: 30 TABLET | Refills: 1 | Status: SHIPPED | OUTPATIENT
Start: 2020-05-14 | End: 2020-09-01

## 2020-05-14 RX ORDER — OMEPRAZOLE 40 MG/1
CAPSULE, DELAYED RELEASE ORAL
Qty: 180 CAPSULE | Refills: 3 | Status: SHIPPED | OUTPATIENT
Start: 2020-05-14 | End: 2021-05-13

## 2020-06-15 ENCOUNTER — TELEPHONE (OUTPATIENT)
Dept: FAMILY MEDICINE | Facility: CLINIC | Age: 55
End: 2020-06-15

## 2020-06-15 NOTE — TELEPHONE ENCOUNTER
Received fax from [employer] requesting PA for Zolpidem .  Submitted through [employer]. Will wait for response.

## 2020-06-18 NOTE — TELEPHONE ENCOUNTER
Did not receive PA approval but looked up in covermymeds and PA was approved. Approval dates  05/16/2020-06/15/2021.

## 2020-06-22 DIAGNOSIS — D64.9 ANEMIA, UNSPECIFIED TYPE: ICD-10-CM

## 2020-06-22 DIAGNOSIS — Z79.899 HIGH RISK MEDICATION USE: Primary | ICD-10-CM

## 2020-06-22 DIAGNOSIS — Z94.0 HISTORY OF KIDNEY TRANSPLANT: ICD-10-CM

## 2020-06-22 DIAGNOSIS — I10 ESSENTIAL HYPERTENSION: ICD-10-CM

## 2020-06-22 NOTE — PROGRESS NOTES
[facility] fax [phone] - a1c, lipid, cbctsh, t4 free, bmp  Attn HOLDEN Greenwood, DNP    No appt as of this ordering session  Ashley Berry MA June 22, 2020 3:12 PM

## 2020-07-16 DIAGNOSIS — R51.9 NONINTRACTABLE HEADACHE, UNSPECIFIED CHRONICITY PATTERN, UNSPECIFIED HEADACHE TYPE: ICD-10-CM

## 2020-07-16 RX ORDER — BUTALBITAL, ACETAMINOPHEN AND CAFFEINE 50; 325; 40 MG/1; MG/1; MG/1
TABLET ORAL
Qty: 30 TABLET | Refills: 3 | Status: SHIPPED | OUTPATIENT
Start: 2020-07-16 | End: 2020-09-10

## 2020-08-31 DIAGNOSIS — G47.01 INSOMNIA DUE TO MEDICAL CONDITION: ICD-10-CM

## 2020-09-01 RX ORDER — ZOLPIDEM TARTRATE 10 MG/1
10 TABLET ORAL AT BEDTIME
Qty: 30 TABLET | Refills: 1 | Status: SHIPPED | OUTPATIENT
Start: 2020-09-01 | End: 2020-11-01

## 2020-09-09 DIAGNOSIS — R51.9 NONINTRACTABLE HEADACHE, UNSPECIFIED CHRONICITY PATTERN, UNSPECIFIED HEADACHE TYPE: ICD-10-CM

## 2020-09-10 RX ORDER — BUTALBITAL, ACETAMINOPHEN AND CAFFEINE 50; 325; 40 MG/1; MG/1; MG/1
TABLET ORAL
Qty: 30 TABLET | Refills: 5 | Status: SHIPPED | OUTPATIENT
Start: 2020-09-10 | End: 2021-01-13

## 2020-10-05 DIAGNOSIS — E78.5 HYPERLIPIDEMIA LDL GOAL <70: ICD-10-CM

## 2020-10-05 DIAGNOSIS — I25.10 CORONARY ARTERY DISEASE INVOLVING NATIVE CORONARY ARTERY OF NATIVE HEART WITHOUT ANGINA PECTORIS: ICD-10-CM

## 2020-10-05 DIAGNOSIS — I25.5 ISCHEMIC CARDIOMYOPATHY: ICD-10-CM

## 2020-10-05 DIAGNOSIS — R07.89 ATYPICAL CHEST PAIN: ICD-10-CM

## 2020-10-05 RX ORDER — CLOPIDOGREL BISULFATE 75 MG/1
TABLET ORAL
Qty: 90 TABLET | Refills: 0 | Status: SHIPPED | OUTPATIENT
Start: 2020-10-05 | End: 2024-03-05

## 2020-10-07 ENCOUNTER — DOCUMENTATION ONLY (OUTPATIENT)
Dept: CARDIOLOGY | Facility: CLINIC | Age: 55
End: 2020-10-07

## 2020-10-07 ENCOUNTER — TELEPHONE (OUTPATIENT)
Dept: CARDIOLOGY | Facility: CLINIC | Age: 55
End: 2020-10-07

## 2020-10-07 DIAGNOSIS — I21.3 STEMI (ST ELEVATION MYOCARDIAL INFARCTION) (H): Primary | ICD-10-CM

## 2020-10-07 NOTE — PROGRESS NOTES
Patient is nearing end of 1st year on plavix post-stent. Angiogram was 10/10/2019. Patient has not had cardiologist review since discharge.   Patient was last seen 11/2019.  Will message CAIT Romero Sapp to clarify which provider to be seen next.      10/13/2020 Reply from CAIT Romero Sapp:  Dr. Desouza saw the patient in the hospital. Please arrange a follow up with her. Keep taking plavix until that visit.     Thanks!     Order placed for office visit with Dr. Desouza. Message to scheduling to contact patient.

## 2020-10-07 NOTE — TELEPHONE ENCOUNTER
PA Initiation    Medication: plavix 75mg -   Insurance Company: [employer] - Phone [phone] Fax [phone]  [id number] [place], [place] - [address]  Filling Pharmacy Phone: [phone]  Filling Pharmacy Fax: [phone]  Start Date: 10/7/2020

## 2020-10-07 NOTE — TELEPHONE ENCOUNTER
Prior Authorization Retail Medication Request    Medication/Dose: plavix 75mg  ICD code (if different than what is on RX):  CAD  Previously Tried and Failed:  brillinta caused SOB  Rationale: admitted to [facility] with complaints of chest pain.  Troponin elevated at 1.5.  EKG showed sinus tachycardia with ST segment elevation in leads I, aVL, V2, V3, V4, V5, V6.  There is reciprocal depression in leads II, with 3 and aVF.  She urgently underwent a coronary angiography for an acute anterolateral wall ST segment elevation myocardial infarction.  Single-vessel obstructive coronary artery disease was noted of the second obtuse marginal mild nonobstructive disease elsewhere.  Successful PCI with a drug-eluting stent to the second diagonal.  She was appropriately initiated on aspirin lifelong and Brilinta for 1 year uninterrupted  Had side effect od SOB, transitioned to plavix    Insurance Name:  [facility]  Insurance ID:[id number]      Pharmacy Information (if different than what is on RX)  Name:  [facility] [address] [place]  Phone:       ASAP PLEASE

## 2020-10-14 NOTE — TELEPHONE ENCOUNTER
Prior Authorization Not Needed per Insurance    Medication: plavix 75mg - NOT NEEDED  Insurance Company: [employer] - Phone [phone] Fax [phone]  Expected CoPay:      Pharmacy Filling the Rx: [id number] - [place], [place] - [address]  Pharmacy Notified: Yes  Patient Notified: Comment:  Pt already picked up

## 2020-11-01 DIAGNOSIS — G47.01 INSOMNIA DUE TO MEDICAL CONDITION: ICD-10-CM

## 2020-11-01 RX ORDER — ZOLPIDEM TARTRATE 10 MG/1
TABLET ORAL
Qty: 30 TABLET | Refills: 1 | Status: SHIPPED | OUTPATIENT
Start: 2020-11-01 | End: 2020-12-23

## 2020-11-20 DIAGNOSIS — E78.5 HYPERLIPIDEMIA LDL GOAL <70: ICD-10-CM

## 2020-11-20 RX ORDER — METOPROLOL SUCCINATE 50 MG/1
50 TABLET, EXTENDED RELEASE ORAL DAILY
Qty: 90 TABLET | Refills: 0 | Status: SHIPPED | OUTPATIENT
Start: 2020-11-20 | End: 2021-02-22

## 2020-12-23 DIAGNOSIS — G47.01 INSOMNIA DUE TO MEDICAL CONDITION: ICD-10-CM

## 2020-12-23 RX ORDER — ZOLPIDEM TARTRATE 10 MG/1
TABLET ORAL
Qty: 30 TABLET | Refills: 1 | Status: SHIPPED | OUTPATIENT
Start: 2020-12-23 | End: 2021-02-18

## 2021-01-13 DIAGNOSIS — R51.9 NONINTRACTABLE HEADACHE, UNSPECIFIED CHRONICITY PATTERN, UNSPECIFIED HEADACHE TYPE: ICD-10-CM

## 2021-01-14 RX ORDER — BUTALBITAL, ACETAMINOPHEN AND CAFFEINE 50; 325; 40 MG/1; MG/1; MG/1
TABLET ORAL
Qty: 30 TABLET | Refills: 0 | Status: SHIPPED | OUTPATIENT
Start: 2021-01-14 | End: 2021-03-19

## 2021-01-25 DIAGNOSIS — Z76.0 ENCOUNTER FOR MEDICATION REFILL: ICD-10-CM

## 2021-01-26 RX ORDER — AMLODIPINE BESYLATE 5 MG/1
TABLET ORAL
Qty: 90 TABLET | Refills: 3 | Status: SHIPPED | OUTPATIENT
Start: 2021-01-26 | End: 2022-01-21

## 2021-01-26 NOTE — TELEPHONE ENCOUNTER
Norvasc  LOV 5/4/20    BP Readings from Last 3 Encounters:   05/04/20 126/72   11/12/19 127/84   11/01/19 136/80

## 2021-02-17 DIAGNOSIS — G47.01 INSOMNIA DUE TO MEDICAL CONDITION: ICD-10-CM

## 2021-02-17 NOTE — TELEPHONE ENCOUNTER
zolpidem (AMBIEN) 10 MG     Last OV - 5/4/20    Last labs 10/5/2019    Due for labs- in open orders

## 2021-02-18 RX ORDER — ZOLPIDEM TARTRATE 10 MG/1
TABLET ORAL
Qty: 30 TABLET | Refills: 1 | Status: SHIPPED | OUTPATIENT
Start: 2021-02-18 | End: 2021-04-15

## 2021-02-22 DIAGNOSIS — E78.5 HYPERLIPIDEMIA LDL GOAL <70: ICD-10-CM

## 2021-02-22 DIAGNOSIS — I10 ESSENTIAL HYPERTENSION: Primary | ICD-10-CM

## 2021-02-23 RX ORDER — METOPROLOL SUCCINATE 50 MG/1
50 TABLET, EXTENDED RELEASE ORAL DAILY
Qty: 90 TABLET | Refills: 0 | Status: SHIPPED | OUTPATIENT
Start: 2021-02-23 | End: 2021-05-24

## 2021-03-16 DIAGNOSIS — Z79.899 NEED FOR PROPHYLACTIC CHEMOTHERAPY: ICD-10-CM

## 2021-03-16 DIAGNOSIS — Z94.4 LIVER REPLACED BY TRANSPLANT (H): Primary | ICD-10-CM

## 2021-03-16 LAB
% GRANULOCYTES: 66.6 % (ref 42.2–75.2)
HCT VFR BLD AUTO: 43.2 % (ref 35–46)
HEMOGLOBIN: 14.3 G/DL (ref 11.8–15.5)
LYMPHOCYTES NFR BLD AUTO: 27.1 % (ref 20.5–51.1)
MCH RBC QN AUTO: 26.7 PG (ref 27–31)
MCHC RBC AUTO-ENTMCNC: 33.1 G/DL (ref 33–37)
MCV RBC AUTO: 80.5 FL (ref 80–100)
MONOCYTES NFR BLD AUTO: 6.3 % (ref 1.7–9.3)
PLATELET # BLD AUTO: 257 K/UL (ref 140–450)
RBC # BLD AUTO: 5.36 X10/CMM (ref 3.7–5.2)
WBC # BLD AUTO: 7.9 X10/CMM (ref 3.8–11)

## 2021-03-16 PROCEDURE — 85025 COMPLETE CBC W/AUTO DIFF WBC: CPT | Performed by: FAMILY MEDICINE

## 2021-03-16 PROCEDURE — 36415 COLL VENOUS BLD VENIPUNCTURE: CPT | Performed by: FAMILY MEDICINE

## 2021-03-16 NOTE — LETTER
[facility] [address] [place]  [address]  Phone: [phone]    March 19, 2021      Nani Ford  [address] [facility] [id number]              Dear Nani,     I am writing to report that your included test results are within expected ranges. I do not suggest that we make any changes at this time.       Tavo Mann M.D.    Results for orders placed or performed in visit on 03/16/21   CBC with Diff/Plt ([facility])     Status: Abnormal   Result Value Ref Range    WBC x10/cmm 7.9 3.8 - 11.0 x10/cmm    % Lymphocytes 27.1 20.5 - 51.1 %    % Monocytes 6.3 1.7 - 9.3 %    % Granulocytes 66.6 42.2 - 75.2 %    RBC x10/cmm 5.36 (A) 3.7 - 5.2 x10/cmm    Hemoglobin 14.3 11.8 - 15.5 g/dl    Hematocrit 43.2 35 - 46 %    MCV 80.5 80 - 100 fL    MCH 26.7 (A) 27.0 - 31.0 pg    MCHC 33.1 33.0 - 37.0 g/dL    Platelet Count 257 140 - 450 K/uL   Comp. Metabolic Panel (14) ([facility])     Status: Abnormal   Result Value Ref Range    Glucose 86 65 - 99 mg/dL    Urea Nitrogen 37 (H) 6 - 24 mg/dL    Creatinine 1.12 (H) 0.57 - 1.00 mg/dL    eGFR If NonAfricn Am 56 (L) >59 mL/min/1.73    eGFR If Africn Am 65 >59 mL/min/1.73    BUN/Creatinine Ratio 33 (H) 9 - 23    Sodium 143 134 - 144 mmol/L    Potassium 5.4 (H) 3.5 - 5.2 mmol/L    Chloride 105 96 - 106 mmol/L    Total CO2 26 20 - 29 mmol/L    Calcium 10.1 8.7 - 10.2 mg/dL    Protein Total 7.3 6.0 - 8.5 g/dL    Albumin 4.3 3.8 - 4.9 g/dL    Globulin, Total 3.0 1.5 - 4.5 g/dL    A/G Ratio 1.4 1.2 - 2.2    Bilirubin Total 0.2 0.0 - 1.2 mg/dL    Alkaline Phosphatase 56 39 - 117 IU/L    AST 14 0 - 40 IU/L    ALT 9 0 - 32 IU/L    Narrative    Performed at:  01 - [facility] [address], [place], [place]  [id number]  : Zheng Garcia MD, Phone:  [phone]

## 2021-03-17 ENCOUNTER — IMMUNIZATION (OUTPATIENT)
Dept: NURSING | Facility: CLINIC | Age: 56
End: 2021-03-17
Payer: COMMERCIAL

## 2021-03-17 LAB
ALBUMIN SERPL-MCNC: 4.3 G/DL (ref 3.8–4.9)
ALBUMIN/GLOB SERPL: 1.4 {RATIO} (ref 1.2–2.2)
ALP SERPL-CCNC: 56 IU/L (ref 39–117)
ALT SERPL-CCNC: 9 IU/L (ref 0–32)
AST SERPL-CCNC: 14 IU/L (ref 0–40)
BILIRUB SERPL-MCNC: 0.2 MG/DL (ref 0–1.2)
BUN SERPL-MCNC: 37 MG/DL (ref 6–24)
BUN/CREATININE RATIO: 33 (ref 9–23)
CALCIUM SERPL-MCNC: 10.1 MG/DL (ref 8.7–10.2)
CHLORIDE SERPLBLD-SCNC: 105 MMOL/L (ref 96–106)
CREAT SERPL-MCNC: 1.12 MG/DL (ref 0.57–1)
EGFR IF AFRICN AM: 65 ML/MIN/1.73
EGFR IF NONAFRICN AM: 56 ML/MIN/1.73
GLOBULIN, TOTAL: 3 G/DL (ref 1.5–4.5)
GLUCOSE SERPL-MCNC: 86 MG/DL (ref 65–99)
POTASSIUM SERPL-SCNC: 5.4 MMOL/L (ref 3.5–5.2)
PROT SERPL-MCNC: 7.3 G/DL (ref 6–8.5)
SODIUM SERPL-SCNC: 143 MMOL/L (ref 134–144)
TOTAL CO2: 26 MMOL/L (ref 20–29)

## 2021-03-17 PROCEDURE — 0001A PR COVID VAC PFIZER DIL RECON 30 MCG/0.3 ML IM: CPT

## 2021-03-17 PROCEDURE — 91300 PR COVID VAC PFIZER DIL RECON 30 MCG/0.3 ML IM: CPT

## 2021-03-18 NOTE — RESULT ENCOUNTER NOTE
Dear Nani,   I am writing to report that your included test results are within expected ranges. I do not suggest that we make any changes at this time.  Tavo Mann M.D.

## 2021-03-19 ENCOUNTER — OFFICE VISIT (OUTPATIENT)
Dept: FAMILY MEDICINE | Facility: CLINIC | Age: 56
End: 2021-03-19

## 2021-03-19 VITALS
RESPIRATION RATE: 16 BRPM | DIASTOLIC BLOOD PRESSURE: 78 MMHG | BODY MASS INDEX: 36.25 KG/M2 | TEMPERATURE: 98.3 F | HEART RATE: 72 BPM | WEIGHT: 197 LBS | SYSTOLIC BLOOD PRESSURE: 145 MMHG | HEIGHT: 62 IN | OXYGEN SATURATION: 98 %

## 2021-03-19 DIAGNOSIS — D48.5 NEOPLASM OF UNCERTAIN BEHAVIOR OF SKIN: Primary | ICD-10-CM

## 2021-03-19 DIAGNOSIS — L98.9 SKIN LESION: ICD-10-CM

## 2021-03-19 DIAGNOSIS — R51.9 NONINTRACTABLE HEADACHE, UNSPECIFIED CHRONICITY PATTERN, UNSPECIFIED HEADACHE TYPE: ICD-10-CM

## 2021-03-19 DIAGNOSIS — I10 ESSENTIAL HYPERTENSION: ICD-10-CM

## 2021-03-19 PROCEDURE — 93050 ART PRESSURE WAVEFORM ANALYS: CPT | Performed by: FAMILY MEDICINE

## 2021-03-19 PROCEDURE — 99213 OFFICE O/P EST LOW 20 MIN: CPT | Mod: 25 | Performed by: FAMILY MEDICINE

## 2021-03-19 PROCEDURE — 11102 TANGNTL BX SKIN SINGLE LES: CPT | Performed by: FAMILY MEDICINE

## 2021-03-19 RX ORDER — BUTALBITAL, ACETAMINOPHEN AND CAFFEINE 50; 325; 40 MG/1; MG/1; MG/1
TABLET ORAL
Qty: 30 TABLET | Refills: 3 | Status: SHIPPED | OUTPATIENT
Start: 2021-03-19 | End: 2021-07-11

## 2021-03-19 ASSESSMENT — MIFFLIN-ST. JEOR: SCORE: 1451.84

## 2021-03-19 NOTE — PROGRESS NOTES
"  Deandra Cardoza is a 55 year old patient who presents to clinic for evaluation.  Noticed small bump on top of head.  Has gotten bigger and more tender.  She has picked at it and no pus has drained.  No other concerns.      Review of Systems   Constitutional, HEENT, cardiovascular, pulmonary, gi and gu systems are negative, except as otherwise noted.      Objective    BP (!) 145/78   Pulse 72   Temp 98.3  F (36.8  C)   Resp 16   Ht 1.575 m (5' 2\")   Wt 89.4 kg (197 lb)   SpO2 98%   BMI 36.03 kg/m      General: Well appearing, NAD  Skin: raised approximately 8 x 8 mm pink papule with verrucous crusted plaque on top. tender  Psych: normal mood and affect      PROCEDURE:    PROCEDURE:    After potential risks and alternative options were discussed, written consent was obtained. The lesion was outlined with a surgical marking pen and measured 8x8 mm.  Under sterile precautions, using a 30 gauge needle, 1% lidocaine with epinephrine was infiltrated beneath the lesion.  Using a dermablade the lesion was shaved with minimal residual abnormal appearing tissue.  Hemostasis was achieved with direct pressure and drysol.  Vaseline was applied.  There was minimal blood loss.  The patient tolerated the procedure well and there were no immediate complications.  The specimen was labeled and sent to pathology for review.  Proper wound care and reasons to follow up were discussed and understood.        Assessment & Plan     Neoplasm of uncertain behavior of skin  Keratoacanthoma vs wart vs cutaneous horn vs atypical scab vs NMSC.  Path pending.  Wound care discussed  - Pathology Report ([facility])  - [place] TANGENTIAL BIOPSY OF SKIN, FIRST/SINGLE LESION    Essential hypertension  Above goal, monitor at home and follow up  - [place] ART PRESS WAVEFORM ANALYS CENTRAL ART PRESSURE    Skin lesion  See above  - Pathology Report ([facility])  - [place] TANGENTIAL BIOPSY OF SKIN, FIRST/SINGLE LESION  }      No follow-ups on file.    Billy MCLEAN " MD Surinder  [facility]

## 2021-03-23 ENCOUNTER — TELEPHONE (OUTPATIENT)
Dept: FAMILY MEDICINE | Facility: CLINIC | Age: 56
End: 2021-03-23

## 2021-03-23 DIAGNOSIS — C44.42 SQUAMOUS CELL CARCINOMA OF SCALP: Primary | ICD-10-CM

## 2021-03-23 LAB
.: NORMAL
CLINICIAN PROVIDED ICD10: NORMAL
PATHOLOGIST PROVIDED ICD10: NORMAL
PDF RESULT: NORMAL

## 2021-03-23 NOTE — TELEPHONE ENCOUNTER
----- Message from HOLDEN Dillard CNP sent at 3/23/2021 12:27 PM CDT -----  Looking through Billy's inbox- path demonstrates squamous cell carcinoma, needs to get in with derm ASAP.  Thanks,  Sheri

## 2021-03-23 NOTE — TELEPHONE ENCOUNTER
Called patient with path report.  Informed patient of skin cancer.  Referral made to Dermatology Specialist.  They will call patient to schedule.

## 2021-03-30 ENCOUNTER — TRANSFERRED RECORDS (OUTPATIENT)
Dept: FAMILY MEDICINE | Facility: CLINIC | Age: 56
End: 2021-03-30

## 2021-04-02 NOTE — TELEPHONE ENCOUNTER
LMTCB--the outstanding orders in Epic are for [facility].      Not sure if patient still needs.    Baltazar

## 2021-04-07 ENCOUNTER — IMMUNIZATION (OUTPATIENT)
Dept: NURSING | Facility: CLINIC | Age: 56
End: 2021-04-07
Attending: INTERNAL MEDICINE
Payer: COMMERCIAL

## 2021-04-07 PROCEDURE — 91300 PR COVID VAC PFIZER DIL RECON 30 MCG/0.3 ML IM: CPT

## 2021-04-07 PROCEDURE — 0002A PR COVID VAC PFIZER DIL RECON 30 MCG/0.3 ML IM: CPT

## 2021-04-09 ENCOUNTER — TRANSFERRED RECORDS (OUTPATIENT)
Dept: FAMILY MEDICINE | Facility: CLINIC | Age: 56
End: 2021-04-09

## 2021-04-15 DIAGNOSIS — G47.01 INSOMNIA DUE TO MEDICAL CONDITION: ICD-10-CM

## 2021-04-15 RX ORDER — ZOLPIDEM TARTRATE 10 MG/1
TABLET ORAL
Qty: 30 TABLET | Refills: 1 | Status: SHIPPED | OUTPATIENT
Start: 2021-04-15 | End: 2021-06-10

## 2021-05-13 DIAGNOSIS — K21.9 GASTROESOPHAGEAL REFLUX DISEASE WITHOUT ESOPHAGITIS: ICD-10-CM

## 2021-05-13 RX ORDER — OMEPRAZOLE 40 MG/1
CAPSULE, DELAYED RELEASE ORAL
Qty: 180 CAPSULE | Refills: 3 | Status: SHIPPED | OUTPATIENT
Start: 2021-05-13 | End: 2022-04-28

## 2021-05-24 DIAGNOSIS — I10 ESSENTIAL HYPERTENSION: ICD-10-CM

## 2021-05-24 RX ORDER — METOPROLOL SUCCINATE 50 MG/1
TABLET, EXTENDED RELEASE ORAL
Qty: 90 TABLET | Refills: 3 | Status: SHIPPED | OUTPATIENT
Start: 2021-05-24 | End: 2022-06-02

## 2021-05-24 NOTE — TELEPHONE ENCOUNTER
metoprolol succinate ER (TOPROL-XL) 50 MG 24 hr    LOV - 3/19/21    lsdy lsnd 3/16/21    BP Readings from Last 3 Encounters:   03/19/21 (!) 145/78   05/04/20 126/72   11/12/19 127/84     Last Comprehensive Metabolic Panel:  Sodium   Date Value Ref Range Status   03/16/2021 143 134 - 144 mmol/L Final     Potassium   Date Value Ref Range Status   03/16/2021 5.4 (H) 3.5 - 5.2 mmol/L Final     Chloride   Date Value Ref Range Status   03/16/2021 105 96 - 106 mmol/L Final     Carbon Dioxide   Date Value Ref Range Status   11/05/2019 25 23 - 29 mmol/L Final     Anion Gap   Date Value Ref Range Status   11/05/2019 13.5 6 - 17 mmol/L Final     Glucose   Date Value Ref Range Status   03/16/2021 86 65 - 99 mg/dL Final     Urea Nitrogen   Date Value Ref Range Status   03/16/2021 37 (H) 6 - 24 mg/dL Final     BUN/Creatinine Ratio   Date Value Ref Range Status   03/16/2021 33 (H) 9 - 23 Final     Creatinine   Date Value Ref Range Status   03/16/2021 1.12 (H) 0.57 - 1.00 mg/dL Final     GFR Estimate   Date Value Ref Range Status   11/05/2019 50 (L) >60 mL/min/[1.73_m2] Final     Calcium   Date Value Ref Range Status   03/16/2021 10.1 8.7 - 10.2 mg/dL Final

## 2021-06-10 DIAGNOSIS — G47.01 INSOMNIA DUE TO MEDICAL CONDITION: ICD-10-CM

## 2021-06-10 RX ORDER — ZOLPIDEM TARTRATE 10 MG/1
TABLET ORAL
Qty: 30 TABLET | Refills: 1 | Status: SHIPPED | OUTPATIENT
Start: 2021-06-10 | End: 2022-04-14

## 2021-07-19 ENCOUNTER — TELEPHONE (OUTPATIENT)
Dept: FAMILY MEDICINE | Facility: CLINIC | Age: 56
End: 2021-07-19

## 2021-07-19 NOTE — CONFIDENTIAL NOTE
Received notice from pharmacy that zolpidem 10mg is requiring prior authorization.   Submitted prior authorization request via Cover My Meds. Will await response.  Jazmin Way RN

## 2021-07-19 NOTE — TELEPHONE ENCOUNTER
Prior authorization approved via CoverMyMeds. Approval 6/19/21-7/19/22. Will wait for faxed approval. Ericka Brown

## 2021-07-22 ENCOUNTER — OFFICE VISIT (OUTPATIENT)
Dept: FAMILY MEDICINE | Facility: CLINIC | Age: 56
End: 2021-07-22

## 2021-07-22 VITALS
WEIGHT: 181.6 LBS | OXYGEN SATURATION: 96 % | BODY MASS INDEX: 33.22 KG/M2 | SYSTOLIC BLOOD PRESSURE: 131 MMHG | HEART RATE: 61 BPM | DIASTOLIC BLOOD PRESSURE: 70 MMHG

## 2021-07-22 DIAGNOSIS — I10 ESSENTIAL HYPERTENSION: Primary | ICD-10-CM

## 2021-07-22 DIAGNOSIS — Z94.4 HISTORY OF LIVER TRANSPLANT (H): ICD-10-CM

## 2021-07-22 DIAGNOSIS — G47.01 INSOMNIA DUE TO MEDICAL CONDITION: ICD-10-CM

## 2021-07-22 DIAGNOSIS — M34.9 SCLERODERMA, DIFFUSE (H): ICD-10-CM

## 2021-07-22 PROCEDURE — 99214 OFFICE O/P EST MOD 30 MIN: CPT | Performed by: FAMILY MEDICINE

## 2021-07-22 PROCEDURE — 93050 ART PRESSURE WAVEFORM ANALYS: CPT | Performed by: FAMILY MEDICINE

## 2021-07-22 RX ORDER — ZOLPIDEM TARTRATE 5 MG/1
5 TABLET ORAL
Qty: 30 TABLET | Refills: 5 | Status: SHIPPED | OUTPATIENT
Start: 2021-07-22 | End: 2022-01-11

## 2021-07-22 ASSESSMENT — ANXIETY QUESTIONNAIRES
5. BEING SO RESTLESS THAT IT IS HARD TO SIT STILL: MORE THAN HALF THE DAYS
GAD7 TOTAL SCORE: 18
3. WORRYING TOO MUCH ABOUT DIFFERENT THINGS: NEARLY EVERY DAY
7. FEELING AFRAID AS IF SOMETHING AWFUL MIGHT HAPPEN: MORE THAN HALF THE DAYS
2. NOT BEING ABLE TO STOP OR CONTROL WORRYING: NEARLY EVERY DAY
6. BECOMING EASILY ANNOYED OR IRRITABLE: MORE THAN HALF THE DAYS
1. FEELING NERVOUS, ANXIOUS, OR ON EDGE: NEARLY EVERY DAY
IF YOU CHECKED OFF ANY PROBLEMS ON THIS QUESTIONNAIRE, HOW DIFFICULT HAVE THESE PROBLEMS MADE IT FOR YOU TO DO YOUR WORK, TAKE CARE OF THINGS AT HOME, OR GET ALONG WITH OTHER PEOPLE: VERY DIFFICULT

## 2021-07-22 ASSESSMENT — PATIENT HEALTH QUESTIONNAIRE - PHQ9
SUM OF ALL RESPONSES TO PHQ QUESTIONS 1-9: 17
5. POOR APPETITE OR OVEREATING: NEARLY EVERY DAY

## 2021-07-22 NOTE — LETTER
[facility]  07/22/21  Patient: Nani Ford  YOB: 1965  Medical Record Number: [id number]                                                                                  Non-Opioid Controlled Substance Agreement    This is an agreement between you and your provider regarding safe and appropriate use of controlled substances prescribed by your care team. Controlled substances are?medicines that can cause physical and mental dependence (abuse).     There are strict laws about having and using these medicines. We here at [facility] are  committed to working with you in your efforts to get better. To support you in this work, we'll help you schedule regular office appointments for medicine refills. If we must cancel or change your appointment for any reason, we'll make sure you have enough medicine to last until your next appointment.     As a Provider, I will:     Listen carefully to your concerns while treating you with respect.     Recommend a treatment plan that I believe is in your best interest and may involve therapies other than medicine.      Talk with you often about the possible benefits and the risk of harm of any medicine that we prescribe for you.    Assess the safety of this medicine and check how well it works.      Provide a plan on how to taper (discontinue or go off) using this medicine if the decision is made to stop its use.      ::  As a Patient, I understand controlled substances:       Are prescribed by my care provider to help me function or work and manage my condition(s).?    Are strong medicines and can cause serious side effects.       Need to be taken exactly as prescribed.?Combining controlled substances with certain medicines or chemicals (such as illegal drugs, alcohol, sedatives, sleeping pills, and benzodiazepines) can be dangerous or even fatal.? If I stop taking my medicines suddenly, I may have severe withdrawal symptoms.     The risks,  benefits, and side effects of these medicine(s) were explained to me. I agree that:    1. I will take part in other treatments as advised by my care team. This may be psychiatry or counseling, physical therapy, behavioral therapy, group treatment or a referral to specialist.    2. I will keep all my appointments and understand this is part of the monitoring of controlled substances.?My care team may require an office visit for EVERY controlled substance refill. If I miss appointments or don t follow instructions, my care team may stop my medicine    3. I will take my medicines as prescribed. I will not change the dose or schedule unless my care team tells me to. There will be no refills if I run out early.      4. I may be asked to come to the clinic and complete a urine drug test or complete a pill count. If I don t give a urine sample or participate in a pill count, the care team may stop my medicine.    5. I will only receive controlled substance prescriptions from this clinic. If I am treated by another provider, I will tell them that I am taking controlled substances and that I have a treatment agreement with this provider. I will inform my [facility] care team within one business day if I am given a prescription for any controlled substance by another healthcare provider. My [facility] care team can contact other providers and pharmacists about my use of any medicines.    6. It is up to me to make sure that I don't run out of my medicines on weekends or holidays.?If my care team is willing to refill my prescription without a visit, I must request refills only during office hours. Refills may take up to 3 business days to process. I will use one pharmacy to fill all my controlled substance prescriptions. I will notify the clinic about any changes to my insurance or medicine availability.    7. I am responsible for my prescriptions. If the medicine/prescription is lost, stolen or destroyed, it will  not be replaced.?I also agree not to share controlled substance medicines with anyone.     8. I am aware I should not use any illegal or recreational drugs. I agree not to drink alcohol unless my care team says I can.     9. If I enroll in the [place] Medical Cannabis program, I will tell my care team before my next refill.    10. I will tell my care team right away if I become pregnant, have a new medical problem treated outside of my regular clinic, or have a change in my medicines.     11. I understand that this medicine can affect my thinking, judgment and reaction time.? Alcohol and drugs affect the brain and body, which can affect the safety of my driving. Being under the influence of alcohol or drugs can affect my decision-making, behaviors, personal safety and the safety of others. Driving while impaired (DWI) can occur if a person is driving, operating or in physical control of a car, motorcycle, boat, snowmobile, ATV, motorbike, off-road vehicle or any other motor vehicle ([place] Statute 169[place].20). I understand the risk if I choose to drive or operate any vehicle or machinery.    I understand that if I do not follow any of the conditions above, my prescriptions or treatment may be stopped or changed.   I agree that my provider, clinic care team and pharmacy may work with any city, state or federal law enforcement agency that investigates the misuse, sale or other diversion of my controlled medicine. I will allow my provider to discuss my care with, or share a copy of, this agreement with any other treating provider, pharmacy or emergency room where I receive care.     I have read this agreement and have asked questions about anything I did not understand.    ________________________________________________________  Patient Signature - Nani Ford     ___________________                   Date     ________________________________________________________  Provider Signature - Tavo Mann MD        ___________________                   Date     ________________________________________________________  Witness Signature (required if provider not present while patient signing)          ___________________                   Date

## 2021-07-22 NOTE — PROGRESS NOTES
Middle insomnia, beganyears ago, happens every night, has been taking Ambien 10 mg po at bedtime as needed for insomnia.  Also takes None.  Needs medications for sleep that have been used long term. Trials of weaning have been very unsuccessful. Fully understands the desire for medication free sleep and sleep hygiene in general.    Has scleroderma and is now with a very painful lesion on her right index finger, she also has a few eye lesions.    Problem(s) Oriented visit      ROS:  General and Resp. completed and negative except as noted above     HISTORY:   reports no history of alcohol use.   reports that she quit smoking about 8 years ago. Her smoking use included cigarettes. She has a 9.00 pack-year smoking history. She has never used smokeless tobacco.    Past Medical History:   Diagnosis Date     Anemia 8/22/2013     Anxiety      Atypical chest pain      CAD (coronary artery disease)      Cannabis dependence (H)      Chronic kidney disease, stage IV (severe) (H)      Complication of transplanted kidney 11/29/2012     Fibromyalgia 10/7/2016     Former smoker      History of gout 2/12/2013     History of kidney transplant 12/17/2012     HTN (hypertension)      Hyperlipidaemia LDL goal < 100      Hyperlipidemia with target LDL less than 100      Insomnia, unspecified type 7/17/2018     Ischemic cardiomyopathy      Liver cell carcinoma (H) 9/29/2010     Liver transplant 1990 and 2012     MDD (major depressive disorder)      Obesity (BMI 35.0-39.9) with comorbidity (H) 2/13/2019     Raynaud phenomenon 8/22/2013     S/P coronary angiogram 10/10/2019     Scleroderma, diffuse (H) 8/22/2013     SOB (shortness of breath)      STEMI (ST elevation myocardial infarction) (H) 10/10/2019     Tobacco use      Past Surgical History:   Procedure Laterality Date     BIOPSY       CV CORONARY ANGIOGRAM N/A 10/10/2019    Procedure: Coronary Angiogram;  Surgeon: Rajendra De La Torre MD;  Location:  [facility]     CV  LEFT HEART CATH N/A 10/10/2019    Procedure: Left Heart Cath;  Surgeon: Rajendra De La Torre MD;  Location:  [facility]     CV PCI STENT DRUG ELUTING N/A 10/10/2019    Procedure: PCI Stent Drug Eluting;  Surgeon: Rajendra De La Torre MD;  Location:  [facility]     GI SURGERY       HEART CATH LEFT HEART CATH  10/10/2019    Successful PCI of the D2 with a [id number] Synergy drug eluting stent.     RETURN LIVER TRANSPLANT      21 years ago     TRANSPLANT         EXAM:  BP: 131/70   Pulse: 61    Temp: Data Unavailable    Wt Readings from Last 2 Encounters:   07/22/21 82.4 kg (181 lb 9.6 oz)   03/19/21 89.4 kg (197 lb)       BMI= Body mass index is 33.22 kg/m .    EXAM:  APPEARANCE: = Relaxed and in no distress  SKIN: no suspicious lesions or rashes and               Assessment/Plan:  Nani was seen today for recheck medication.    Diagnoses and all orders for this visit:    Essential hypertension  -     DE ART PRESS WAVEFORM ANALYS CENTRAL ART PRESSURE    Insomnia due to medical condition  Will decrease to 5 mg from ten/.  We discussed sleep hygiene, stimulus control and sleep restriction. He was advised to avoid caffeine within 6 hours of bed, avoid alcohol at night, avoid late meals and late exercise, avoid late physical activity. He was advised to keep his sleep environment cool, dark and quiet. He was advised to try to keep a regular sleep-wake schedule, which he does for the most part, and avoid naps, which he does. For stimulus control, he was advised to avoid being in bed for more than 20 minutes if unable to sleep. If he does get out of bed, he should keep the lights dim, read a book that is not particularly entertaining, and return to sleep if he starts feeling drowsy.     -     zolpidem (AMBIEN) 5 MG tablet; Take 1 tablet (5 mg) by mouth nightly as needed for sleep    Scleroderma, diffuse (H)    see dermDr. Munson again for finger'    Has had liver transplant.    COUNSELING:    "reports that she quit smoking about 8 years ago. Her smoking use included cigarettes. She has a 9.00 pack-year smoking history. She has never used smokeless tobacco.    Estimated body mass index is 33.22 kg/m  as calculated from the following:    Height as of 3/19/21: 1.575 m (5' 2\").    Weight as of this encounter: 82.4 kg (181 lb 9.6 oz).       Appropriate preventive services were discussed with this patient, including applicable screening as appropriate for cardiovascular disease, diabetes, osteopenia/osteoporosis, and glaucoma.  As appropriate for age/gender, discussed screening for colorectal cancer, prostate cancer, breast cancer, and cervical cancer. Checklist reviewing preventive services available has been given to the patient.    Reviewed patients plan of care and provided an AVS. The Basic Care Plan (routine screening as documented in Health Maintenance) for Nani meets the Care Plan requirement. This Care Plan has been established and reviewed with the  Patient.      The following health maintenance items are reviewed in Epic and correct as of today:  Health Maintenance   Topic Date Due     ADVANCE CARE PLANNING  Never done     HIV SCREENING  Never done     MEDICARE ANNUAL WELLNESS VISIT  Never done     HEPATITIS C SCREENING  Never done     ZOSTER IMMUNIZATION (1 of 2) Never done     MAMMO SCREENING  11/15/2018     COLORECTAL CANCER SCREENING  01/12/2019     PHQ-9  06/18/2020     INFLUENZA VACCINE (1) 09/01/2021     DTAP/TDAP/TD IMMUNIZATION (3 - Td or Tdap) 11/15/2021     LIPID  10/18/2024     Pneumococcal Vaccine: Pediatrics (0 to 5 Years) and At-Risk Patients (6 to 64 Years) (2 of 2 - PPSV23) 09/25/2032     DEPRESSION ACTION PLAN  Completed     COVID-19 Vaccine  Completed     IPV IMMUNIZATION  Aged Out     MENINGITIS IMMUNIZATION  Aged Out     HEPATITIS B IMMUNIZATION  Aged Out     PAP  Discontinued       Tavo Mann  [facility]  For any issues my office # is [phone]  "

## 2021-07-23 ASSESSMENT — ANXIETY QUESTIONNAIRES: GAD7 TOTAL SCORE: 18

## 2021-08-13 DIAGNOSIS — R51.9 NONINTRACTABLE HEADACHE, UNSPECIFIED CHRONICITY PATTERN, UNSPECIFIED HEADACHE TYPE: ICD-10-CM

## 2021-08-13 RX ORDER — BUTALBITAL, ACETAMINOPHEN AND CAFFEINE 50; 325; 40 MG/1; MG/1; MG/1
TABLET ORAL
Qty: 30 TABLET | Refills: 3 | Status: SHIPPED | OUTPATIENT
Start: 2021-08-13 | End: 2021-10-04

## 2021-10-04 DIAGNOSIS — R51.9 NONINTRACTABLE HEADACHE, UNSPECIFIED CHRONICITY PATTERN, UNSPECIFIED HEADACHE TYPE: ICD-10-CM

## 2021-10-04 RX ORDER — BUTALBITAL, ACETAMINOPHEN AND CAFFEINE 50; 325; 40 MG/1; MG/1; MG/1
TABLET ORAL
Qty: 30 TABLET | Refills: 0 | Status: SHIPPED | OUTPATIENT
Start: 2021-10-04 | End: 2021-10-28

## 2021-10-07 DIAGNOSIS — C44.42 SQUAMOUS CELL CARCINOMA OF SCALP: Primary | ICD-10-CM

## 2021-10-07 NOTE — TELEPHONE ENCOUNTER
Patient called clinic requesting medication for pain for Mohs procedure done yesterday, she was give #5 tramadol 25mg which she reports did not help with pain. Dermatology advised patient to call PCP for pain medication. Per Dr. Mann OK for Norco 5-325mg #12-medication entered and routed to Dr. Mann for approval. Patient advised to call clinic with questions or concerns. Ericka Brown

## 2021-10-08 RX ORDER — HYDROCODONE BITARTRATE AND ACETAMINOPHEN 5; 325 MG/1; MG/1
1 TABLET ORAL EVERY 6 HOURS PRN
Qty: 12 TABLET | Refills: 0 | Status: SHIPPED | OUTPATIENT
Start: 2021-10-08 | End: 2021-10-11

## 2021-10-26 DIAGNOSIS — R51.9 NONINTRACTABLE HEADACHE, UNSPECIFIED CHRONICITY PATTERN, UNSPECIFIED HEADACHE TYPE: ICD-10-CM

## 2021-10-28 RX ORDER — BUTALBITAL, ACETAMINOPHEN AND CAFFEINE 50; 325; 40 MG/1; MG/1; MG/1
TABLET ORAL
Qty: 30 TABLET | Refills: 3 | Status: SHIPPED | OUTPATIENT
Start: 2021-10-28 | End: 2021-12-23

## 2021-12-22 DIAGNOSIS — R51.9 NONINTRACTABLE HEADACHE, UNSPECIFIED CHRONICITY PATTERN, UNSPECIFIED HEADACHE TYPE: ICD-10-CM

## 2021-12-22 NOTE — TELEPHONE ENCOUNTER
"Butalbital-acetaminophen-caffeine  LOV 5/4/20 related   LOV 7/22/21     Nonintractable headache, unspecified chronicity pattern, unspecified headache type  Headaches are not unilateral.  No vision changes.  No \"aura\" before headaches.   Denies nausea, vomiting, phonophobia, or photophobia.      - butalbital-acetaminophen-caffeine (ESGIC) -40 MG tablet; TAKE 1 TABLET BY MOUTH EVERY 4 HOURS AS NEEDED FOR HEADACHES  Dispense: 30 tablet; Refill: 3  "

## 2021-12-23 RX ORDER — BUTALBITAL, ACETAMINOPHEN AND CAFFEINE 50; 325; 40 MG/1; MG/1; MG/1
TABLET ORAL
Qty: 30 TABLET | Refills: 1 | Status: SHIPPED | OUTPATIENT
Start: 2021-12-23 | End: 2022-02-08

## 2022-01-11 DIAGNOSIS — G47.01 INSOMNIA DUE TO MEDICAL CONDITION: ICD-10-CM

## 2022-01-11 RX ORDER — ZOLPIDEM TARTRATE 5 MG/1
TABLET ORAL
Qty: 30 TABLET | Refills: 3 | Status: SHIPPED | OUTPATIENT
Start: 2022-01-11 | End: 2022-05-05

## 2022-01-20 DIAGNOSIS — Z76.0 ENCOUNTER FOR MEDICATION REFILL: ICD-10-CM

## 2022-01-21 RX ORDER — AMLODIPINE BESYLATE 5 MG/1
TABLET ORAL
Qty: 90 TABLET | Refills: 3 | Status: SHIPPED | OUTPATIENT
Start: 2022-01-21 | End: 2022-06-02

## 2022-02-08 DIAGNOSIS — R51.9 NONINTRACTABLE HEADACHE, UNSPECIFIED CHRONICITY PATTERN, UNSPECIFIED HEADACHE TYPE: ICD-10-CM

## 2022-02-10 RX ORDER — BUTALBITAL, ACETAMINOPHEN AND CAFFEINE 50; 325; 40 MG/1; MG/1; MG/1
TABLET ORAL
Qty: 30 TABLET | Refills: 0 | Status: SHIPPED | OUTPATIENT
Start: 2022-02-10 | End: 2022-04-06

## 2022-02-18 ENCOUNTER — TELEPHONE (OUTPATIENT)
Dept: FAMILY MEDICINE | Facility: CLINIC | Age: 57
End: 2022-02-18

## 2022-03-15 DIAGNOSIS — Z94.4 TRANSPLANTED LIVER (H): Primary | ICD-10-CM

## 2022-03-15 DIAGNOSIS — Z79.899 ENCOUNTER FOR LONG-TERM (CURRENT) USE OF MEDICATIONS: ICD-10-CM

## 2022-03-15 NOTE — PROGRESS NOTES
lab standing orders from Dr Dakotah Up from [facility] fax [phone]    No appts at this time  Ashley Berry MA March 15, 2022 12:16 PM

## 2022-04-06 DIAGNOSIS — R51.9 NONINTRACTABLE HEADACHE, UNSPECIFIED CHRONICITY PATTERN, UNSPECIFIED HEADACHE TYPE: ICD-10-CM

## 2022-04-06 NOTE — TELEPHONE ENCOUNTER
ECGIC  LOV 7/22/21  Next appointment 4/11/22  Last fill 2/10/22  Component      Latest Ref Rng & Units 3/16/2021   Glucose      65 - 99 mg/dL 86   Urea Nitrogen      6 - 24 mg/dL 37 (H)   Creatinine      0.57 - 1.00 mg/dL 1.12 (H)   eGFR If NonAfricn Am      >59 mL/min/1.73 56 (L)   eGFR If Africn Am      >59 mL/min/1.73 65   BUN/Creatinine Ratio      9 - 23 33 (H)   Sodium      134 - 144 mmol/L 143   Potassium      3.5 - 5.2 mmol/L 5.4 (H)   Chloride      96 - 106 mmol/L 105   Total CO2      20 - 29 mmol/L 26   Calcium      8.7 - 10.2 mg/dL 10.1   Protein Total      6.0 - 8.5 g/dL 7.3   Albumin      3.8 - 4.9 g/dL 4.3   Globulin, Total      1.5 - 4.5 g/dL 3.0   A/G Ratio      1.2 - 2.2 1.4   Bilirubin Total      0.0 - 1.2 mg/dL 0.2   Alkaline Phosphatase      39 - 117 IU/L 56   AST      0 - 40 IU/L 14   ALT      0 - 32 IU/L 9   WBC      3.8 - 11.0 x10/cmm 7.9   % Lymphocytes      20.5 - 51.1 % 27.1   % Monocytes      1.7 - 9.3 % 6.3   % Granulocytes      42.2 - 75.2 % 66.6   RBC x10/cmm      3.7 - 5.2 x10/cmm 5.36 (A)   Hemoglobin      11.8 - 15.5 g/dl 14.3   Hematocrit      35 - 46 % 43.2   MCV      80 - 100 fL 80.5   MCH      27.0 - 31.0 pg 26.7 (A)   MCHC      33.0 - 37.0 g/dL 33.1   Platelet Count      140 - 450 K/uL 257

## 2022-04-07 RX ORDER — BUTALBITAL, ACETAMINOPHEN AND CAFFEINE 50; 325; 40 MG/1; MG/1; MG/1
TABLET ORAL
Qty: 30 TABLET | Refills: 1 | Status: SHIPPED | OUTPATIENT
Start: 2022-04-07 | End: 2022-05-05

## 2022-04-14 ENCOUNTER — OFFICE VISIT (OUTPATIENT)
Dept: FAMILY MEDICINE | Facility: CLINIC | Age: 57
End: 2022-04-14

## 2022-04-14 VITALS
RESPIRATION RATE: 16 BRPM | SYSTOLIC BLOOD PRESSURE: 155 MMHG | HEART RATE: 59 BPM | DIASTOLIC BLOOD PRESSURE: 72 MMHG | OXYGEN SATURATION: 97 % | WEIGHT: 161 LBS | HEIGHT: 62 IN | BODY MASS INDEX: 29.63 KG/M2

## 2022-04-14 DIAGNOSIS — Z94.0 KIDNEY REPLACED BY TRANSPLANT: ICD-10-CM

## 2022-04-14 DIAGNOSIS — Z12.31 ENCOUNTER FOR SCREENING MAMMOGRAM FOR MALIGNANT NEOPLASM OF BREAST: ICD-10-CM

## 2022-04-14 DIAGNOSIS — M34.9 SCLERODERMA, DIFFUSE (H): ICD-10-CM

## 2022-04-14 DIAGNOSIS — I73.00 RAYNAUD'S PHENOMENON WITHOUT GANGRENE: ICD-10-CM

## 2022-04-14 DIAGNOSIS — G47.00 INSOMNIA, UNSPECIFIED TYPE: ICD-10-CM

## 2022-04-14 DIAGNOSIS — Z12.11 SCREEN FOR COLON CANCER: Primary | ICD-10-CM

## 2022-04-14 DIAGNOSIS — I25.10 CORONARY ARTERY DISEASE INVOLVING NATIVE CORONARY ARTERY OF NATIVE HEART WITHOUT ANGINA PECTORIS: ICD-10-CM

## 2022-04-14 DIAGNOSIS — Z23 NEED FOR TETANUS BOOSTER: ICD-10-CM

## 2022-04-14 DIAGNOSIS — Z94.4 LIVER REPLACED BY TRANSPLANT (H): ICD-10-CM

## 2022-04-14 DIAGNOSIS — F33.42 RECURRENT MAJOR DEPRESSIVE DISORDER, IN FULL REMISSION (H): ICD-10-CM

## 2022-04-14 DIAGNOSIS — I10 PRIMARY HYPERTENSION: ICD-10-CM

## 2022-04-14 PROBLEM — E66.01 MORBID OBESITY (H): Status: RESOLVED | Noted: 2019-02-13 | Resolved: 2022-04-14

## 2022-04-14 PROBLEM — I21.3 STEMI (ST ELEVATION MYOCARDIAL INFARCTION) (H): Status: RESOLVED | Noted: 2019-10-10 | Resolved: 2022-04-14

## 2022-04-14 LAB
% GRANULOCYTES: 78.2 % (ref 42.2–75.2)
CHOL/HDL RATIO (RMG): 5.1 MG/DL (ref 0–4.5)
CHOLESTEROL: 266 MG/DL (ref 100–199)
HCT VFR BLD AUTO: 43.5 % (ref 35–46)
HDL (RMG): 52 MG/DL (ref 40–?)
HEMOGLOBIN: 14.2 G/DL (ref 11.8–15.5)
LDL CALCULATED (RMG): 189 MG/DL (ref 0–130)
LYMPHOCYTES NFR BLD AUTO: 17.2 % (ref 20.5–51.1)
MCH RBC QN AUTO: 28.7 PG (ref 27–31)
MCHC RBC AUTO-ENTMCNC: 32.7 G/DL (ref 33–37)
MCV RBC AUTO: 87.9 FL (ref 80–100)
MONOCYTES NFR BLD AUTO: 4.6 % (ref 1.7–9.3)
PLATELET # BLD AUTO: 259 K/UL (ref 140–450)
RBC # BLD AUTO: 4.95 X10/CMM (ref 3.7–5.2)
TRIGLYCERIDES (RMG): 120 MG/DL (ref 0–149)
WBC # BLD AUTO: 8.1 X10/CMM (ref 3.8–11)

## 2022-04-14 PROCEDURE — 85025 COMPLETE CBC W/AUTO DIFF WBC: CPT | Performed by: FAMILY MEDICINE

## 2022-04-14 PROCEDURE — 36415 COLL VENOUS BLD VENIPUNCTURE: CPT | Performed by: FAMILY MEDICINE

## 2022-04-14 PROCEDURE — 93050 ART PRESSURE WAVEFORM ANALYS: CPT | Performed by: FAMILY MEDICINE

## 2022-04-14 PROCEDURE — 80061 LIPID PANEL: CPT | Mod: QW | Performed by: FAMILY MEDICINE

## 2022-04-14 PROCEDURE — 99214 OFFICE O/P EST MOD 30 MIN: CPT | Mod: 25 | Performed by: FAMILY MEDICINE

## 2022-04-14 PROCEDURE — 90471 IMMUNIZATION ADMIN: CPT | Mod: 59 | Performed by: FAMILY MEDICINE

## 2022-04-14 PROCEDURE — 90715 TDAP VACCINE 7 YRS/> IM: CPT | Performed by: FAMILY MEDICINE

## 2022-04-14 RX ORDER — ATORVASTATIN CALCIUM 40 MG/1
40 TABLET, FILM COATED ORAL DAILY
Qty: 30 TABLET | Refills: 0 | Status: SHIPPED | OUTPATIENT
Start: 2022-04-14 | End: 2022-04-15

## 2022-04-14 ASSESSMENT — PATIENT HEALTH QUESTIONNAIRE - PHQ9
SUM OF ALL RESPONSES TO PHQ QUESTIONS 1-9: 12
5. POOR APPETITE OR OVEREATING: MORE THAN HALF THE DAYS

## 2022-04-14 ASSESSMENT — ANXIETY QUESTIONNAIRES
3. WORRYING TOO MUCH ABOUT DIFFERENT THINGS: MORE THAN HALF THE DAYS
5. BEING SO RESTLESS THAT IT IS HARD TO SIT STILL: MORE THAN HALF THE DAYS
IF YOU CHECKED OFF ANY PROBLEMS ON THIS QUESTIONNAIRE, HOW DIFFICULT HAVE THESE PROBLEMS MADE IT FOR YOU TO DO YOUR WORK, TAKE CARE OF THINGS AT HOME, OR GET ALONG WITH OTHER PEOPLE: SOMEWHAT DIFFICULT
6. BECOMING EASILY ANNOYED OR IRRITABLE: MORE THAN HALF THE DAYS
7. FEELING AFRAID AS IF SOMETHING AWFUL MIGHT HAPPEN: MORE THAN HALF THE DAYS
2. NOT BEING ABLE TO STOP OR CONTROL WORRYING: MORE THAN HALF THE DAYS
GAD7 TOTAL SCORE: 14
1. FEELING NERVOUS, ANXIOUS, OR ON EDGE: MORE THAN HALF THE DAYS

## 2022-04-14 NOTE — PROGRESS NOTES
She  had a history of obesity.  We have reviewed her weight graphs together.   Their current BMI of Body mass index is 29.45 kg/m . is between 35-39.99 and they have the related comorbidity of HTN, Hypercholesteralemia and CAD (please see further documentation elsewhere in this note) they are categorized as having Morbid Obesity.    Reviewed previous attempts at weight loss which have not been successful in producing prolonged weigth loss.   Discussed current eating and exercise habits.     Reviewed weights in chart:  Wt Readings from Last 5 Encounters:   04/14/22 73 kg (161 lb)   07/22/21 82.4 kg (181 lb 9.6 oz)   03/19/21 89.4 kg (197 lb)   05/04/20 87.1 kg (192 lb)   11/12/19 81.3 kg (179 lb 3.2 oz)      BMI Readings from Last 5 Encounters:   04/14/22 29.45 kg/m    07/22/21 33.22 kg/m    03/19/21 36.03 kg/m    05/04/20 35.12 kg/m    11/12/19 32.78 kg/m      Needs medications for sleep that have been used long term. Trials of weaning have been very unsuccessful. Fully understands the desire for medication free sleep and sleep hygiene in general.    Hyperlipidemia:  Has history of hyperlipidemia.    The patient is nottaking a medication for this.  Denies any significant side effects from his medication.      Latest labs reviewed:    Recent Labs   Lab Test 10/18/19  0937 10/10/19  0446   CHOL 142 204*   HDL 45* 47*   LDL 70 144*   TRIG 133 66        Lab Results   Component Value Date    AST 14 03/16/2021      Problem(s) Oriented visit      ROS:  General and CV completed and negative except as noted above     HISTORY:   reports no history of alcohol use.   reports that she quit smoking about 9 years ago. Her smoking use included cigarettes. She has a 9.00 pack-year smoking history. She has never used smokeless tobacco.    Past Medical History:   Diagnosis Date     Anemia 8/22/2013     Anxiety      Cannabis dependence (H)      Chronic kidney disease, stage IV (severe) (H)      Complication of transplanted kidney  11/29/2012     Fibromyalgia 10/7/2016     Former smoker      History of gout 2/12/2013     History of kidney transplant 12/17/2012     HTN (hypertension)      Hyperlipidemia with target LDL less than 100      Insomnia, unspecified type 7/17/2018     Liver cell carcinoma (H) 9/29/2010     Liver transplant 1990 and 2012     MDD (major depressive disorder)      Obesity (BMI 35.0-39.9) with comorbidity (H) 2/13/2019     Raynaud phenomenon 8/22/2013     S/P coronary angiogram 10/10/2019     Scleroderma, diffuse (H) 8/22/2013     STEMI (ST elevation myocardial infarction) (H) 10/10/2019     Tobacco use      Past Surgical History:   Procedure Laterality Date     BIOPSY       CV CORONARY ANGIOGRAM N/A 10/10/2019    Procedure: Coronary Angiogram;  Surgeon: Rajendra De La Torre MD;  Location:  HE[facility]ARDI[facility] CATH LAB     CV LEFT HEART CATH N/A 10/10/2019    Procedure: Left Heart Cath;  Surgeon: Rajendra De La Torre MD;  Location:  HE[facility]ARD[facility] CATH LAB     CV PCI STENT DRUG ELUTING N/A 10/10/2019    Procedure: PCI Stent Drug Eluting;  Surgeon: Rajendra De La oTrre MD;  Location:  HEART CARDI[facility] CATH LAB     GI SURGERY       HEART CATH LEFT HEART CATH  10/10/2019    Successful PCI of the D2 with a [id number] Synergy drug eluting stent.     RETURN LIVER TRANSPLANT      21 years ago     TRANSPLANT         EXAM:  BP: 155/72[atcor[   Pulse: 59    Temp: Data Unavailable    Wt Readings from Last 2 Encounters:   04/14/22 73 kg (161 lb)   07/22/21 82.4 kg (181 lb 9.6 oz)       BMI= Body mass index is 29.45 kg/m .    EXAM:  APPEARANCE: = Relaxed and in no distress  Conj/Eyelids = noninjected and lids and lashes are without inflammation  PERRLA/Irises = Pupils Round Reactive to Light and Irisis without inflammation  Neck = No anterior or posterior adenopathy appreciated.  Thyroid = Not enlarged and no masses felt  Resp effort = Calm regular breathing  Breath Sounds = Good air movement with no rales or rhonchi in any lung  fields  Heart Rate, Rythym, & sounds (no Murm)  = Regular rate and rythym with no S3, S4, or murmer appreciated.  Carotid Art's = Pulses full and equal and no bruits appreciated  Abdomen = Soft, nontender, no masses, & bowel sounds in all quadrants  Liver/Spleen = Normal span and no splenomegaly noted  Digits and Nails = FROM in all finger joints, no nail dystrophy  Ext (edema) = No pretibial edema noted or elsewhere  Musculsktl =  Strength and ROM of major joints are within normal limits  SKIN = absent significant rashes or lesions   Recent/Remote Memory = Alert and Oriented x 3  Mood/Affect = Cooperative and interested      Assessment/Plan:  Nani was seen today for recheck medication.    Diagnoses and all orders for this visit:    Screen for colon cancer  -     COLOGUARHODA([employer])    Encounter for screening mammogram for malignant neoplasm of breast  -     MA Screening Digital Bilateral; Future    Need for tetanus booster  -     TDAP (ADACEL,BOOSTRIX)  -     VACCINE ADMINISTRATION, INITIAL  -     MT ART PRESS WAVEFORM ANALYS CENTRAL ART PRESSURE    Primary hypertension  Reviewed her current HTN management. Discussed our goal for her is a systolic pressure at or below 128 and diastolic pressure at or below 83.  We today managed her prescriptions with refills ensured to ensure availabilty of current medications.  Discussed the importance for aggressive management of HTN to prevent vascular complications later.  Recommended lower fat, lower carbohydrate, and lower sodium (<2000 mg)diet. Required intervals for follow up on HTN, lab studies reviewed.    Strongly recommened she follow her blood pressures outside the clinic to ensure that BPs are remaining within guidelines,.  Instructed to contact me if the readings are not within guidelines on a regular basis so we can adjust treatment as needed.    -     CBC with Diff/Plt ([facility])  -     Comp. Metabolic Panel (14) ([facility])  -     Lipid Panel  "([facility])    Coronary artery disease involving native coronary artery of native heart without angina pectoris  Needs a statin.-     Lipid Panel ([facility])    Liver replaced by transplant (H)  Known issue that I take into account for her medical decisions, no current exacerbations or new concerns.     Kidney replaced by transplant  Known issue that I take into account for her medical decisions, no current exacerbations or new concerns.      Scleroderma, diffuse (H)  No major issues    Raynaud's phenomenon without gangrene  Takes precautions.    Insomnia, unspecified type  Takes every day  Recurrent major depressive disorder, in full remission (H)  Under good control  Other orders  -     atorvastatin (LIPITOR) 40 MG tablet; Take 1 tablet (40 mg) by mouth daily        COUNSELING:   reports that she quit smoking about 9 years ago. Her smoking use included cigarettes. She has a 9.00 pack-year smoking history. She has never used smokeless tobacco.    Estimated body mass index is 29.45 kg/m  as calculated from the following:    Height as of this encounter: 1.575 m (5' 2\").    Weight as of this encounter: 73 kg (161 lb).       Appropriate preventive services were discussed with this patient, including applicable screening as appropriate for cardiovascular disease, diabetes, osteopenia/osteoporosis, and glaucoma.  As appropriate for age/gender, discussed screening for colorectal cancer, prostate cancer, breast cancer, and cervical cancer. Checklist reviewing preventive services available has been given to the patient.    Reviewed patients plan of care and provided an AVS. The Basic Care Plan (routine screening as documented in Health Maintenance) for Nani meets the Care Plan requirement. This Care Plan has been established and reviewed with the  Patient.      The following health maintenance items are reviewed in Epic and correct as of today:  Health Maintenance   Topic Date Due     ADVANCE CARE PLANNING  Never done     " HIV SCREENING  Never done     MEDICARE ANNUAL WELLNESS VISIT  Never done     HEPATITIS C SCREENING  Never done     LUNG CANCER SCREENING  Never done     MAMMO SCREENING  11/15/2018     COLORECTAL CANCER SCREENING  01/12/2019     INFLUENZA VACCINE (1) 09/01/2021     DTAP/TDAP/TD IMMUNIZATION (3 - Td or Tdap) 11/15/2021     PHQ-9  01/22/2022     COVID-19 Vaccine (4 - Booster for Pfizer series) 03/02/2022     ZOSTER IMMUNIZATION (2 of 2) 05/16/2022     LIPID  10/18/2024     Pneumococcal Vaccine: Pediatrics (0 to 5 Years) and At-Risk Patients (6 to 64 Years) (4 of 4 - PPSV23) 09/25/2032     DEPRESSION ACTION PLAN  Completed     IPV IMMUNIZATION  Aged Out     MENINGITIS IMMUNIZATION  Aged Out     HEPATITIS B IMMUNIZATION  Aged Out     PAP  Discontinued       Tavo Mann  [facility]  For any issues my office # is [phone]

## 2022-04-15 LAB
ALBUMIN SERPL-MCNC: 4.5 G/DL (ref 3.8–4.9)
ALBUMIN/GLOB SERPL: 1.7 {RATIO} (ref 1.2–2.2)
ALP SERPL-CCNC: 52 IU/L (ref 44–121)
ALT SERPL-CCNC: 30 IU/L (ref 0–32)
AST SERPL-CCNC: 21 IU/L (ref 0–40)
BILIRUB SERPL-MCNC: <0.2 MG/DL (ref 0–1.2)
BUN SERPL-MCNC: 25 MG/DL (ref 6–24)
BUN/CREATININE RATIO: 24 (ref 9–23)
CALCIUM SERPL-MCNC: 9.5 MG/DL (ref 8.7–10.2)
CHLORIDE SERPLBLD-SCNC: 102 MMOL/L (ref 96–106)
CREAT SERPL-MCNC: 1.03 MG/DL (ref 0.57–1)
EGFR: 65 ML/MIN/1.73
GLOBULIN, TOTAL: 2.6 G/DL (ref 1.5–4.5)
GLUCOSE SERPL-MCNC: 99 MG/DL (ref 65–99)
POTASSIUM SERPL-SCNC: 4.5 MMOL/L (ref 3.5–5.2)
PROT SERPL-MCNC: 7.1 G/DL (ref 6–8.5)
SODIUM SERPL-SCNC: 140 MMOL/L (ref 134–144)
TOTAL CO2: 21 MMOL/L (ref 20–29)

## 2022-04-15 RX ORDER — ATORVASTATIN CALCIUM 40 MG/1
40 TABLET, FILM COATED ORAL DAILY
Qty: 30 TABLET | Refills: 6 | Status: SHIPPED | OUTPATIENT
Start: 2022-04-15 | End: 2022-11-28

## 2022-04-15 ASSESSMENT — ANXIETY QUESTIONNAIRES: GAD7 TOTAL SCORE: 14

## 2022-04-28 DIAGNOSIS — K21.9 GASTROESOPHAGEAL REFLUX DISEASE WITHOUT ESOPHAGITIS: ICD-10-CM

## 2022-04-28 RX ORDER — OMEPRAZOLE 40 MG/1
CAPSULE, DELAYED RELEASE ORAL
Qty: 180 CAPSULE | Refills: 0 | Status: SHIPPED | OUTPATIENT
Start: 2022-04-28 | End: 2022-09-05

## 2022-05-05 DIAGNOSIS — R51.9 NONINTRACTABLE HEADACHE, UNSPECIFIED CHRONICITY PATTERN, UNSPECIFIED HEADACHE TYPE: ICD-10-CM

## 2022-05-05 DIAGNOSIS — G47.01 INSOMNIA DUE TO MEDICAL CONDITION: ICD-10-CM

## 2022-05-05 RX ORDER — BUTALBITAL, ACETAMINOPHEN AND CAFFEINE 50; 325; 40 MG/1; MG/1; MG/1
TABLET ORAL
Qty: 30 TABLET | Refills: 3 | Status: SHIPPED | OUTPATIENT
Start: 2022-05-05 | End: 2022-07-12

## 2022-05-05 RX ORDER — ZOLPIDEM TARTRATE 5 MG/1
TABLET ORAL
Qty: 30 TABLET | Refills: 3 | Status: SHIPPED | OUTPATIENT
Start: 2022-05-05 | End: 2022-08-31

## 2022-06-02 ENCOUNTER — MYC MEDICAL ADVICE (OUTPATIENT)
Dept: FAMILY MEDICINE | Facility: CLINIC | Age: 57
End: 2022-06-02

## 2022-06-02 DIAGNOSIS — I10 ESSENTIAL HYPERTENSION: ICD-10-CM

## 2022-06-02 DIAGNOSIS — Z76.0 ENCOUNTER FOR MEDICATION REFILL: ICD-10-CM

## 2022-06-02 RX ORDER — METOPROLOL SUCCINATE 50 MG/1
50 TABLET, EXTENDED RELEASE ORAL DAILY
Qty: 90 TABLET | Refills: 3 | Status: SHIPPED | OUTPATIENT
Start: 2022-06-02 | End: 2023-05-23

## 2022-06-02 RX ORDER — AMLODIPINE BESYLATE 5 MG/1
5 TABLET ORAL DAILY
Qty: 90 TABLET | Refills: 3 | Status: SHIPPED | OUTPATIENT
Start: 2022-06-02 | End: 2023-05-23

## 2022-06-02 NOTE — TELEPHONE ENCOUNTER
Patient pharmacy coverage changed and patient now wants to fill prescription at local pharmacy-[facility]. Patient last office visit 4/14/22 with fasting labs. Per Dr. Mann prescription sent to pharmacy for 90 day supply with 3 refills. Ericka Brown

## 2022-07-11 DIAGNOSIS — R51.9 NONINTRACTABLE HEADACHE, UNSPECIFIED CHRONICITY PATTERN, UNSPECIFIED HEADACHE TYPE: ICD-10-CM

## 2022-07-11 NOTE — TELEPHONE ENCOUNTER
butalbital-acetaminophen-caffeine (ESGIC) -40 MG    LOV 4/14/22  Last labs 4/14/22    Due soon for CSA expires 7/27/22    No future appt

## 2022-07-12 RX ORDER — BUTALBITAL, ACETAMINOPHEN AND CAFFEINE 50; 325; 40 MG/1; MG/1; MG/1
TABLET ORAL
Qty: 30 TABLET | Refills: 1 | Status: SHIPPED | OUTPATIENT
Start: 2022-07-12 | End: 2022-08-12

## 2022-08-12 DIAGNOSIS — R51.9 NONINTRACTABLE HEADACHE, UNSPECIFIED CHRONICITY PATTERN, UNSPECIFIED HEADACHE TYPE: ICD-10-CM

## 2022-08-12 RX ORDER — BUTALBITAL, ACETAMINOPHEN AND CAFFEINE 50; 325; 40 MG/1; MG/1; MG/1
TABLET ORAL
Qty: 30 TABLET | Refills: 0 | Status: SHIPPED | OUTPATIENT
Start: 2022-08-12 | End: 2022-08-31

## 2022-08-12 NOTE — TELEPHONE ENCOUNTER
Butalbital-acetaminophen-caffeine.   LOV 22. Last refill 22.     Due for CSA,  22. No future appt.

## 2022-08-31 DIAGNOSIS — R51.9 NONINTRACTABLE HEADACHE, UNSPECIFIED CHRONICITY PATTERN, UNSPECIFIED HEADACHE TYPE: ICD-10-CM

## 2022-08-31 DIAGNOSIS — G47.01 INSOMNIA DUE TO MEDICAL CONDITION: ICD-10-CM

## 2022-08-31 RX ORDER — ZOLPIDEM TARTRATE 5 MG/1
TABLET ORAL
Qty: 30 TABLET | Refills: 1 | Status: SHIPPED | OUTPATIENT
Start: 2022-08-31 | End: 2022-09-08

## 2022-08-31 RX ORDER — BUTALBITAL, ACETAMINOPHEN AND CAFFEINE 50; 325; 40 MG/1; MG/1; MG/1
TABLET ORAL
Qty: 30 TABLET | Refills: 1 | Status: SHIPPED | OUTPATIENT
Start: 2022-08-31 | End: 2022-09-08

## 2022-09-04 DIAGNOSIS — K21.9 GASTROESOPHAGEAL REFLUX DISEASE WITHOUT ESOPHAGITIS: ICD-10-CM

## 2022-09-05 RX ORDER — OMEPRAZOLE 40 MG/1
CAPSULE, DELAYED RELEASE ORAL
Qty: 180 CAPSULE | Refills: 0 | Status: SHIPPED | OUTPATIENT
Start: 2022-09-05 | End: 2022-09-08

## 2022-09-08 ENCOUNTER — MYC REFILL (OUTPATIENT)
Dept: FAMILY MEDICINE | Facility: CLINIC | Age: 57
End: 2022-09-08

## 2022-09-08 DIAGNOSIS — K21.9 GASTROESOPHAGEAL REFLUX DISEASE WITHOUT ESOPHAGITIS: ICD-10-CM

## 2022-09-08 RX ORDER — ZOLPIDEM TARTRATE 5 MG/1
5 TABLET ORAL
Qty: 30 TABLET | Refills: 1 | Status: SHIPPED | OUTPATIENT
Start: 2022-09-08 | End: 2022-11-28

## 2022-09-08 RX ORDER — BUTALBITAL, ACETAMINOPHEN AND CAFFEINE 50; 325; 40 MG/1; MG/1; MG/1
1 TABLET ORAL EVERY 4 HOURS PRN
Qty: 30 TABLET | Refills: 1 | Status: SHIPPED | OUTPATIENT
Start: 2022-09-08 | End: 2022-10-20

## 2022-09-08 RX ORDER — OMEPRAZOLE 40 MG/1
40 CAPSULE, DELAYED RELEASE ORAL 2 TIMES DAILY
Qty: 180 CAPSULE | Refills: 0 | Status: SHIPPED | OUTPATIENT
Start: 2022-09-08 | End: 2022-12-02

## 2022-09-08 NOTE — TELEPHONE ENCOUNTER
Prescription transmission to pharmacy failed. Prescription re-entered and routed to Dr Mann for approval. Ericka Brown

## 2022-10-19 DIAGNOSIS — R51.9 NONINTRACTABLE HEADACHE, UNSPECIFIED CHRONICITY PATTERN, UNSPECIFIED HEADACHE TYPE: ICD-10-CM

## 2022-10-20 RX ORDER — BUTALBITAL, ACETAMINOPHEN AND CAFFEINE 50; 325; 40 MG/1; MG/1; MG/1
TABLET ORAL
Qty: 30 TABLET | Refills: 3 | Status: SHIPPED | OUTPATIENT
Start: 2022-10-20 | End: 2022-12-19

## 2022-11-27 DIAGNOSIS — I25.10 CORONARY ARTERY DISEASE INVOLVING NATIVE CORONARY ARTERY OF NATIVE HEART WITHOUT ANGINA PECTORIS: ICD-10-CM

## 2022-11-27 DIAGNOSIS — G47.01 INSOMNIA DUE TO MEDICAL CONDITION: ICD-10-CM

## 2022-11-28 RX ORDER — ATORVASTATIN CALCIUM 40 MG/1
TABLET, FILM COATED ORAL
Qty: 30 TABLET | Refills: 6 | Status: SHIPPED | OUTPATIENT
Start: 2022-11-28 | End: 2023-07-20

## 2022-11-28 RX ORDER — ZOLPIDEM TARTRATE 5 MG/1
TABLET ORAL
Qty: 30 TABLET | Refills: 3 | Status: SHIPPED | OUTPATIENT
Start: 2022-11-28 | End: 2023-03-26

## 2022-12-02 DIAGNOSIS — K21.9 GASTROESOPHAGEAL REFLUX DISEASE WITHOUT ESOPHAGITIS: ICD-10-CM

## 2022-12-02 RX ORDER — OMEPRAZOLE 40 MG/1
CAPSULE, DELAYED RELEASE ORAL
Qty: 180 CAPSULE | Refills: 0 | Status: SHIPPED | OUTPATIENT
Start: 2022-12-02 | End: 2022-12-09

## 2022-12-09 DIAGNOSIS — K21.9 GASTROESOPHAGEAL REFLUX DISEASE WITHOUT ESOPHAGITIS: ICD-10-CM

## 2022-12-09 RX ORDER — OMEPRAZOLE 40 MG/1
CAPSULE, DELAYED RELEASE ORAL
Qty: 180 CAPSULE | Refills: 0 | Status: SHIPPED | OUTPATIENT
Start: 2022-12-09 | End: 2023-03-15

## 2022-12-19 DIAGNOSIS — R51.9 NONINTRACTABLE HEADACHE, UNSPECIFIED CHRONICITY PATTERN, UNSPECIFIED HEADACHE TYPE: ICD-10-CM

## 2022-12-19 RX ORDER — BUTALBITAL, ACETAMINOPHEN AND CAFFEINE 50; 325; 40 MG/1; MG/1; MG/1
TABLET ORAL
Qty: 30 TABLET | Refills: 3 | Status: SHIPPED | OUTPATIENT
Start: 2022-12-19 | End: 2023-02-14

## 2023-02-10 DIAGNOSIS — R51.9 NONINTRACTABLE HEADACHE, UNSPECIFIED CHRONICITY PATTERN, UNSPECIFIED HEADACHE TYPE: ICD-10-CM

## 2023-02-14 RX ORDER — BUTALBITAL, ACETAMINOPHEN AND CAFFEINE 50; 325; 40 MG/1; MG/1; MG/1
TABLET ORAL
Qty: 30 TABLET | Refills: 3 | Status: SHIPPED | OUTPATIENT
Start: 2023-02-14 | End: 2023-04-18

## 2023-02-16 ENCOUNTER — OFFICE VISIT (OUTPATIENT)
Dept: FAMILY MEDICINE | Facility: CLINIC | Age: 58
End: 2023-02-16

## 2023-02-16 VITALS
DIASTOLIC BLOOD PRESSURE: 81 MMHG | TEMPERATURE: 97.5 F | WEIGHT: 143.8 LBS | BODY MASS INDEX: 26.46 KG/M2 | HEIGHT: 62 IN | SYSTOLIC BLOOD PRESSURE: 137 MMHG | HEART RATE: 59 BPM | RESPIRATION RATE: 16 BRPM | OXYGEN SATURATION: 97 %

## 2023-02-16 DIAGNOSIS — R11.2 NAUSEA AND VOMITING, UNSPECIFIED VOMITING TYPE: ICD-10-CM

## 2023-02-16 DIAGNOSIS — Z94.0 KIDNEY REPLACED BY TRANSPLANT: ICD-10-CM

## 2023-02-16 DIAGNOSIS — J34.89 RHINORRHEA: ICD-10-CM

## 2023-02-16 DIAGNOSIS — R42 VERTIGO: Primary | ICD-10-CM

## 2023-02-16 DIAGNOSIS — Z94.4 LIVER REPLACED BY TRANSPLANT (H): ICD-10-CM

## 2023-02-16 LAB
% GRANULOCYTES: 73.8 % (ref 42.2–75.2)
BACTERIA (RMG): ABNORMAL
BILIRUBIN (RMG): ABNORMAL
BLOOD (RMG): NEGATIVE
CASTS (RMG): ABNORMAL
COLOR UR: YELLOW
CRYSTAL (RMG): ABNORMAL
EPITHELIAL (RMG): ABNORMAL
GLUCOSE (RMG): NEGATIVE
HCT VFR BLD AUTO: 45.2 % (ref 35–46)
HEMOGLOBIN: 15.2 G/DL (ref 11.8–15.5)
INFLUENZA A (RMG): NEGATIVE
INFLUENZA B (RMG): NEGATIVE
KETONE (RMG): ABNORMAL
LEUKOCYTE (RMG): NEGATIVE
LYMPHOCYTES NFR BLD AUTO: 20.7 % (ref 20.5–51.1)
MCH RBC QN AUTO: 29.6 PG (ref 27–31)
MCHC RBC AUTO-ENTMCNC: 33.6 G/DL (ref 33–37)
MCV RBC AUTO: 87.9 FL (ref 80–100)
MONOCYTES NFR BLD AUTO: 5.5 % (ref 1.7–9.3)
MUCOUS (RMG): ABNORMAL
NITRITE (RMG): NEGATIVE
PH UR STRIP: 5.5 PH (ref 5–9)
PLATELET # BLD AUTO: 234 K/UL (ref 140–450)
PROTEIN (RMG): NEGATIVE
RBC # BLD AUTO: 5.15 X10/CMM (ref 3.7–5.2)
RBC (RMG): ABNORMAL
SARS ANTIGEN (RMG): NEGATIVE
SP GR UR STRIP: 1.02 (ref 1–1.02)
UROBILINOGEN (RMG): 0.2
WBC # BLD AUTO: 9 X10/CMM (ref 3.8–11)
WBC (RMG): ABNORMAL

## 2023-02-16 PROCEDURE — 87428 SARSCOV & INF VIR A&B AG IA: CPT | Mod: QW | Performed by: NURSE PRACTITIONER

## 2023-02-16 PROCEDURE — 81003 URINALYSIS AUTO W/O SCOPE: CPT | Performed by: NURSE PRACTITIONER

## 2023-02-16 PROCEDURE — 36415 COLL VENOUS BLD VENIPUNCTURE: CPT | Performed by: NURSE PRACTITIONER

## 2023-02-16 PROCEDURE — 99214 OFFICE O/P EST MOD 30 MIN: CPT | Performed by: NURSE PRACTITIONER

## 2023-02-16 PROCEDURE — 85651 RBC SED RATE NONAUTOMATED: CPT | Performed by: NURSE PRACTITIONER

## 2023-02-16 PROCEDURE — 85025 COMPLETE CBC W/AUTO DIFF WBC: CPT | Performed by: NURSE PRACTITIONER

## 2023-02-16 RX ORDER — ONDANSETRON 4 MG/1
TABLET, ORALLY DISINTEGRATING ORAL
Qty: 20 TABLET | Refills: 1 | Status: SHIPPED | OUTPATIENT
Start: 2023-02-16

## 2023-02-16 NOTE — PROGRESS NOTES
"Problem(s) Oriented visit        SUBJECTIVE:                                                    Nani Ford is a 57 year old female who presents to clinic today for the following health issues :    Monday didn't feel \"quite right\". Food didn't taste right. Woke up at 2 am Tuesday morning and was so dizzy that she fell. Did not hit head. Dizziness worse with position changes and turning head. Nausea is constant. Unable to keep food or medications down. History of both kidney and liver transplant. Unsure if medications have been staying down so sometimes taking another dose.     Problem list, Medication list, Allergies, and Medical/Social/Surgical histories reviewed in [facility] and updated as appropriate.   Additional history: as documented    ROS:  10 point ROS completed and negative except noted above, including Gen, HEENT, CV, Resp, GI, , MS, Neurologic, Psych    OBJECTIVE:                                                    /81   Pulse 59   Temp 97.5  F (36.4  C) (Temporal)   Resp 16   Ht 1.575 m (5' 2\")   Wt 65.2 kg (143 lb 12.8 oz)   SpO2 97%   BMI 26.30 kg/m    Body mass index is 26.3 kg/m .   GENERAL: Adult female appears in mild distress  EYES: Eyes grossly normal to inspection and PERRL  HENT: ear canals and TM's normal, nose and mouth without ulcers or lesions, oropharynx clear and oral mucous membranes moist  RESP: lungs clear to auscultation - no rales, rhonchi or wheezes  CV: regular rates and rhythm, normal S1 S2, no S3 or S4 and no murmur, click or rub  ABDOMEN: guarded, generalized tenderness  MS: extremities normal- no gross deformities noted  PSYCH: anxious mood     ASSESSMENT/PLAN:                                                      Nani was seen today for dizziness.    Diagnoses and all orders for this visit:    Vertigo  -     CBC with Diff/Plt ([facility])  -     VENOUS COLLECTION  Nausea and vomiting, unspecified vomiting type  -     Urinalysis ([facility])  -     Sed Rate Westergren " ([facility])  -     Comp. Metabolic Panel (14) ([facility])  -     Lipase  Serum ([facility])  -     VENOUS COLLECTION  -     ondansetron (ZOFRAN ODT) 4 MG ODT tab; Take 1-2 tabs (4-8 mg) under tongue every 6 hours as needed for nausea  Concern for dehydration and medications not being taken as ordered due to nausea and vomiting. Recommend hydrating, prescription nausea medication. Needs ED if any worsening symptoms or if still ongoing tomorrow.     Rhinorrhea  -     Influenza + SARS Antigen ([facility])  -     VENOUS COLLECTION  Negative covid and influenza testing    Liver replaced by transplant (H)  Kidney replaced by transplant  Recommend notifying transplant team if unable to keep medications down      See Patient Instructions  Patient Instructions   For nausea and/or vomiting, you can take Zofran or Odonsetron 4 mg sublinqual tablets, 1-2 tablets dissolved on tongue up to every 6 hours as needed.       Pedialyte (sometimes in baby aisle) to help replenish fluid/electrolyte levels.           HOLDEN Garcia CNP  [facility]  Family Practice  [facility]  [phone]    For any issues my office # is [phone]

## 2023-02-16 NOTE — PATIENT INSTRUCTIONS
For nausea and/or vomiting, you can take Zofran or Odonsetron 4 mg sublinqual tablets, 1-2 tablets dissolved on tongue up to every 6 hours as needed.       Pedialyte (sometimes in baby aisle) to help replenish fluid/electrolyte levels.

## 2023-02-17 LAB
ALBUMIN SERPL-MCNC: 4.6 G/DL (ref 3.8–4.9)
ALBUMIN/GLOB SERPL: 1.5 {RATIO} (ref 1.2–2.2)
ALP SERPL-CCNC: 39 IU/L (ref 44–121)
ALT SERPL-CCNC: 9 IU/L (ref 0–32)
AST SERPL-CCNC: 13 IU/L (ref 0–40)
BILIRUB SERPL-MCNC: 0.4 MG/DL (ref 0–1.2)
BUN SERPL-MCNC: 19 MG/DL (ref 6–24)
BUN/CREATININE RATIO: 18 (ref 9–23)
CALCIUM SERPL-MCNC: 10.1 MG/DL (ref 8.7–10.2)
CHLORIDE SERPLBLD-SCNC: 101 MMOL/L (ref 96–106)
CREAT SERPL-MCNC: 1.08 MG/DL (ref 0.57–1)
EGFR: 61 ML/MIN/1.73
ERYTHROCYTE [SEDIMENTATION RATE] IN BLOOD: 28 MM/HR (ref 0–20)
GLOBULIN, TOTAL: 3 G/DL (ref 1.5–4.5)
GLUCOSE SERPL-MCNC: 128 MG/DL (ref 70–99)
LIPASE SERPL-CCNC: 26 U/L (ref 14–72)
POTASSIUM SERPL-SCNC: 4.3 MMOL/L (ref 3.5–5.2)
PROT SERPL-MCNC: 7.6 G/DL (ref 6–8.5)
SODIUM SERPL-SCNC: 141 MMOL/L (ref 134–144)
TOTAL CO2: 22 MMOL/L (ref 20–29)

## 2023-03-15 DIAGNOSIS — K21.9 GASTROESOPHAGEAL REFLUX DISEASE WITHOUT ESOPHAGITIS: ICD-10-CM

## 2023-03-15 RX ORDER — OMEPRAZOLE 40 MG/1
CAPSULE, DELAYED RELEASE ORAL
Qty: 180 CAPSULE | Refills: 0 | Status: SHIPPED | OUTPATIENT
Start: 2023-03-15 | End: 2023-07-20

## 2023-03-24 DIAGNOSIS — G47.01 INSOMNIA DUE TO MEDICAL CONDITION: ICD-10-CM

## 2023-03-24 NOTE — TELEPHONE ENCOUNTER
ZOLPIDEM  LOV (VERTIGO) 2/16/23  LOV (MEDS) 4/14/22    LAST FILLED 3/9/23 #15    DUE FOR AWV OR MED CHECK

## 2023-03-26 RX ORDER — ZOLPIDEM TARTRATE 5 MG/1
TABLET ORAL
Qty: 30 TABLET | Refills: 3 | Status: SHIPPED | OUTPATIENT
Start: 2023-03-26 | End: 2023-07-31

## 2023-04-18 DIAGNOSIS — R51.9 NONINTRACTABLE HEADACHE, UNSPECIFIED CHRONICITY PATTERN, UNSPECIFIED HEADACHE TYPE: ICD-10-CM

## 2023-04-18 RX ORDER — BUTALBITAL, ACETAMINOPHEN AND CAFFEINE 50; 325; 40 MG/1; MG/1; MG/1
TABLET ORAL
Qty: 30 TABLET | Refills: 3 | Status: SHIPPED | OUTPATIENT
Start: 2023-04-18 | End: 2023-06-07

## 2023-05-23 DIAGNOSIS — I10 ESSENTIAL HYPERTENSION: ICD-10-CM

## 2023-05-23 RX ORDER — METOPROLOL SUCCINATE 50 MG/1
TABLET, EXTENDED RELEASE ORAL
Qty: 90 TABLET | Refills: 3 | Status: SHIPPED | OUTPATIENT
Start: 2023-05-23 | End: 2024-06-04

## 2023-05-23 RX ORDER — AMLODIPINE BESYLATE 5 MG/1
TABLET ORAL
Qty: 90 TABLET | Refills: 3 | Status: SHIPPED | OUTPATIENT
Start: 2023-05-23 | End: 2024-06-04

## 2023-06-07 ENCOUNTER — OFFICE VISIT (OUTPATIENT)
Dept: FAMILY MEDICINE | Facility: CLINIC | Age: 58
End: 2023-06-07

## 2023-06-07 VITALS
WEIGHT: 147 LBS | HEART RATE: 54 BPM | BODY MASS INDEX: 27.05 KG/M2 | SYSTOLIC BLOOD PRESSURE: 156 MMHG | DIASTOLIC BLOOD PRESSURE: 76 MMHG | OXYGEN SATURATION: 100 % | HEIGHT: 62 IN

## 2023-06-07 DIAGNOSIS — Z94.4 LIVER REPLACED BY TRANSPLANT (H): Primary | ICD-10-CM

## 2023-06-07 DIAGNOSIS — R51.9 NONINTRACTABLE HEADACHE, UNSPECIFIED CHRONICITY PATTERN, UNSPECIFIED HEADACHE TYPE: ICD-10-CM

## 2023-06-07 DIAGNOSIS — Z94.0 KIDNEY REPLACED BY TRANSPLANT: ICD-10-CM

## 2023-06-07 DIAGNOSIS — F12.20 CANNABIS DEPENDENCE (H): ICD-10-CM

## 2023-06-07 DIAGNOSIS — M79.7 FIBROMYALGIA: ICD-10-CM

## 2023-06-07 DIAGNOSIS — F33.1 MODERATE EPISODE OF RECURRENT MAJOR DEPRESSIVE DISORDER (H): ICD-10-CM

## 2023-06-07 PROCEDURE — 99214 OFFICE O/P EST MOD 30 MIN: CPT | Performed by: FAMILY MEDICINE

## 2023-06-07 RX ORDER — CLOPIDOGREL BISULFATE 75 MG/1
1 TABLET ORAL
COMMUNITY
End: 2023-06-07

## 2023-06-07 RX ORDER — BUTALBITAL, ACETAMINOPHEN AND CAFFEINE 50; 325; 40 MG/1; MG/1; MG/1
1 TABLET ORAL EVERY 4 HOURS PRN
Qty: 30 TABLET | Refills: 3 | Status: SHIPPED | OUTPATIENT
Start: 2023-06-07 | End: 2023-07-31

## 2023-06-07 ASSESSMENT — ANXIETY QUESTIONNAIRES
6. BECOMING EASILY ANNOYED OR IRRITABLE: MORE THAN HALF THE DAYS
1. FEELING NERVOUS, ANXIOUS, OR ON EDGE: MORE THAN HALF THE DAYS
IF YOU CHECKED OFF ANY PROBLEMS ON THIS QUESTIONNAIRE, HOW DIFFICULT HAVE THESE PROBLEMS MADE IT FOR YOU TO DO YOUR WORK, TAKE CARE OF THINGS AT HOME, OR GET ALONG WITH OTHER PEOPLE: VERY DIFFICULT
5. BEING SO RESTLESS THAT IT IS HARD TO SIT STILL: MORE THAN HALF THE DAYS
2. NOT BEING ABLE TO STOP OR CONTROL WORRYING: MORE THAN HALF THE DAYS
GAD7 TOTAL SCORE: 14
GAD7 TOTAL SCORE: 14
7. FEELING AFRAID AS IF SOMETHING AWFUL MIGHT HAPPEN: MORE THAN HALF THE DAYS
3. WORRYING TOO MUCH ABOUT DIFFERENT THINGS: MORE THAN HALF THE DAYS

## 2023-06-07 ASSESSMENT — PATIENT HEALTH QUESTIONNAIRE - PHQ9
5. POOR APPETITE OR OVEREATING: MORE THAN HALF THE DAYS
SUM OF ALL RESPONSES TO PHQ QUESTIONS 1-9: 14

## 2023-06-07 NOTE — PROGRESS NOTES
"Depression:  Has known history of depression.  Has been on medication for this.  The patient does not report any significant side effects of this medication.  The prior symptoms leading to the original diagnosis and decision to start medication therapy are not yet optimal.     Appetite is stable.  Sleeping patterns are stable.  No reported thoughts of suicide or homicide.  Last PHQ-9 score on record=     6/5/2019     8:47 AM 7/16/2019     3:47 PM 12/18/2019     4:00 PM 7/22/2021     1:39 PM 4/14/2022    12:29 PM 6/7/2023     9:23 AM   PHQ-9 SCORE   PHQ-9 Total Score 9 17 25 17 12 14     ANXIETY:  Has known history of anxiety and has been on medication for this.  The patient does not report any significant side effects of this medication.  The prior symptoms leading to the original diagnosis and decision to start medication therapy are worse.     Appetite is stable.  Sleeping patterns are stable.    Overall, the level of \"racing thoughts\", emotional lability, and ease of agitation are worse.            7/22/2021     1:39 PM 4/14/2022    12:29 PM 6/7/2023     9:23 AM   MIMI-7 SCORE   Total Score 18 14 14     Problem(s) Oriented visit      ROS:  General and Resp. completed and negative except as noted above     HISTORY:   reports no history of alcohol use.   reports that she quit smoking about 10 years ago. Her smoking use included cigarettes. She has a 9.00 pack-year smoking history. She has never used smokeless tobacco.    Past Medical History:   Diagnosis Date     Anemia 8/22/2013     Anxiety      Cannabis dependence (H)      Chronic kidney disease, stage IV (severe) (H)      Complication of transplanted kidney 11/29/2012     Fibromyalgia 10/7/2016     Former smoker      History of gout 2/12/2013     History of kidney transplant 12/17/2012     HTN (hypertension)      Hyperlipidemia with target LDL less than 100      Insomnia, unspecified type 7/17/2018     Liver cell carcinoma (H) 9/29/2010     Liver transplant 1990 " and 2012     Obesity (BMI 35.0-39.9) with comorbidity (H) 2/13/2019     Raynaud phenomenon 8/22/2013     S/P coronary angiogram 10/10/2019     Scleroderma, diffuse (H) 8/22/2013     STEMI (ST elevation myocardial infarction) (H) 10/10/2019     Tobacco use      Past Surgical History:   Procedure Laterality Date     BIOPSY       CV CORONARY ANGIOGRAM N/A 10/10/2019    Procedure: Coronary Angiogram;  Surgeon: Rajendra De La Torre MD;  Location:  [facility]     CV LEFT HEART CATH N/A 10/10/2019    Procedure: Left Heart Cath;  Surgeon: Rajendra De La Torre MD;  Location:  [facility]     CV PCI STENT DRUG ELUTING N/A 10/10/2019    Procedure: PCI Stent Drug Eluting;  Surgeon: Rajendra De La Torre MD;  Location:  [facility]     GI SURGERY       HEART CATH LEFT HEART CATH  10/10/2019    Successful PCI of the D2 with a [id number] Synergy drug eluting stent.     RETURN LIVER TRANSPLANT      21 years ago     TRANSPLANT         EXAM:  BP: 156/76   Pulse: 54    Temp: Data Unavailable    Wt Readings from Last 2 Encounters:   06/07/23 66.7 kg (147 lb)   02/16/23 65.2 kg (143 lb 12.8 oz)       BMI= Body mass index is 26.89 kg/m .    EXAM:  APPEARANCE: = alert, moderate distress, cooperative and fatigued  Abdomen = Soft, nontender, no masses, & bowel sounds in all quadrants      Assessment/Plan:  Nani was seen today for recheck medication.    Diagnoses and all orders for this visit:    Liver replaced by transplant (H)  and  Kidney replaced by transplant    Cannabis dependence (H)    Fibromyalgia    Nonintractable headache, unspecified chronicity pattern, unspecified headache type  Stable with rare use of   -     butalbital-acetaminophen-caffeine (ESGIC) -40 MG tablet; Take 1 tablet by mouth every 4 hours as needed for headaches    Moderate episode of recurrent major depressive disorder (H)Spoke at length about mood disorders including suspected pathophysiology, role of  "neurotransmitters, and treatment options including medication options ( especially SSRIs that treat cause of sx) and counselling.  Tolerating current meds. We discussed ongoing management of her  medications and how we will refill the medications now and in the future.  -     FLUoxetine (PROZAC) 20 MG capsule; Take 1 capsule (20 mg) by mouth daily        COUNSELING:   reports that she quit smoking about 10 years ago. Her smoking use included cigarettes. She has a 9.00 pack-year smoking history. She has never used smokeless tobacco.    Estimated body mass index is 26.89 kg/m  as calculated from the following:    Height as of this encounter: 1.575 m (5' 2\").    Weight as of this encounter: 66.7 kg (147 lb).       Appropriate preventive services were discussed with this patient, including applicable screening as appropriate for cardiovascular disease, diabetes, osteopenia/osteoporosis, and glaucoma.  As appropriate for age/gender, discussed screening for colorectal cancer, prostate cancer, breast cancer, and cervical cancer. Checklist reviewing preventive services available has been given to the patient.    Reviewed patients plan of care and provided an AVS. The Basic Care Plan (routine screening as documented in Health Maintenance) for Nani meets the Care Plan requirement. This Care Plan has been established and reviewed with the  Patient.      The following health maintenance items are reviewed in Epic and correct as of today:  Health Maintenance   Topic Date Due     ADVANCE CARE PLANNING  Never done     COLORECTAL CANCER SCREENING  Never done     MEDICARE ANNUAL WELLNESS VISIT  Never done     HEPATITIS C SCREENING  Never done     LUNG CANCER SCREENING  Never done     MAMMO SCREENING  11/15/2019     PHQ-9  12/07/2023     LIPID  04/14/2027     DTAP/TDAP/TD IMMUNIZATION (4 - Td or Tdap) 04/14/2032     Pneumococcal Vaccine: Pediatrics (0 to 5 Years) and At-Risk Patients (6 to 64 Years) (4 - PPSV23 if available, else " PCV20) 09/25/2032     HIV SCREENING  Completed     DEPRESSION ACTION PLAN  Completed     INFLUENZA VACCINE  Completed     ZOSTER IMMUNIZATION  Completed     HEPATITIS B IMMUNIZATION  Completed     COVID-19 Vaccine  Completed     IPV IMMUNIZATION  Aged Out     MENINGITIS IMMUNIZATION  Aged Out     PAP  Discontinued       Tavo Mann  [facility]  For any issues my office # is [phone]          '

## 2023-07-20 DIAGNOSIS — I25.10 CORONARY ARTERY DISEASE INVOLVING NATIVE CORONARY ARTERY OF NATIVE HEART WITHOUT ANGINA PECTORIS: ICD-10-CM

## 2023-07-20 DIAGNOSIS — K21.9 GASTROESOPHAGEAL REFLUX DISEASE WITHOUT ESOPHAGITIS: ICD-10-CM

## 2023-07-20 RX ORDER — ATORVASTATIN CALCIUM 40 MG/1
TABLET, FILM COATED ORAL
Qty: 30 TABLET | Refills: 6 | Status: SHIPPED | OUTPATIENT
Start: 2023-07-20 | End: 2024-03-04

## 2023-07-20 RX ORDER — OMEPRAZOLE 40 MG/1
CAPSULE, DELAYED RELEASE ORAL
Qty: 180 CAPSULE | Refills: 0 | Status: SHIPPED | OUTPATIENT
Start: 2023-07-20 | End: 2023-10-23

## 2023-07-30 DIAGNOSIS — G47.01 INSOMNIA DUE TO MEDICAL CONDITION: ICD-10-CM

## 2023-07-30 DIAGNOSIS — R51.9 NONINTRACTABLE HEADACHE, UNSPECIFIED CHRONICITY PATTERN, UNSPECIFIED HEADACHE TYPE: ICD-10-CM

## 2023-07-31 RX ORDER — BUTALBITAL, ACETAMINOPHEN AND CAFFEINE 50; 325; 40 MG/1; MG/1; MG/1
TABLET ORAL
Qty: 30 TABLET | Refills: 3 | Status: SHIPPED | OUTPATIENT
Start: 2023-07-31 | End: 2023-09-06

## 2023-07-31 RX ORDER — ZOLPIDEM TARTRATE 5 MG/1
TABLET ORAL
Qty: 30 TABLET | Refills: 3 | Status: SHIPPED | OUTPATIENT
Start: 2023-07-31 | End: 2023-11-19

## 2023-09-06 DIAGNOSIS — R51.9 NONINTRACTABLE HEADACHE, UNSPECIFIED CHRONICITY PATTERN, UNSPECIFIED HEADACHE TYPE: ICD-10-CM

## 2023-09-06 RX ORDER — BUTALBITAL, ACETAMINOPHEN AND CAFFEINE 50; 325; 40 MG/1; MG/1; MG/1
TABLET ORAL
Qty: 30 TABLET | Refills: 5 | Status: SHIPPED | OUTPATIENT
Start: 2023-09-06 | End: 2023-11-07

## 2023-10-23 DIAGNOSIS — K21.9 GASTROESOPHAGEAL REFLUX DISEASE WITHOUT ESOPHAGITIS: ICD-10-CM

## 2023-10-23 RX ORDER — OMEPRAZOLE 40 MG/1
CAPSULE, DELAYED RELEASE ORAL
Qty: 180 CAPSULE | Refills: 0 | Status: SHIPPED | OUTPATIENT
Start: 2023-10-23 | End: 2024-01-17

## 2023-11-07 DIAGNOSIS — R51.9 NONINTRACTABLE HEADACHE, UNSPECIFIED CHRONICITY PATTERN, UNSPECIFIED HEADACHE TYPE: ICD-10-CM

## 2023-11-07 RX ORDER — BUTALBITAL, ACETAMINOPHEN AND CAFFEINE 50; 325; 40 MG/1; MG/1; MG/1
TABLET ORAL
Qty: 30 TABLET | Refills: 5 | Status: SHIPPED | OUTPATIENT
Start: 2023-11-07 | End: 2024-01-17

## 2023-11-07 NOTE — CONFIDENTIAL NOTE
CONTROLLED SUBSTANCE REFILL REQUEST FOR BUTALBITAL-ACETAMINOPHEN-CAFFEINE  DOSE: -40 TB  - # 30. 5 REFILLS  SI TAB EVERY 4 HOURS PRN FOR HEADACHE      LAST REFILL: 10/11/23    LAST APPT RELATED: 23    NEXT APPT: NONE      CONTROLLED SUBSTANCE AGREEMENT: DUE    URINE DRUG SCREEN: DUE        FILL HISTORY PER :          REFILL ROUTED TO DR GUTIERREZ FOR REVIEW    Sherron Kirk, CMA

## 2023-11-19 DIAGNOSIS — G47.01 INSOMNIA DUE TO MEDICAL CONDITION: ICD-10-CM

## 2023-11-19 RX ORDER — ZOLPIDEM TARTRATE 5 MG/1
TABLET ORAL
Qty: 30 TABLET | Refills: 3 | Status: SHIPPED | OUTPATIENT
Start: 2023-11-19 | End: 2024-04-17

## 2023-11-21 DIAGNOSIS — F33.1 MODERATE EPISODE OF RECURRENT MAJOR DEPRESSIVE DISORDER (H): ICD-10-CM

## 2023-11-21 NOTE — TELEPHONE ENCOUNTER
Med: Fluoxetine        LOV (related): 6/7/23      Due for F/U around: None       Next Appt: None               7/22/2021     1:39 PM 4/14/2022    12:29 PM 6/7/2023     9:23 AM   PHQ   PHQ-9 Total Score 17 12 14   Q9: Thoughts of better off dead/self-harm past 2 weeks Not at all Not at all Several days           7/22/2021     1:39 PM 4/14/2022    12:29 PM 6/7/2023     9:23 AM   MIMI-7 SCORE   Total Score 18 14 14

## 2024-01-17 ENCOUNTER — MYC REFILL (OUTPATIENT)
Dept: FAMILY MEDICINE | Facility: CLINIC | Age: 59
End: 2024-01-17

## 2024-01-17 ENCOUNTER — MYC MEDICAL ADVICE (OUTPATIENT)
Dept: FAMILY MEDICINE | Facility: CLINIC | Age: 59
End: 2024-01-17

## 2024-01-17 DIAGNOSIS — K21.9 GASTROESOPHAGEAL REFLUX DISEASE WITHOUT ESOPHAGITIS: ICD-10-CM

## 2024-01-17 DIAGNOSIS — I10 ESSENTIAL HYPERTENSION: ICD-10-CM

## 2024-01-17 DIAGNOSIS — G47.01 INSOMNIA DUE TO MEDICAL CONDITION: ICD-10-CM

## 2024-01-17 DIAGNOSIS — I25.10 CORONARY ARTERY DISEASE INVOLVING NATIVE CORONARY ARTERY OF NATIVE HEART WITHOUT ANGINA PECTORIS: ICD-10-CM

## 2024-01-17 DIAGNOSIS — F33.1 MODERATE EPISODE OF RECURRENT MAJOR DEPRESSIVE DISORDER (H): ICD-10-CM

## 2024-01-17 DIAGNOSIS — R51.9 NONINTRACTABLE HEADACHE, UNSPECIFIED CHRONICITY PATTERN, UNSPECIFIED HEADACHE TYPE: ICD-10-CM

## 2024-01-17 RX ORDER — METOPROLOL SUCCINATE 50 MG/1
50 TABLET, EXTENDED RELEASE ORAL DAILY
Qty: 90 TABLET | Refills: 3 | Status: CANCELLED | OUTPATIENT
Start: 2024-01-17

## 2024-01-17 RX ORDER — ATORVASTATIN CALCIUM 40 MG/1
40 TABLET, FILM COATED ORAL DAILY
Qty: 30 TABLET | Refills: 6 | Status: CANCELLED | OUTPATIENT
Start: 2024-01-17

## 2024-01-17 RX ORDER — PREDNISONE 5 MG/1
5 TABLET ORAL EVERY MORNING
Status: CANCELLED | OUTPATIENT
Start: 2024-01-17

## 2024-01-17 RX ORDER — ZOLPIDEM TARTRATE 5 MG/1
5 TABLET ORAL
Qty: 30 TABLET | Refills: 3 | Status: CANCELLED | OUTPATIENT
Start: 2024-01-17

## 2024-01-17 RX ORDER — BUTALBITAL, ACETAMINOPHEN AND CAFFEINE 50; 325; 40 MG/1; MG/1; MG/1
1 TABLET ORAL EVERY 4 HOURS PRN
Qty: 15 TABLET | Refills: 0 | Status: SHIPPED | OUTPATIENT
Start: 2024-01-17 | End: 2024-03-05

## 2024-01-17 RX ORDER — AMLODIPINE BESYLATE 5 MG/1
5 TABLET ORAL DAILY
Qty: 90 TABLET | Refills: 3 | Status: CANCELLED | OUTPATIENT
Start: 2024-01-17

## 2024-01-17 RX ORDER — OMEPRAZOLE 40 MG/1
40 CAPSULE, DELAYED RELEASE ORAL 2 TIMES DAILY
Qty: 180 CAPSULE | Refills: 0 | Status: SHIPPED | OUTPATIENT
Start: 2024-01-17 | End: 2024-03-05

## 2024-01-17 NOTE — TELEPHONE ENCOUNTER
Spoke with patient regarding refill request for multiple medications and request to change pharmacy from [facility] to [facility].  Called [facility] to inquire on available refills, and informed patient to have new pharmacy contact [facility] to have them transferred.  Patient is also due for physical/med check and is scheduled with Tavo Mann MD on 1/31/24.   Moni Dsouza LPN on 1/17/2024 at 9:02 AM

## 2024-01-18 ENCOUNTER — TELEPHONE (OUTPATIENT)
Dept: FAMILY MEDICINE | Facility: CLINIC | Age: 59
End: 2024-01-18

## 2024-01-18 NOTE — TELEPHONE ENCOUNTER
Controlled Substance Refill Request for: butalbital-APAP-caffeine -40  Last refill: 12/23/23 #30  Last clinic visit: 6/7/23   Clinic visit frequency required: Q 6  months  Next appt: 1/31/24  Controlled substance agreement on file: No.-due for CSA  Tox screen done no    [place]  checked, fill history below. Refill routed to Dr Mann for review.      
Medication(s) approved  KN  
You can access the FollowMyHealth Patient Portal offered by [facility] by registering at the following website: [email or web address]. By joining [employer]’s FollowMyHealth portal, you will also be able to view your health information using other applications (apps) compatible with our system.

## 2024-01-18 NOTE — TELEPHONE ENCOUNTER
----- Message from Tavo Mann MD sent at 1/17/2024 11:41 PM CST -----  Can we call and get her in to see me next week.  Looks like her headaches have gotten much worse and she is taking lots of medication.  I refilled 15 to get her through.,  KN

## 2024-03-04 DIAGNOSIS — I25.10 CORONARY ARTERY DISEASE INVOLVING NATIVE CORONARY ARTERY OF NATIVE HEART WITHOUT ANGINA PECTORIS: ICD-10-CM

## 2024-03-04 RX ORDER — ATORVASTATIN CALCIUM 40 MG/1
40 TABLET, FILM COATED ORAL DAILY
Qty: 30 TABLET | Refills: 0 | Status: SHIPPED | OUTPATIENT
Start: 2024-03-04 | End: 2024-03-05

## 2024-03-04 NOTE — CONFIDENTIAL NOTE
"Med: ATORVASTATIN    LOV (related): 4/14/22    Last Lab: 4/14/22      Due for F/U around: OVERDUE FOR CPX! ALSO KN WANTED TO SEE PATIENT REGARDING MIGRAINES AND MEDICATIONS    Next Appt: NONE        Cholesterol   Date Value Ref Range Status   04/14/2022 266 (A) 100 - 199 mg/dl Final   10/18/2019 142 <200 mg/dL Final   10/10/2019 204 (H) <200 mg/dL Final     Comment:     Desirable:       <200 mg/dl     HDL Cholesterol   Date Value Ref Range Status   10/18/2019 45 (L) >49 mg/dL Final   10/10/2019 47 (L) >49 mg/dL Final     HDL   Date Value Ref Range Status   04/14/2022 52 40 mg/dl Final     LDL Cholesterol Calculated   Date Value Ref Range Status   10/18/2019 70 <100 mg/dL Final     Comment:     Desirable:       <100 mg/dl   10/10/2019 144 (H) <100 mg/dL Final     Comment:     Above desirable:  100-129 mg/dl  Borderline High:  130-159 mg/dL  High:             160-189 mg/dL  Very high:       >189 mg/dl       LDL CALCULATED ([facility])   Date Value Ref Range Status   04/14/2022 189 (A) 0 - 130 mg/dl Final     Triglycerides   Date Value Ref Range Status   04/14/2022 120 0 - 149 mg/dl Final   10/18/2019 133 <150 mg/dL Final     Comment:     Fasting specimen   10/10/2019 66 <150 mg/dL Final     No results found for: \"CHOLHDLRATIO\"     "

## 2024-03-05 ENCOUNTER — OFFICE VISIT (OUTPATIENT)
Dept: FAMILY MEDICINE | Facility: CLINIC | Age: 59
End: 2024-03-05

## 2024-03-05 VITALS
OXYGEN SATURATION: 97 % | BODY MASS INDEX: 24.73 KG/M2 | HEART RATE: 65 BPM | WEIGHT: 134.4 LBS | DIASTOLIC BLOOD PRESSURE: 52 MMHG | TEMPERATURE: 97.9 F | HEIGHT: 62 IN | SYSTOLIC BLOOD PRESSURE: 110 MMHG

## 2024-03-05 DIAGNOSIS — J01.00 ACUTE NON-RECURRENT MAXILLARY SINUSITIS: Primary | ICD-10-CM

## 2024-03-05 DIAGNOSIS — I25.10 CORONARY ARTERY DISEASE INVOLVING NATIVE CORONARY ARTERY OF NATIVE HEART WITHOUT ANGINA PECTORIS: ICD-10-CM

## 2024-03-05 DIAGNOSIS — R51.9 NONINTRACTABLE HEADACHE, UNSPECIFIED CHRONICITY PATTERN, UNSPECIFIED HEADACHE TYPE: ICD-10-CM

## 2024-03-05 DIAGNOSIS — K21.9 GASTROESOPHAGEAL REFLUX DISEASE WITHOUT ESOPHAGITIS: ICD-10-CM

## 2024-03-05 DIAGNOSIS — F33.1 MODERATE EPISODE OF RECURRENT MAJOR DEPRESSIVE DISORDER (H): ICD-10-CM

## 2024-03-05 DIAGNOSIS — R05.1 ACUTE COUGH: ICD-10-CM

## 2024-03-05 DIAGNOSIS — I10 PRIMARY HYPERTENSION: ICD-10-CM

## 2024-03-05 PROCEDURE — 99214 OFFICE O/P EST MOD 30 MIN: CPT

## 2024-03-05 RX ORDER — BUTALBITAL, ACETAMINOPHEN AND CAFFEINE 50; 325; 40 MG/1; MG/1; MG/1
1 TABLET ORAL EVERY 4 HOURS PRN
Qty: 15 TABLET | Refills: 0 | Status: SHIPPED | OUTPATIENT
Start: 2024-03-05 | End: 2024-04-03

## 2024-03-05 RX ORDER — BENZONATATE 100 MG/1
100 CAPSULE ORAL 3 TIMES DAILY PRN
Qty: 21 CAPSULE | Refills: 0 | Status: SHIPPED | OUTPATIENT
Start: 2024-03-05

## 2024-03-05 RX ORDER — OMEPRAZOLE 40 MG/1
40 CAPSULE, DELAYED RELEASE ORAL 2 TIMES DAILY
Qty: 180 CAPSULE | Refills: 1 | Status: SHIPPED | OUTPATIENT
Start: 2024-03-05 | End: 2024-09-27

## 2024-03-05 RX ORDER — ATORVASTATIN CALCIUM 40 MG/1
40 TABLET, FILM COATED ORAL DAILY
Qty: 90 TABLET | Refills: 1 | Status: SHIPPED | OUTPATIENT
Start: 2024-03-05 | End: 2024-08-11

## 2024-03-05 NOTE — PROGRESS NOTES
Assessment & Plan     Acute cough  We discussed the diagnosis, pathophysiology and natural history. Rx for Benzonatate for cough, side effects reviewed. Discussed red flags including if cough were to worsen, worsening shortness of breath or fevers to follow up immediately. Encourage good hydration.  Patient agreeable to plan.  - benzonatate (TESSALON) 100 MG capsule  Dispense: 21 capsule; Refill: 0    Acute non-recurrent maxillary sinusitis  S/s consistent with a sinus infection. Rx for Augmentin.   Discussed main side effects from antibiotics can be GI upset, loose stools, etc   Recommend taking with food to help reduce GI complications from antibiotic use.  Call if any serious diarrhea develops within the next several weeks of taking this antibiotics.   Recommended symptomatic cares such as steroid nasal spray for next few weeks, OTC analgesics, and/or nasal/sinus lavage with Netti pot or Sinus Rinse Kit. Decongestant nasal sprays can be used, but use with caution and not for more than 3 days. Follow up as needed for new or worsening symptoms or if symptoms fail to improve. Patient agreeable to plan. All questions answered.   - amoxicillin-clavulanate (AUGMENTIN) 875-125 MG tablet  Dispense: 14 tablet; Refill: 0    Moderate episode of recurrent major depressive disorder (H)  Taking Fluoxetine 20 mg daily. Stable/controlled. Continue current medication regimen.   - FLUoxetine (PROZAC) 20 MG capsule  Dispense: 90 capsule; Refill: 1    Gastroesophageal reflux disease without esophagitis  Taking Omeprazole 40 mg. Stable/controlled. Continue current medication regimen.   - omeprazole (PRILOSEC) 40 MG DR capsule  Dispense: 180 capsule; Refill: 1    Coronary artery disease involving native coronary artery of native heart without angina pectoris  Taking Atorvastatin 40 mg. Stable. Continue current medication regimen.   - atorvastatin (LIPITOR) 40 MG tablet  Dispense: 90 tablet; Refill: 1    Nonintractable headache,  unspecified chronicity pattern, unspecified headache type  Taking butalbital-acetaminophen-caffeine (ESGIC) -40 mg as needed. PDMP reviewed. Patient reports an increase in frequency of headaches. Recommend a follow up appointment to discuss headaches in more detail. Red flags that warrant emergent evaluation discussed. Patient agreeable to plan. All questions answered.   - butalbital-acetaminophen-caffeine (ESGIC) -40 MG tablet  Dispense: 15 tablet; Refill: 0    Primary hypertension  Reviewed current HTN management. Blood pressure is controlled with current medication(s). Goal BP <130/80. We today managed prescriptions with refills ensured to ensure availabilty of current medications. Reviewed lifestyle modifications. Required intervals for follow up on HTN, lab studies reviewed. Strongly recommened blood pressures are checked outside the clinic to ensure that BPs are remaining within guidelines. Instructed to contact me if the readings are not within guidelines on a regular basis so we can adjust treatment as needed. Reviewed side effects of medications, alarm signs and symptoms, and when to seek further care.          See Patient Instructions    Return if symptoms worsen or fail to improve, for Follow up.    Deandra Cardoza is a 58 year old, presenting for the following health issues:  Recheck Medication and URI (Cough, congestion, runny nose, ears lots of fluid, headache X's 2 weeks /Negative covid test 1 week ago,  had the same cold 3 weeks ago was seen but was told it is a viral illness /)    HPI       Concern - URI  Onset: 2 weeks. Covid test was negative  Description: Eats plugged, headache, cough, congestion, decrease appetite  Intensity: moderate  Progression of Symptoms:  same  Accompanying Signs & Symptoms: Eats plugged, headache, cough, congestion, decrease appetite, fatigue   Previous history of similar problem: none  Precipitating factors:        Worsened by: none  Alleviating  "factors:        Improved by: none, hx of transplant  Therapies tried and outcome:  none      has been sick as well and given prescription for cough medication    Needs refills of multiple medications:    HTN: Does not take BP at home. Taking Amlodipine 5 mg and metoprolol 50 mg daily    GERD: Omeprazole 40 mg daily, controlled     Depression: Fluoxetine 20 mg daily, going well    CAD: Atorvastatin 40 mg, no side effects    Headaches: Esgic. Having more frequent headaches. Maybe worse over the past 1 year.     Review of Systems  Constitutional, HEENT, cardiovascular, pulmonary, gi and gu systems are negative, except as otherwise noted.      Objective    /52   Pulse 65   Temp 97.9  F (36.6  C) (Oral)   Ht 1.562 m (5' 1.5\")   Wt 61 kg (134 lb 6.4 oz)   SpO2 97%   BMI 24.98 kg/m    Body mass index is 24.98 kg/m .  Physical Exam   GENERAL: alert and no distress  HENT: normal cephalic/atraumatic, both ears: clear effusion, nose and mouth without ulcers or lesions, oropharynx clear, oral mucous membranes moist, and sinuses: maxillary tenderness on both sides  RESP: lungs clear to auscultation - no rales, rhonchi or wheezes  CV: regular rate and rhythm, normal S1 S2, no S3 or S4, no murmur, click or rub, no peripheral edema  PSYCH: mentation appears normal, affect normal/bright            Signed Electronically by: HOLDEN Ferrer CNP    "

## 2024-04-02 DIAGNOSIS — R51.9 NONINTRACTABLE HEADACHE, UNSPECIFIED CHRONICITY PATTERN, UNSPECIFIED HEADACHE TYPE: ICD-10-CM

## 2024-04-03 RX ORDER — BUTALBITAL, ACETAMINOPHEN AND CAFFEINE 50; 325; 40 MG/1; MG/1; MG/1
1 TABLET ORAL EVERY 4 HOURS PRN
Qty: 15 TABLET | Refills: 5 | Status: SHIPPED | OUTPATIENT
Start: 2024-04-03 | End: 2024-05-21

## 2024-04-16 DIAGNOSIS — G47.01 INSOMNIA DUE TO MEDICAL CONDITION: ICD-10-CM

## 2024-04-16 NOTE — CONFIDENTIAL NOTE
CONTROLLED SUBSTANCE REFILL REQUEST FOR AMBIEN  DOSE: 5 MG - # 30. 3 REFILLS  SI TAB NIGHTLY PRN FOR SLEEP      LAST REFILL: 3/31/24    LAST APPT RELATED: 22    NEXT APPT: NONE      CONTROLLED SUBSTANCE AGREEMENT: DUE    URINE DRUG SCREEN: DUE        FILL HISTORY PER :          REFILL ROUTED TO DR GUTIERREZ FOR REVIEW    Sherron Kirk, ARABELLA    **NEEDS APPT BEFORE FURTHER REFILLS

## 2024-04-17 RX ORDER — ZOLPIDEM TARTRATE 5 MG/1
5 TABLET ORAL
Qty: 15 TABLET | Refills: 0 | Status: SHIPPED | OUTPATIENT
Start: 2024-04-17 | End: 2024-06-04

## 2024-05-21 ENCOUNTER — MYC MEDICAL ADVICE (OUTPATIENT)
Dept: FAMILY MEDICINE | Facility: CLINIC | Age: 59
End: 2024-05-21

## 2024-05-21 DIAGNOSIS — R51.9 NONINTRACTABLE HEADACHE, UNSPECIFIED CHRONICITY PATTERN, UNSPECIFIED HEADACHE TYPE: ICD-10-CM

## 2024-05-21 RX ORDER — BUTALBITAL, ACETAMINOPHEN AND CAFFEINE 50; 325; 40 MG/1; MG/1; MG/1
1 TABLET ORAL EVERY 4 HOURS PRN
Qty: 15 TABLET | Refills: 5 | Status: SHIPPED | OUTPATIENT
Start: 2024-05-21 | End: 2024-06-26

## 2024-05-21 NOTE — TELEPHONE ENCOUNTER
CONTROLLED SUBSTANCE REFILL REQUEST FOR Butalbital-APAP-Caffeine-  DOSE: -40 mg  - # 15  SIG: TAKE 1 TABLET BY MOUTH EVERY 4 HOURS AS NEEDED FOR HEADACHES       LAST REFILL: 4/22/24    LAST APPT RELATED: 3/5/24    NEXT APPT: 5/28/24      CONTROLLED SUBSTANCE AGREEMENT: 7/22/21    URINE DRUG SCREEN: none         FILL HISTORY PER :          REFILL ROUTED TO KN FOR REVIEW

## 2024-06-04 ENCOUNTER — ORDERS ONLY (AUTO-RELEASED) (OUTPATIENT)
Dept: FAMILY MEDICINE | Facility: CLINIC | Age: 59
End: 2024-06-04

## 2024-06-04 ENCOUNTER — OFFICE VISIT (OUTPATIENT)
Dept: FAMILY MEDICINE | Facility: CLINIC | Age: 59
End: 2024-06-04

## 2024-06-04 VITALS
HEART RATE: 55 BPM | BODY MASS INDEX: 25.62 KG/M2 | SYSTOLIC BLOOD PRESSURE: 137 MMHG | WEIGHT: 137.8 LBS | OXYGEN SATURATION: 97 % | DIASTOLIC BLOOD PRESSURE: 89 MMHG

## 2024-06-04 DIAGNOSIS — Z12.11 SCREENING FOR COLON CANCER: ICD-10-CM

## 2024-06-04 DIAGNOSIS — F33.1 MODERATE EPISODE OF RECURRENT MAJOR DEPRESSIVE DISORDER (H): ICD-10-CM

## 2024-06-04 DIAGNOSIS — I73.00 RAYNAUD'S PHENOMENON WITHOUT GANGRENE: Primary | ICD-10-CM

## 2024-06-04 DIAGNOSIS — I25.10 CORONARY ARTERY DISEASE INVOLVING NATIVE CORONARY ARTERY OF NATIVE HEART WITHOUT ANGINA PECTORIS: ICD-10-CM

## 2024-06-04 DIAGNOSIS — M34.9 SCLERODERMA, DIFFUSE (H): ICD-10-CM

## 2024-06-04 DIAGNOSIS — Z94.0 KIDNEY REPLACED BY TRANSPLANT: ICD-10-CM

## 2024-06-04 DIAGNOSIS — F12.20 CANNABIS DEPENDENCE (H): ICD-10-CM

## 2024-06-04 DIAGNOSIS — Z23 NEED FOR SHINGLES VACCINE: ICD-10-CM

## 2024-06-04 DIAGNOSIS — Z94.4 LIVER REPLACED BY TRANSPLANT (H): ICD-10-CM

## 2024-06-04 DIAGNOSIS — G47.01 INSOMNIA DUE TO MEDICAL CONDITION: ICD-10-CM

## 2024-06-04 DIAGNOSIS — I10 PRIMARY HYPERTENSION: ICD-10-CM

## 2024-06-04 DIAGNOSIS — I10 ESSENTIAL HYPERTENSION: ICD-10-CM

## 2024-06-04 DIAGNOSIS — Z12.31 ENCOUNTER FOR SCREENING MAMMOGRAM FOR MALIGNANT NEOPLASM OF BREAST: ICD-10-CM

## 2024-06-04 LAB
% GRANULOCYTES: 73.1 % (ref 42.2–75.2)
ALBUMIN SERPL BCG-MCNC: 4.1 G/DL (ref 3.5–5.2)
ALP SERPL-CCNC: 72 U/L (ref 40–150)
ALT SERPL W P-5'-P-CCNC: 11 U/L (ref 0–50)
ANION GAP SERPL CALCULATED.3IONS-SCNC: 14 MMOL/L (ref 7–15)
AST SERPL W P-5'-P-CCNC: 17 U/L (ref 0–45)
BILIRUB SERPL-MCNC: 0.2 MG/DL
BUN SERPL-MCNC: 28.9 MG/DL (ref 6–20)
CALCIUM SERPL-MCNC: 10 MG/DL (ref 8.6–10)
CHLORIDE SERPL-SCNC: 104 MMOL/L (ref 98–107)
CHOLESTEROL: 165 MG/DL (ref 100–199)
CREAT SERPL-MCNC: 1.25 MG/DL (ref 0.51–0.95)
DEPRECATED HCO3 PLAS-SCNC: 23 MMOL/L (ref 22–29)
EGFRCR SERPLBLD CKD-EPI 2021: 50 ML/MIN/1.73M2
FASTING STATUS PATIENT QL REPORTED: YES
FASTING?: YES
GLUCOSE SERPL-MCNC: 92 MG/DL (ref 70–99)
HCT VFR BLD AUTO: 40.5 % (ref 35–46)
HDL (RMG): 44 MG/DL (ref 40–?)
HEMOGLOBIN: 13.7 G/DL (ref 11.8–15.5)
LDL CALCULATED (RMG): 93 MG/DL (ref 0–130)
LYMPHOCYTES NFR BLD AUTO: 21 % (ref 20.5–51.1)
MCH RBC QN AUTO: 29 PG (ref 27–31)
MCHC RBC AUTO-ENTMCNC: 33.8 G/DL (ref 33–37)
MCV RBC AUTO: 85.9 FL (ref 80–100)
MONOCYTES NFR BLD AUTO: 5.9 % (ref 1.7–9.3)
PLATELET # BLD AUTO: 312 K/UL (ref 140–450)
POTASSIUM SERPL-SCNC: 4.8 MMOL/L (ref 3.4–5.3)
PROT SERPL-MCNC: 7.7 G/DL (ref 6.4–8.3)
RBC # BLD AUTO: 4.71 X10/CMM (ref 3.7–5.2)
SODIUM SERPL-SCNC: 141 MMOL/L (ref 135–145)
TRIGLYCERIDES (RMG): 138 MG/DL (ref 0–149)
WBC # BLD AUTO: 9 X10/CMM (ref 3.8–11)

## 2024-06-04 PROCEDURE — 36415 COLL VENOUS BLD VENIPUNCTURE: CPT | Performed by: FAMILY MEDICINE

## 2024-06-04 PROCEDURE — 80061 LIPID PANEL: CPT | Mod: QW | Performed by: FAMILY MEDICINE

## 2024-06-04 PROCEDURE — 99214 OFFICE O/P EST MOD 30 MIN: CPT | Performed by: FAMILY MEDICINE

## 2024-06-04 PROCEDURE — G2211 COMPLEX E/M VISIT ADD ON: HCPCS | Performed by: FAMILY MEDICINE

## 2024-06-04 PROCEDURE — 80053 COMPREHEN METABOLIC PANEL: CPT | Performed by: FAMILY MEDICINE

## 2024-06-04 PROCEDURE — 85025 COMPLETE CBC W/AUTO DIFF WBC: CPT | Performed by: FAMILY MEDICINE

## 2024-06-04 RX ORDER — ZOLPIDEM TARTRATE 5 MG/1
5 TABLET ORAL
Qty: 45 TABLET | Refills: 1 | Status: SHIPPED | OUTPATIENT
Start: 2024-06-04 | End: 2024-09-27

## 2024-06-04 RX ORDER — AMLODIPINE BESYLATE 5 MG/1
5 TABLET ORAL DAILY
Qty: 90 TABLET | Refills: 3 | Status: SHIPPED | OUTPATIENT
Start: 2024-06-04

## 2024-06-04 RX ORDER — TACROLIMUS 0.5 MG/1
0.5 CAPSULE ORAL 2 TIMES DAILY
Qty: 180 CAPSULE | Refills: 0 | Status: SHIPPED | OUTPATIENT
Start: 2024-06-04 | End: 2024-08-11

## 2024-06-04 RX ORDER — METOPROLOL SUCCINATE 50 MG/1
50 TABLET, EXTENDED RELEASE ORAL DAILY
Qty: 90 TABLET | Refills: 3 | Status: SHIPPED | OUTPATIENT
Start: 2024-06-04

## 2024-06-04 RX ORDER — TACROLIMUS 1 MG/1
1 CAPSULE ORAL 2 TIMES DAILY
Qty: 180 CAPSULE | Refills: 0 | Status: SHIPPED | OUTPATIENT
Start: 2024-06-04 | End: 2024-08-11

## 2024-06-04 ASSESSMENT — ANXIETY QUESTIONNAIRES
GAD7 TOTAL SCORE: 14
7. FEELING AFRAID AS IF SOMETHING AWFUL MIGHT HAPPEN: MORE THAN HALF THE DAYS
5. BEING SO RESTLESS THAT IT IS HARD TO SIT STILL: MORE THAN HALF THE DAYS
6. BECOMING EASILY ANNOYED OR IRRITABLE: MORE THAN HALF THE DAYS
IF YOU CHECKED OFF ANY PROBLEMS ON THIS QUESTIONNAIRE, HOW DIFFICULT HAVE THESE PROBLEMS MADE IT FOR YOU TO DO YOUR WORK, TAKE CARE OF THINGS AT HOME, OR GET ALONG WITH OTHER PEOPLE: VERY DIFFICULT
3. WORRYING TOO MUCH ABOUT DIFFERENT THINGS: MORE THAN HALF THE DAYS
1. FEELING NERVOUS, ANXIOUS, OR ON EDGE: MORE THAN HALF THE DAYS
GAD7 TOTAL SCORE: 14
2. NOT BEING ABLE TO STOP OR CONTROL WORRYING: MORE THAN HALF THE DAYS

## 2024-06-04 ASSESSMENT — PATIENT HEALTH QUESTIONNAIRE - PHQ9
SUM OF ALL RESPONSES TO PHQ QUESTIONS 1-9: 12
SUM OF ALL RESPONSES TO PHQ QUESTIONS 1-9: 12
10. IF YOU CHECKED OFF ANY PROBLEMS, HOW DIFFICULT HAVE THESE PROBLEMS MADE IT FOR YOU TO DO YOUR WORK, TAKE CARE OF THINGS AT HOME, OR GET ALONG WITH OTHER PEOPLE: VERY DIFFICULT
5. POOR APPETITE OR OVEREATING: MORE THAN HALF THE DAYS

## 2024-06-04 NOTE — PROGRESS NOTES
Problem(s) Oriented visit        SUBJECTIVE:                                                    Nani Ford is a 58 year old female who presents to clinic today for the following health issues :  Recheck Medication (Fasting/Would like Dr Mann to take over her tacrolimus prescription because see him more then the dr who prescribed medication )  Here to resestablish care    1. Essential hypertension    - amLODIPine (NORVASC) 5 MG tablet; Take 1 tablet (5 mg) by mouth daily  Dispense: 90 tablet; Refill: 3  - metoprolol succinate ER (TOPROL XL) 50 MG 24 hr tablet; Take 1 tablet (50 mg) by mouth daily  Dispense: 90 tablet; Refill: 3    2. Scleroderma, diffuse (H)      3. Cannabis dependence (H)      4. Liver replaced by transplant (H)    - tacrolimus (GENERIC EQUIVALENT) 1 MG capsule; Take 1 capsule (1 mg) by mouth 2 times daily Rx managed by transplant team.  Dispense: 180 capsule; Refill: 0  - tacrolimus (GENERIC EQUIVALENT) 0.5 MG capsule; Take 1 capsule (0.5 mg) by mouth 2 times daily Managed by pt's transplant team at [place]  Dispense: 180 capsule; Refill: 0    5. Raynaud's phenomenon without gangrene      6. Primary hypertension      7. Coronary artery disease involving native coronary artery of native heart without angina pectoris    - Lipid Profile ([facility])    8. Moderate episode of recurrent major depressive disorder (H)    - FLUoxetine (PROZAC) 20 MG capsule; Take 2 capsules (40 mg) by mouth daily  Dispense: 180 capsule; Refill: 1    9. Kidney replaced by transplant      10. Encounter for screening mammogram for malignant neoplasm of breast    - MA Screening Bilateral w/ Richie; Future    11. Screening for colon cancer    - [place]([employer]); Future    12. Insomnia due to medical condition    - zolpidem (AMBIEN) 5 MG tablet; Take 1 tablet (5 mg) by mouth nightly as needed  Dispense: 45 tablet; Refill: 1         Problem list, Medication list, Allergies, and Medical/Social/Surgical histories reviewed  in EPIC and updated as appropriate.   Additional history: as documented    ROS:  General and CV completed and negative except as noted above    Histories: reviewed    OBJECTIVE:                                                    /89   Pulse 55   Wt 62.5 kg (137 lb 12.8 oz)   SpO2 97%   BMI 25.62 kg/m    Body mass index is 25.62 kg/m .   APPEARANCE: = Relaxed and in no distress  Resp effort = Calm regular breathing  Breath Sounds = Good air movement with no rales or rhonchi in any lung fields  Heart Rate, Rhythm, & sounds (no Murm)  = Regular rate and rhythm with no S3, S4, or murmur appreciated.     ASSESSMENT/PLAN:                                                    Quality gaps reviewed    Nani was seen today for recheck medication.    Diagnoses and all orders for this visit:    Raynaud's phenomenon without gangrene  Stable on amlodapine    Essential hypertension  Reviewed her current HTN management. Discussed our goal for her is a systolic pressure at or below 128 and diastolic pressure at or below 83.  We today managed her prescriptions with refills ensured to ensure availabilty of current medications.  Discussed the importance for aggressive management of HTN to prevent vascular complications later.  Recommended lower fat, lower carbohydrate, and lower sodium (<2000 mg)diet. Required intervals for follow up on HTN, lab studies reviewed.    Strongly recommened she follow her blood pressures outside the clinic to ensure that BPs are remaining within guidelines,.  Instructed to contact me if the readings are not within guidelines on a regular basis so we can adjust treatment as needed.    -     amLODIPine (NORVASC) 5 MG tablet; Take 1 tablet (5 mg) by mouth daily  -     metoprolol succinate ER (TOPROL XL) 50 MG 24 hr tablet; Take 1 tablet (50 mg) by mouth daily    Scleroderma, diffuse (H)  Stable currently on no current meds    Cannabis dependence (H)  Feels it helps the depression and pain and has not  increased    Liver replaced by transplant (H)  [facility] has cut off of this antirejection drug, will b eout by tomorrow,needs a refill and then we need to have a plan  -     tacrolimus (GENERIC EQUIVALENT) 1 MG capsule; Take 1 capsule (1 mg) by mouth 2 times daily Rx managed by transplant team.  -     tacrolimus (GENERIC EQUIVALENT) 0.5 MG capsule; Take 1 capsule (0.5 mg) by mouth 2 times daily Managed by pt's transplant team at [place]    Coronary artery disease involving native coronary artery of native heart without angina pectoris  No current ANgina-     Lipid Profile ([facility])    Moderate episode of recurrent major depressive disorder (H)  Spoke at length about mood disorders including suspected pathophysiology, role of neurotransmitters, and treatment options including medication options ( especially SSRIs that treat cause of sx) and counselling.  Tolerating current meds. We discussed ongoing management of her  medications and how we will refill the medications now and in the future.  PATIENT HEALTH QUESTIONNAIRE-9 (PHQ - 9)    Over the last 2 weeks, how often have you been bothered by any of the following problems?    1. Little interest or pleasure in doing things -  More than half the days   2. Feeling down, depressed, or hopeless -  More than half the days   3. Trouble falling or staying asleep, or sleeping too much - More than half the days   4. Feeling tired or having little energy -  More than half the days   5. Poor appetite or overeating -  Several days   6. Feeling bad about yourself - or that you are a failure or have let yourself or your family down -  More than half the days   7. Trouble concentrating on things, such as reading the newspaper or watching television - Several days   8. Moving or speaking so slowly that other people could have noticed? Or the opposite - being so fidgety or restless that you have been moving around a lot more than usual Not at all   9. Thoughts that you would be better off  dead or of hurting  yourself in some way Not at all   Total Score: 12     If you checked off any problems, how difficult have these problems made it for you to do your work, take care of things at home, or get along with other people? Very difficult    Developed by Gina Ellis, Joselyn Guzman, Wyatt Falcon and colleagues, with an educational ivett from [employer] No permission required to reproduce, translate, display or distribute. permission required to reproduce, translate, display or distribute.    -     FLUoxetine (PROZAC) 20 MG capsule; Take 2 capsules (40 mg) by mouth daily    Kidney replaced by transplant  On antirejectipon  Encounter for screening mammogram for malignant neoplasm of breast  -     MA Screening Bilateral w/ Richie; Future    Screening for colon cancer  -     COLOGUARD([employer]); Future    Insomnia due to medical condition  We discussed sleep hygiene, stimulus control and sleep restriction. He was advised to avoid caffeine within 6 hours of bed, avoid alcohol at night, avoid late meals and late exercise, avoid late physical activity. He was advised to keep his sleep environment cool, dark and quiet. He was advised to try to keep a regular sleep-wake schedule, which he does for the most part, and avoid naps, which he does. For stimulus control, he was advised to avoid being in bed for more than 20 minutes if unable to sleep. If he does get out of bed, he should keep the lights dim, read a book that is not particularly entertaining, and return to sleep if he starts feeling drowsy.    -     zolpidem (AMBIEN) 5 MG tablet; Take 1 tablet (5 mg) by mouth nightly as needed    Schedulae a PE soon.        Regular exercise    Health maintenance items are reviewed in Epic and correct as of today:    Tavo Mann MD  [facility]  Family Practice  [facility]  [phone]    For any issues my office # is [phone]

## 2024-06-05 NOTE — RESULT ENCOUNTER NOTE
Dear Nani,     I am writing to report that your included test results are as expected.    Many labs contain some results that are slightly outside of the normal range, I have reviewed any of these results and they require no changes at this time.    Tavo Mann MD

## 2024-06-25 DIAGNOSIS — R51.9 NONINTRACTABLE HEADACHE, UNSPECIFIED CHRONICITY PATTERN, UNSPECIFIED HEADACHE TYPE: ICD-10-CM

## 2024-06-26 RX ORDER — BUTALBITAL, ACETAMINOPHEN AND CAFFEINE 50; 325; 40 MG/1; MG/1; MG/1
1 TABLET ORAL EVERY 4 HOURS PRN
Qty: 15 TABLET | Refills: 3 | Status: SHIPPED | OUTPATIENT
Start: 2024-06-26 | End: 2024-08-07

## 2024-08-07 DIAGNOSIS — R51.9 NONINTRACTABLE HEADACHE, UNSPECIFIED CHRONICITY PATTERN, UNSPECIFIED HEADACHE TYPE: ICD-10-CM

## 2024-08-07 RX ORDER — BUTALBITAL, ACETAMINOPHEN AND CAFFEINE 50; 325; 40 MG/1; MG/1; MG/1
1 TABLET ORAL EVERY 4 HOURS PRN
Qty: 15 TABLET | Refills: 3 | Status: SHIPPED | OUTPATIENT
Start: 2024-08-07 | End: 2024-09-10

## 2024-08-10 DIAGNOSIS — I25.10 CORONARY ARTERY DISEASE INVOLVING NATIVE CORONARY ARTERY OF NATIVE HEART WITHOUT ANGINA PECTORIS: ICD-10-CM

## 2024-08-10 DIAGNOSIS — Z94.4 LIVER REPLACED BY TRANSPLANT (H): ICD-10-CM

## 2024-08-11 RX ORDER — TACROLIMUS 1 MG/1
1 CAPSULE ORAL 2 TIMES DAILY
Qty: 180 CAPSULE | Refills: 0 | Status: SHIPPED | OUTPATIENT
Start: 2024-08-11

## 2024-08-11 RX ORDER — TACROLIMUS 0.5 MG/1
0.5 CAPSULE ORAL 2 TIMES DAILY
Qty: 180 CAPSULE | Refills: 0 | Status: SHIPPED | OUTPATIENT
Start: 2024-08-11

## 2024-08-11 RX ORDER — ATORVASTATIN CALCIUM 40 MG/1
40 TABLET, FILM COATED ORAL DAILY
Qty: 90 TABLET | Refills: 1 | Status: SHIPPED | OUTPATIENT
Start: 2024-08-11

## 2024-09-10 DIAGNOSIS — R51.9 NONINTRACTABLE HEADACHE, UNSPECIFIED CHRONICITY PATTERN, UNSPECIFIED HEADACHE TYPE: ICD-10-CM

## 2024-09-10 RX ORDER — BUTALBITAL, ACETAMINOPHEN AND CAFFEINE 50; 325; 40 MG/1; MG/1; MG/1
1 TABLET ORAL EVERY 4 HOURS PRN
Qty: 15 TABLET | Refills: 3 | Status: SHIPPED | OUTPATIENT
Start: 2024-09-10 | End: 2024-10-07

## 2024-09-26 DIAGNOSIS — G47.01 INSOMNIA DUE TO MEDICAL CONDITION: ICD-10-CM

## 2024-09-26 NOTE — TELEPHONE ENCOUNTER
CONTROLLED SUBSTANCE REFILL REQUEST FOR Zolpidem     DOSE: 5 mg - # 15  SIG: Take 1 tablet (5 mg) by mouth nightly as needed       LAST REFILL: 9/10/24    LAST APPT RELATED: 6/4/24    NEXT APPT: No current future appointments scheduled at [facility]       CONTROLLED SUBSTANCE AGREEMENT: 7/22/21    URINE DRUG SCREEN: none        FILL HISTORY PER :            REFILL ROUTED TO  FOR REVIEW

## 2024-09-27 DIAGNOSIS — K21.9 GASTROESOPHAGEAL REFLUX DISEASE WITHOUT ESOPHAGITIS: ICD-10-CM

## 2024-09-27 RX ORDER — OMEPRAZOLE 40 MG/1
40 CAPSULE, DELAYED RELEASE ORAL 2 TIMES DAILY
Qty: 180 CAPSULE | Refills: 1 | Status: SHIPPED | OUTPATIENT
Start: 2024-09-27

## 2024-09-27 RX ORDER — ZOLPIDEM TARTRATE 5 MG/1
5 TABLET ORAL
Qty: 15 TABLET | Refills: 0 | Status: SHIPPED | OUTPATIENT
Start: 2024-09-27

## 2024-10-07 DIAGNOSIS — R51.9 NONINTRACTABLE HEADACHE, UNSPECIFIED CHRONICITY PATTERN, UNSPECIFIED HEADACHE TYPE: ICD-10-CM

## 2024-10-07 RX ORDER — BUTALBITAL, ACETAMINOPHEN AND CAFFEINE 50; 325; 40 MG/1; MG/1; MG/1
1 TABLET ORAL EVERY 4 HOURS PRN
Qty: 15 TABLET | Refills: 3 | Status: SHIPPED | OUTPATIENT
Start: 2024-10-07 | End: 2024-10-30

## 2024-10-14 DIAGNOSIS — G47.01 INSOMNIA DUE TO MEDICAL CONDITION: ICD-10-CM

## 2024-10-15 RX ORDER — ZOLPIDEM TARTRATE 5 MG/1
5 TABLET ORAL
Qty: 15 TABLET | Refills: 5 | Status: SHIPPED | OUTPATIENT
Start: 2024-10-15

## 2024-10-30 DIAGNOSIS — R51.9 NONINTRACTABLE HEADACHE, UNSPECIFIED CHRONICITY PATTERN, UNSPECIFIED HEADACHE TYPE: ICD-10-CM

## 2024-10-30 RX ORDER — BUTALBITAL, ACETAMINOPHEN AND CAFFEINE 50; 325; 40 MG/1; MG/1; MG/1
1 TABLET ORAL EVERY 4 HOURS PRN
Qty: 15 TABLET | Refills: 3 | Status: SHIPPED | OUTPATIENT
Start: 2024-10-30

## 2024-11-08 DIAGNOSIS — Z94.4 LIVER REPLACED BY TRANSPLANT (H): ICD-10-CM

## 2024-11-08 NOTE — TELEPHONE ENCOUNTER
Med: Tacrolimus    LOV (related): 6/4/24      Due for F/U around: Not specified    Next Appt: Not scheduled

## 2024-11-10 RX ORDER — TACROLIMUS 1 MG/1
1 CAPSULE ORAL 2 TIMES DAILY
Qty: 180 CAPSULE | Refills: 0 | Status: SHIPPED | OUTPATIENT
Start: 2024-11-10

## 2024-11-12 DIAGNOSIS — Z94.4 LIVER REPLACED BY TRANSPLANT (H): ICD-10-CM

## 2024-11-12 RX ORDER — TACROLIMUS 0.5 MG/1
0.5 CAPSULE ORAL 2 TIMES DAILY
Qty: 180 CAPSULE | Refills: 0 | OUTPATIENT
Start: 2024-11-12

## 2024-11-25 DIAGNOSIS — R51.9 NONINTRACTABLE HEADACHE, UNSPECIFIED CHRONICITY PATTERN, UNSPECIFIED HEADACHE TYPE: ICD-10-CM

## 2024-11-26 RX ORDER — BUTALBITAL, ACETAMINOPHEN AND CAFFEINE 50; 325; 40 MG/1; MG/1; MG/1
1 TABLET ORAL EVERY 4 HOURS PRN
Qty: 15 TABLET | Refills: 3 | Status: SHIPPED | OUTPATIENT
Start: 2024-11-26

## 2024-12-19 DIAGNOSIS — R51.9 NONINTRACTABLE HEADACHE, UNSPECIFIED CHRONICITY PATTERN, UNSPECIFIED HEADACHE TYPE: ICD-10-CM

## 2024-12-19 RX ORDER — BUTALBITAL, ACETAMINOPHEN AND CAFFEINE 50; 325; 40 MG/1; MG/1; MG/1
1 TABLET ORAL EVERY 4 HOURS PRN
Qty: 15 TABLET | Refills: 3 | Status: SHIPPED | OUTPATIENT
Start: 2024-12-19

## 2024-12-26 ENCOUNTER — PATIENT OUTREACH (OUTPATIENT)
Dept: CARE COORDINATION | Facility: CLINIC | Age: 59
End: 2024-12-26
Payer: COMMERCIAL

## 2025-01-02 DIAGNOSIS — F33.1 MODERATE EPISODE OF RECURRENT MAJOR DEPRESSIVE DISORDER (H): ICD-10-CM

## 2025-01-06 DIAGNOSIS — K21.9 GASTROESOPHAGEAL REFLUX DISEASE WITHOUT ESOPHAGITIS: ICD-10-CM

## 2025-01-06 RX ORDER — OMEPRAZOLE 40 MG/1
40 CAPSULE, DELAYED RELEASE ORAL 2 TIMES DAILY
Qty: 180 CAPSULE | Refills: 1 | Status: SHIPPED | OUTPATIENT
Start: 2025-01-06

## 2025-01-26 DIAGNOSIS — I25.10 CORONARY ARTERY DISEASE INVOLVING NATIVE CORONARY ARTERY OF NATIVE HEART WITHOUT ANGINA PECTORIS: ICD-10-CM

## 2025-01-26 RX ORDER — ATORVASTATIN CALCIUM 40 MG/1
40 TABLET, FILM COATED ORAL DAILY
Qty: 90 TABLET | Refills: 1 | Status: SHIPPED | OUTPATIENT
Start: 2025-01-26

## 2025-01-27 ENCOUNTER — MEDICAL CORRESPONDENCE (OUTPATIENT)
Dept: FAMILY MEDICINE | Facility: CLINIC | Age: 60
End: 2025-01-27

## 2025-01-27 DIAGNOSIS — Z79.899 ENCOUNTER FOR LONG-TERM (CURRENT) USE OF MEDICATIONS: ICD-10-CM

## 2025-01-27 DIAGNOSIS — Z94.4 LIVER REPLACED BY TRANSPLANT (H): Primary | ICD-10-CM

## 2025-01-27 NOTE — TELEPHONE ENCOUNTER
"Med: Atorvastatin    LOV (related): 6/4/24 with Dr. Mann     Last Lab: 6/4/24      Due for F/U around: 6/2025     Next Appt: None        Cholesterol   Date Value Ref Range Status   06/04/2024 165 100 - 199 mg/dL Final   04/14/2022 266 (A) 100 - 199 mg/dl Final   10/18/2019 142 <200 mg/dL Final   10/10/2019 204 (H) <200 mg/dL Final     Comment:     Desirable:       <200 mg/dl     HDL Cholesterol   Date Value Ref Range Status   10/18/2019 45 (L) >49 mg/dL Final   10/10/2019 47 (L) >49 mg/dL Final     HDL   Date Value Ref Range Status   06/04/2024 44 >=40 mg/dL Final   04/14/2022 52 >=40 mg/dl Final     LDL Cholesterol Calculated   Date Value Ref Range Status   10/18/2019 70 <100 mg/dL Final     Comment:     Desirable:       <100 mg/dl   10/10/2019 144 (H) <100 mg/dL Final     Comment:     Above desirable:  100-129 mg/dl  Borderline High:  130-159 mg/dL  High:             160-189 mg/dL  Very high:       >189 mg/dl       LDL CALCULATED ([facility])   Date Value Ref Range Status   06/04/2024 93 0 - 130 mg/dL Final   04/14/2022 189 (A) 0 - 130 mg/dl Final     Triglycerides   Date Value Ref Range Status   06/04/2024 138 0 - 149 mg/dL Final   04/14/2022 120 0 - 149 mg/dl Final   10/18/2019 133 <150 mg/dL Final     Comment:     Fasting specimen   10/10/2019 66 <150 mg/dL Final     No results found for: \"CHOLHDLRATIO\"    "

## 2025-01-30 DIAGNOSIS — R51.9 NONINTRACTABLE HEADACHE, UNSPECIFIED CHRONICITY PATTERN, UNSPECIFIED HEADACHE TYPE: ICD-10-CM

## 2025-01-31 NOTE — TELEPHONE ENCOUNTER
CONTROLLED SUBSTANCE REFILL REQUEST FOR QUPILY-NOMRYIRB-AFCZ -40  DOSE: -40 mg  - # 15  SIG: TAKE 1 TABLET BY MOUTH EVERY 4 HOURS AS NEEDED FOR HEADACHES       LAST REFILL: 1/17/25    LAST APPT RELATED: 3/5/24     NEXT APPT: No current future appointments scheduled at [facility] [place] [facility]       CONTROLLED SUBSTANCE AGREEMENT: 7/22/21    URINE DRUG SCREEN: none         FILL HISTORY PER :          REFILL ROUTED TO  FOR REVIEW

## 2025-02-02 RX ORDER — BUTALBITAL, ACETAMINOPHEN AND CAFFEINE 50; 325; 40 MG/1; MG/1; MG/1
1 TABLET ORAL EVERY 4 HOURS PRN
Qty: 15 TABLET | Refills: 2 | Status: SHIPPED | OUTPATIENT
Start: 2025-02-02 | End: 2025-02-05

## 2025-02-05 ENCOUNTER — MYC REFILL (OUTPATIENT)
Dept: FAMILY MEDICINE | Facility: CLINIC | Age: 60
End: 2025-02-05

## 2025-02-05 DIAGNOSIS — R51.9 NONINTRACTABLE HEADACHE, UNSPECIFIED CHRONICITY PATTERN, UNSPECIFIED HEADACHE TYPE: ICD-10-CM

## 2025-02-05 RX ORDER — BUTALBITAL, ACETAMINOPHEN AND CAFFEINE 50; 325; 40 MG/1; MG/1; MG/1
1 TABLET ORAL EVERY 4 HOURS PRN
Qty: 15 TABLET | Refills: 2 | Status: SHIPPED | OUTPATIENT
Start: 2025-02-05

## 2025-02-24 RX ORDER — BUTALBITAL, ACETAMINOPHEN AND CAFFEINE 50; 325; 40 MG/1; MG/1; MG/1
1 TABLET ORAL EVERY 4 HOURS PRN
Qty: 15 TABLET | Refills: 2 | OUTPATIENT
Start: 2025-02-24

## 2025-02-27 DIAGNOSIS — R51.9 NONINTRACTABLE HEADACHE, UNSPECIFIED CHRONICITY PATTERN, UNSPECIFIED HEADACHE TYPE: ICD-10-CM

## 2025-02-28 NOTE — CONFIDENTIAL NOTE
CONTROLLED SUBSTANCE REFILL REQUEST FOR BUTALBITAL-ACETAMINOPHEN-CAFFEINE  DOSE: -40 MG - # 15  SI TAB EVERY 4 HOURS PRN FOR HEADACHES      LAST REFILL: 25    LAST APPT RELATED: 3/5/24  LOV (UNRELATED MEDS): 24    NEXT APPT: NONE  *OVERDUE FOR MED CHECK      CONTROLLED SUBSTANCE AGREEMENT: DUE    URINE DRUG SCREEN: DUE        FILL HISTORY PER :          REFILL ROUTED TO DR GUTIERREZ FOR REVIEW    Sherron Kirk, CMA

## 2025-03-02 RX ORDER — BUTALBITAL, ACETAMINOPHEN AND CAFFEINE 50; 325; 40 MG/1; MG/1; MG/1
1 TABLET ORAL EVERY 4 HOURS PRN
Qty: 15 TABLET | Refills: 0 | Status: SHIPPED | OUTPATIENT
Start: 2025-03-02

## 2025-03-22 DIAGNOSIS — I10 ESSENTIAL HYPERTENSION: ICD-10-CM

## 2025-03-23 RX ORDER — AMLODIPINE BESYLATE 5 MG/1
5 TABLET ORAL DAILY
Qty: 90 TABLET | Refills: 3 | Status: SHIPPED | OUTPATIENT
Start: 2025-03-23

## 2025-03-23 RX ORDER — METOPROLOL SUCCINATE 50 MG/1
50 TABLET, EXTENDED RELEASE ORAL DAILY
Qty: 90 TABLET | Refills: 3 | Status: SHIPPED | OUTPATIENT
Start: 2025-03-23

## 2025-04-11 ENCOUNTER — MYC REFILL (OUTPATIENT)
Dept: FAMILY MEDICINE | Facility: CLINIC | Age: 60
End: 2025-04-11

## 2025-04-11 DIAGNOSIS — R51.9 NONINTRACTABLE HEADACHE, UNSPECIFIED CHRONICITY PATTERN, UNSPECIFIED HEADACHE TYPE: ICD-10-CM

## 2025-04-11 DIAGNOSIS — G47.01 INSOMNIA DUE TO MEDICAL CONDITION: ICD-10-CM

## 2025-04-11 NOTE — CONFIDENTIAL NOTE
CONTROLLED SUBSTANCE REFILL REQUEST FOR ZOLPIDEM  DOSE: 5 MG - # 15. 5 REFILLS  SI TAB NIGHTLY PRN      LAST REFILL: 3/30/25    FILL HISTORY PER :            CONTROLLED SUBSTANCE REFILL REQUEST FOR BUTALBUTAL-ACETAMINOPHEN-CAFFEINE  DOSE: -40 MG - # 15  SI TAB EVERY 4 HOURS PRN FOR HEADACHES      LAST REFILL: 3/3/25    FILL HISTORY PER :          LAST APPT RELATED: 24 - MEDS    NEXT APPT: 25      CONTROLLED SUBSTANCE AGREEMENT: DUE    URINE DRUG SCREEN: DUE      REFILL ROUTED TO DR GUTIERREZ FOR REVIEW    Sherron Kirk, CMA

## 2025-04-14 RX ORDER — ZOLPIDEM TARTRATE 5 MG/1
5 TABLET ORAL
Qty: 15 TABLET | Refills: 5 | Status: SHIPPED | OUTPATIENT
Start: 2025-04-16

## 2025-04-14 RX ORDER — BUTALBITAL, ACETAMINOPHEN AND CAFFEINE 50; 325; 40 MG/1; MG/1; MG/1
1 TABLET ORAL EVERY 4 HOURS PRN
Qty: 15 TABLET | Refills: 0 | Status: SHIPPED | OUTPATIENT
Start: 2025-04-14

## 2025-04-30 ENCOUNTER — OFFICE VISIT (OUTPATIENT)
Dept: FAMILY MEDICINE | Facility: CLINIC | Age: 60
End: 2025-04-30

## 2025-04-30 VITALS
HEART RATE: 65 BPM | SYSTOLIC BLOOD PRESSURE: 129 MMHG | HEIGHT: 61 IN | WEIGHT: 140.2 LBS | BODY MASS INDEX: 26.47 KG/M2 | OXYGEN SATURATION: 97 % | DIASTOLIC BLOOD PRESSURE: 55 MMHG | TEMPERATURE: 98.5 F

## 2025-04-30 DIAGNOSIS — R05.8 PRODUCTIVE COUGH: ICD-10-CM

## 2025-04-30 DIAGNOSIS — J06.9 UPPER RESPIRATORY TRACT INFECTION, UNSPECIFIED TYPE: Primary | ICD-10-CM

## 2025-04-30 DIAGNOSIS — R51.9 NONINTRACTABLE HEADACHE, UNSPECIFIED CHRONICITY PATTERN, UNSPECIFIED HEADACHE TYPE: ICD-10-CM

## 2025-04-30 DIAGNOSIS — D84.9 IMMUNOSUPPRESSION: ICD-10-CM

## 2025-04-30 DIAGNOSIS — Z94.4 LIVER REPLACED BY TRANSPLANT (H): ICD-10-CM

## 2025-04-30 DIAGNOSIS — E78.5 HYPERLIPIDEMIA WITH TARGET LDL LESS THAN 100: ICD-10-CM

## 2025-04-30 DIAGNOSIS — Z94.0 KIDNEY REPLACED BY TRANSPLANT: ICD-10-CM

## 2025-04-30 DIAGNOSIS — M34.9 SCLERODERMA, DIFFUSE (H): ICD-10-CM

## 2025-04-30 DIAGNOSIS — F12.20 CANNABIS DEPENDENCE (H): ICD-10-CM

## 2025-04-30 DIAGNOSIS — R42 LIGHT HEADED: ICD-10-CM

## 2025-04-30 DIAGNOSIS — F33.1 MODERATE EPISODE OF RECURRENT MAJOR DEPRESSIVE DISORDER (H): ICD-10-CM

## 2025-04-30 LAB
% GRANULOCYTES: 82.7 % (ref 42.2–75.2)
ALBUMIN SERPL BCG-MCNC: 4.3 G/DL (ref 3.5–5.2)
ALP SERPL-CCNC: 61 U/L (ref 40–150)
ALT SERPL W P-5'-P-CCNC: 13 U/L (ref 0–50)
ANION GAP SERPL CALCULATED.3IONS-SCNC: 10 MMOL/L (ref 7–15)
AST SERPL W P-5'-P-CCNC: 20 U/L (ref 0–45)
BILIRUB SERPL-MCNC: 0.2 MG/DL
BUN SERPL-MCNC: 18.3 MG/DL (ref 6–20)
CALCIUM SERPL-MCNC: 9.7 MG/DL (ref 8.8–10.4)
CHLORIDE SERPL-SCNC: 103 MMOL/L (ref 98–107)
CHOLESTEROL: 161 MG/DL (ref 100–199)
CREAT SERPL-MCNC: 1.23 MG/DL (ref 0.51–0.95)
EGFRCR SERPLBLD CKD-EPI 2021: 51 ML/MIN/1.73M2
FASTING STATUS PATIENT QL REPORTED: YES
FASTING?: YES
GLUCOSE SERPL-MCNC: 100 MG/DL (ref 70–99)
HCO3 SERPL-SCNC: 26 MMOL/L (ref 22–29)
HCT VFR BLD AUTO: 39 % (ref 35–46)
HDL (RMG): 48 MG/DL (ref 40–?)
HEMOGLOBIN: 13.5 G/DL (ref 11.8–15.5)
INFLUENZA A (RMG): NEGATIVE
INFLUENZA B (RMG): NEGATIVE
LDL CALCULATED (RMG): 89 MG/DL (ref 0–130)
LYMPHOCYTES NFR BLD AUTO: 13.2 % (ref 20.5–51.1)
MCH RBC QN AUTO: 29.5 PG (ref 27–31)
MCHC RBC AUTO-ENTMCNC: 34.6 G/DL (ref 33–37)
MCV RBC AUTO: 85.2 FL (ref 80–100)
MONOCYTES NFR BLD AUTO: 4.1 % (ref 1.7–9.3)
PLATELET # BLD AUTO: 228 K/UL (ref 140–450)
POTASSIUM SERPL-SCNC: 4.4 MMOL/L (ref 3.4–5.3)
PROT SERPL-MCNC: 7.8 G/DL (ref 6.4–8.3)
RBC # BLD AUTO: 4.57 X10/CMM (ref 3.7–5.2)
SARS ANTIGEN (RMG): NEGATIVE
SODIUM SERPL-SCNC: 139 MMOL/L (ref 135–145)
TRIGLYCERIDES (RMG): 120 MG/DL (ref 0–149)
TSH SERPL DL<=0.005 MIU/L-ACNC: 2.32 UIU/ML (ref 0.3–4.2)
WBC # BLD AUTO: 7.1 X10/CMM (ref 3.8–11)

## 2025-04-30 PROCEDURE — 80061 LIPID PANEL: CPT | Mod: QW | Performed by: FAMILY MEDICINE

## 2025-04-30 PROCEDURE — 85025 COMPLETE CBC W/AUTO DIFF WBC: CPT | Performed by: FAMILY MEDICINE

## 2025-04-30 PROCEDURE — 36415 COLL VENOUS BLD VENIPUNCTURE: CPT | Performed by: FAMILY MEDICINE

## 2025-04-30 PROCEDURE — 87428 SARSCOV & INF VIR A&B AG IA: CPT | Performed by: FAMILY MEDICINE

## 2025-04-30 PROCEDURE — 84443 ASSAY THYROID STIM HORMONE: CPT | Mod: 90 | Performed by: FAMILY MEDICINE

## 2025-04-30 PROCEDURE — 80053 COMPREHEN METABOLIC PANEL: CPT | Performed by: FAMILY MEDICINE

## 2025-04-30 PROCEDURE — 99214 OFFICE O/P EST MOD 30 MIN: CPT | Performed by: FAMILY MEDICINE

## 2025-04-30 PROCEDURE — 3074F SYST BP LT 130 MM HG: CPT | Performed by: FAMILY MEDICINE

## 2025-04-30 PROCEDURE — 3078F DIAST BP <80 MM HG: CPT | Performed by: FAMILY MEDICINE

## 2025-04-30 RX ORDER — BUTALBITAL, ACETAMINOPHEN AND CAFFEINE 50; 325; 40 MG/1; MG/1; MG/1
1 TABLET ORAL EVERY 4 HOURS PRN
Qty: 15 TABLET | Refills: 4 | Status: SHIPPED | OUTPATIENT
Start: 2025-04-30

## 2025-04-30 RX ORDER — AZITHROMYCIN 250 MG/1
TABLET, FILM COATED ORAL
Qty: 6 TABLET | Refills: 0 | Status: SHIPPED | OUTPATIENT
Start: 2025-04-30

## 2025-04-30 ASSESSMENT — ANXIETY QUESTIONNAIRES
2. NOT BEING ABLE TO STOP OR CONTROL WORRYING: MORE THAN HALF THE DAYS
1. FEELING NERVOUS, ANXIOUS, OR ON EDGE: MORE THAN HALF THE DAYS
4. TROUBLE RELAXING: MORE THAN HALF THE DAYS
GAD7 TOTAL SCORE: 14
7. FEELING AFRAID AS IF SOMETHING AWFUL MIGHT HAPPEN: MORE THAN HALF THE DAYS
GAD7 TOTAL SCORE: 14
7. FEELING AFRAID AS IF SOMETHING AWFUL MIGHT HAPPEN: MORE THAN HALF THE DAYS
3. WORRYING TOO MUCH ABOUT DIFFERENT THINGS: MORE THAN HALF THE DAYS
IF YOU CHECKED OFF ANY PROBLEMS ON THIS QUESTIONNAIRE, HOW DIFFICULT HAVE THESE PROBLEMS MADE IT FOR YOU TO DO YOUR WORK, TAKE CARE OF THINGS AT HOME, OR GET ALONG WITH OTHER PEOPLE: VERY DIFFICULT
5. BEING SO RESTLESS THAT IT IS HARD TO SIT STILL: MORE THAN HALF THE DAYS
8. IF YOU CHECKED OFF ANY PROBLEMS, HOW DIFFICULT HAVE THESE MADE IT FOR YOU TO DO YOUR WORK, TAKE CARE OF THINGS AT HOME, OR GET ALONG WITH OTHER PEOPLE?: VERY DIFFICULT
GAD7 TOTAL SCORE: 14
6. BECOMING EASILY ANNOYED OR IRRITABLE: MORE THAN HALF THE DAYS

## 2025-04-30 ASSESSMENT — PATIENT HEALTH QUESTIONNAIRE - PHQ9
SUM OF ALL RESPONSES TO PHQ QUESTIONS 1-9: 13
SUM OF ALL RESPONSES TO PHQ QUESTIONS 1-9: 13
10. IF YOU CHECKED OFF ANY PROBLEMS, HOW DIFFICULT HAVE THESE PROBLEMS MADE IT FOR YOU TO DO YOUR WORK, TAKE CARE OF THINGS AT HOME, OR GET ALONG WITH OTHER PEOPLE: SOMEWHAT DIFFICULT

## 2025-04-30 NOTE — PROGRESS NOTES
"Got sick over the weekend for past 4 days.  Lots of uri symptoms.  Chest pain is burning with cough.    ROS:  General and Resp. completed and negative except as noted above    Histories: reviewed    OBJECTIVE:                                                    /55   Pulse 65   Temp 98.5  F (36.9  C)   Ht 1.554 m (5' 1.2\")   Wt 63.6 kg (140 lb 3.2 oz)   SpO2 97%   BMI 26.32 kg/m    Body mass index is 26.32 kg/m .   APPEARANCE: = Relaxed and in no distress  Conj/Eyelids = noninjected and lids and lashes are without inflammation  PERRLA/Irises = Pupils Round Reactive to Light and Irisis without inflammation  Ears/Nose = External structures and Nares have usual shape and form  Ear canals and TM's = Canals are not inflammed and have none or little wax and the drums are not injected and have a light reflex   Lips/Teeth/Gums = No lesions seen, good dentition, and gums seem healthy  Oropharynx = No leukoplakia, No injection to the tissues, Normal Uvula  Neck = No anterior or posterior adenopathy appreciated.  Resp effort =  Mild Distress  Breath Sounds =  bilateral rhonchi  Mood/Affect = Cooperative and interested     ASSESSMENT/PLAN:                                                    Quality gaps reviewed    Nani was seen today for recheck medication, headache, consult and uri.    Diagnoses and all orders for this visit:      -     Influenza + SARS Antigen ([facility])    Scleroderma, diffuse (H)  [facility] docs manage  Cannabis dependence (H)  Cutting down      Moderate episode of recurrent major depressive disorder (H)    Liver replaced by transplant (H)  -     Comprehensive metabolic panel; Future  Kidney replaced by transplant after Liver Transplant medication in 90's caused damage.  Works with [employer] and just on meds from them and regular labs  Discussed importance of regular labs  -     Comprehensive metabolic panel; Future      Productive cough with Immunosuppression from transplant meds.  -     azithromycin " (ZITHROMAX) 250 MG tablet; Two tablets first day, then one tablet daily for four days.  -     CBC with Diff/Plt ([facility])    Light headed  Due for labs, likely related to infection  -     TSH; Future    Hyperlipidemia with target LDL less than 100  -     Lipid Profile ([facility])  Migraines  Discuss migraine heaches and vascular headache variants.  Discuss pathophysiology and treatment options inclduing OTC meds such as Tylenol or NSAIDS with food, triptan medicaitons such as Imitrex, maxalt, etc.  Reviewed their indications, proper dosing instructions.  Also discussed their side effetcs including tachycardia, chest pain, elevated BP.  Discussed some of the contributing features to migraine and items to avoid such as excessive alcohol, blue cheese, red wine, etc.    The longitudinal plan of care for the diagnosis(es)/condition(s) as documented were addressed during this visit. Due to the added complexity in care, I will continue to support Nani in the subsequent management and with ongoing continuity of care.    Regular exercise    Health maintenance items are reviewed in Epic and correct as of today:    Tavo Mann MD  [facility]  Family Practice  [facility]  [phone]    For any issues my office # is [phone]        Answers submitted by the patient for this visit:  Patient Health Questionnaire (Submitted on 4/30/2025)  If you checked off any problems, how difficult have these problems made it for you to do your work, take care of things at home, or get along with other people?: Somewhat difficult  PHQ9 TOTAL SCORE: 13  Patient Health Questionnaire (G7) (Submitted on 4/30/2025)  MIMI 7 TOTAL SCORE: 14  Migraine Visit Questionnaire (Submitted on 4/30/2025)  Chief Complaint: Chronic problems general questions HPI Form  Headache Symptoms are: worsened  How often are you getting headaches or migraines? : everyday  Are you able to do normal daily activities when you have a migraine?:  No  Migraine Rescue/Relief Medications:: Tylenol, other  Effectiveness of rescue/relief medications:: I get only a small amount of relief  Migraine Preventative Medications:: no medications to prevent migraines  ER or UC Visits:: 0 times  Depression / Anxiety Questionnaire (Submitted on 4/30/2025)  Chief Complaint: Chronic problems general questions HPI Form  Depression/Anxiety: Depression & Anxiety  Depression & Anxiety (Submitted on 4/30/2025)  Chief Complaint: Chronic problems general questions HPI Form  Status since last visit:: no change  Anxiety since last: : no change  Other associated symptoms of depression:: No  Other associated symotome: : No  Significant life event: : No  Anxious:: No  Current substance use:: No  General Questionnaire (Submitted on 4/30/2025)  Chief Complaint: Chronic problems general questions HPI Form  How many servings of fruits and vegetables do you eat daily?: 2-3  On average, how many sweetened beverages do you drink each day (Examples: soda, juice, sweet tea, etc.  Do NOT count diet or artificially sweetened beverages)?: 1  How many minutes a day do you exercise enough to make your heart beat faster?: 30 to 60  How many days a week do you exercise enough to make your heart beat faster?: 4  How many days per week do you miss taking your medication?: 0  Questionnaire about: Chronic problems general questions HPI Form (Submitted on 4/30/2025)  Chief Complaint: Chronic problems general questions HPI Form

## 2025-04-30 NOTE — LETTER
[facility]  04/30/25  Patient: Nani Ford  YOB: 1965  Medical Record Number: [id number]                                                                                  Non-Opioid Controlled Substance Agreement    This is an agreement between you and your provider regarding safe and appropriate use of controlled substances prescribed by your care team. Controlled substances are?medicines that can cause physical and mental dependence (abuse).     There are strict laws about having and using these medicines. We here at [facility] are  committed to working with you in your efforts to get better. To support you in this work, we'll help you schedule regular office appointments for medicine refills. If we must cancel or change your appointment for any reason, we'll make sure you have enough medicine to last until your next appointment.     As a Provider, I will:   Listen carefully to your concerns while treating you with respect.   Recommend a treatment plan that I believe is in your best interest and may involve therapies other than medicine.    Talk with you often about the possible benefits and the risk of harm of any medicine that we prescribe for you.  Assess the safety of this medicine and check how well it works.    Provide a plan on how to taper (discontinue or go off) using this medicine if the decision is made to stop its use.      ::  As a Patient, I understand controlled substances:     Are prescribed by my care provider to help me function or work and manage my condition(s).?  Are strong medicines and can cause serious side effects.     Need to be taken exactly as prescribed.?Combining controlled substances with certain medicines or chemicals (such as illegal drugs, alcohol, sedatives, sleeping pills, and benzodiazepines) can be dangerous or even fatal.? If I stop taking my medicines suddenly, I may have severe withdrawal symptoms.     The risks, benefits, and side  effects of these medicine(s) were explained to me. I agree that:    I will take part in other treatments as advised by my care team. This may be psychiatry or counseling, physical therapy, behavioral therapy, group treatment or a referral to specialist.    I will keep all my appointments and understand this is part of the monitoring of controlled substances.?My care team may require an office visit for EVERY controlled substance refill. If I miss appointments or don t follow instructions, my care team may stop my medicine    I will take my medicines as prescribed. I will not change the dose or schedule unless my care team tells me to. There will be no refills if I run out early.      I may be asked to come to the clinic and complete a urine drug test or complete a pill count. If I don t give a urine sample or participate in a pill count, the care team may stop my medicine.    I will only receive controlled substance prescriptions from this clinic. If I am treated by another provider, I will tell them that I am taking controlled substances and that I have a treatment agreement with this provider. I will inform my [facility] care team within one business day if I am given a prescription for any controlled substance by another healthcare provider. My [facility] care team can contact other providers and pharmacists about my use of any medicines.    It is up to me to make sure that I don't run out of my medicines on weekends or holidays.?If my care team is willing to refill my prescription without a visit, I must request refills only during office hours. Refills may take up to 3 business days to process. I will use one pharmacy to fill all my controlled substance prescriptions. I will notify the clinic about any changes to my insurance or medicine availability.    I am responsible for my prescriptions. If the medicine/prescription is lost, stolen or destroyed, it will not be replaced.?I also agree not to  share controlled substance medicines with anyone.     I am aware I should not use any illegal or recreational drugs. I agree not to drink alcohol unless my care team says I can.     If I enroll in the [place] [facility] Cannabis program, I will tell my care team before my next refill.    I will tell my care team right away if I become pregnant, have a new medical problem treated outside of my regular clinic, or have a change in my medicines.     I understand that this medicine can affect my thinking, judgment and reaction time.? Alcohol and drugs affect the brain and body, which can affect the safety of my driving. Being under the influence of alcohol or drugs can affect my decision-making, behaviors, personal safety and the safety of others. Driving while impaired (DWI) can occur if a person is driving, operating or in physical control of a car, motorcycle, boat, snowmobile, ATV, motorbike, off-road vehicle or any other motor vehicle ([place] Statute 169A.20). I understand the risk if I choose to drive or operate any vehicle or machinery.    I understand that if I do not follow any of the conditions above, my prescriptions or treatment may be stopped or changed.   I agree that my provider, clinic care team and pharmacy may work with any city, state or federal law enforcement agency that investigates the misuse, sale or other diversion of my controlled medicine. I will allow my provider to discuss my care with, or share a copy of, this agreement with any other treating provider, pharmacy or emergency room where I receive care.     I have read this agreement and have asked questions about anything I did not understand.    ________________________________________________________  Patient Signature - Nani Ford     ___________________                   Date     ________________________________________________________  Provider Signature - Tavo Mann MD       ___________________                   Date      ________________________________________________________  Witness Signature (required if provider not present while patient signing)          ___________________                   Date

## 2025-06-03 DIAGNOSIS — R51.9 NONINTRACTABLE HEADACHE, UNSPECIFIED CHRONICITY PATTERN, UNSPECIFIED HEADACHE TYPE: ICD-10-CM

## 2025-06-04 NOTE — CONFIDENTIAL NOTE
CONTROLLED SUBSTANCE REFILL REQUEST FOR BUTALBITAL-ACETAMINOPHEN-CAFFEINE  DOSE: -40 MG - # 15. 1 REFILL  SI TAB EVERY 4 HOURS PRN FOR HEADACHES      LAST REFILL: 25    LAST APPT RELATED: 25 - MEDS    NEXT APPT: NONE      CONTROLLED SUBSTANCE AGREEMENT: DUE    URINE DRUG SCREEN: DUE        FILL HISTORY PER :          REFILL ROUTED TO DR GUTIERREZ FOR REVIEW    Sherron Kirk, CMA

## 2025-06-05 RX ORDER — BUTALBITAL, ACETAMINOPHEN AND CAFFEINE 50; 325; 40 MG/1; MG/1; MG/1
1 TABLET ORAL EVERY 4 HOURS PRN
Qty: 15 TABLET | Refills: 1 | Status: SHIPPED | OUTPATIENT
Start: 2025-06-05

## 2025-06-18 DIAGNOSIS — F33.1 MODERATE EPISODE OF RECURRENT MAJOR DEPRESSIVE DISORDER (H): ICD-10-CM

## 2025-06-18 NOTE — CONFIDENTIAL NOTE
Med: FLUOXETINE    LOV (related): 4/30/25 - MEDS      Due for F/U around: NEVER HAD CPX - DUE 10/2025 (WITH 6 MONTH MED CHECK)    Next Appt: NONE    [facility] MESSAGE SENT THAT PATIENT IS DUE FOR APPT SOON          6/4/2024     8:29 AM 6/4/2024     9:07 AM 4/30/2025     7:43 AM   PHQ   PHQ-9 Total Score 12 12 13    Q9: Thoughts of better off dead/self-harm past 2 weeks Not at all Not at all  Several days   F/U: Thoughts of suicide or self-harm   No   F/U: Safety concerns   No       Patient-reported    Proxy-reported           6/7/2023     9:23 AM 6/4/2024     8:29 AM 4/30/2025     7:46 AM   MIMI-7 SCORE   Total Score   14 (moderate anxiety)   Total Score 14 14 14        Patient-reported

## 2025-06-24 DIAGNOSIS — R51.9 NONINTRACTABLE HEADACHE, UNSPECIFIED CHRONICITY PATTERN, UNSPECIFIED HEADACHE TYPE: ICD-10-CM

## 2025-06-24 NOTE — TELEPHONE ENCOUNTER
CONTROLLED SUBSTANCE REFILL REQUEST FOR Butalbital-APAP-Caffeine-codeine   DOSE: 20/325-40 mg  - # 15  SIG: TAKE 1 TABLET BY MOUTH EVERY 4 HOURS AS NEEDED FOR HEADACHES -       LAST REFILL: 6/9/25    LAST APPT RELATED: 4/30/25    NEXT APPT: No current future appointments scheduled at [facility]       CONTROLLED SUBSTANCE AGREEMENT: 4/30/25    URINE DRUG SCREEN: none        FILL HISTORY PER :          REFILL ROUTED TO KN FOR REVIEW

## 2025-06-25 RX ORDER — BUTALBITAL, ACETAMINOPHEN AND CAFFEINE 50; 325; 40 MG/1; MG/1; MG/1
1 TABLET ORAL EVERY 4 HOURS PRN
Qty: 15 TABLET | Refills: 0 | Status: SHIPPED | OUTPATIENT
Start: 2025-06-25

## 2025-06-26 ENCOUNTER — TELEPHONE (OUTPATIENT)
Dept: FAMILY MEDICINE | Facility: CLINIC | Age: 60
End: 2025-06-26

## 2025-06-29 NOTE — Clinical Note
Potential access sites were evaluated for patency using ultrasound.   The right radial artery was selected. Access was obtained under with Sonosite and Fluoroscopic guidance using a micropuncture 21 guage needle with direct visualization of needle entry.      Wt Readings from Last 3 Encounters:   04/16/25 73.5 kg (162 lb)   09/27/24 77.1 kg (170 lb)   09/17/24 77.1 kg (170 lb)   EF 60%, grade 1 diastolic dysfunction  Lasix and aldactone to be resumed tomorrow 6/30  Continue to monitor bp per protocol

## 2025-07-01 DIAGNOSIS — K21.9 GASTROESOPHAGEAL REFLUX DISEASE WITHOUT ESOPHAGITIS: ICD-10-CM

## 2025-07-01 NOTE — TELEPHONE ENCOUNTER
Med: Omeprazole    LOV (related): 3/5/24      Due for F/U around: 10/2025    Next Appt: None scheduled

## 2025-07-02 RX ORDER — OMEPRAZOLE 40 MG/1
40 CAPSULE, DELAYED RELEASE ORAL 2 TIMES DAILY
Qty: 180 CAPSULE | Refills: 0 | Status: SHIPPED | OUTPATIENT
Start: 2025-07-02

## 2025-07-14 DIAGNOSIS — R51.9 NONINTRACTABLE HEADACHE, UNSPECIFIED CHRONICITY PATTERN, UNSPECIFIED HEADACHE TYPE: ICD-10-CM

## 2025-07-14 DIAGNOSIS — G47.01 INSOMNIA DUE TO MEDICAL CONDITION: ICD-10-CM

## 2025-07-14 RX ORDER — ZOLPIDEM TARTRATE 5 MG/1
5 TABLET ORAL
Qty: 15 TABLET | Refills: 3 | Status: SHIPPED | OUTPATIENT
Start: 2025-07-14

## 2025-07-14 RX ORDER — BUTALBITAL, ACETAMINOPHEN AND CAFFEINE 50; 325; 40 MG/1; MG/1; MG/1
1 TABLET ORAL EVERY 4 HOURS PRN
Qty: 15 TABLET | Refills: 0 | Status: SHIPPED | OUTPATIENT
Start: 2025-07-14

## 2025-07-21 DIAGNOSIS — I25.10 CORONARY ARTERY DISEASE INVOLVING NATIVE CORONARY ARTERY OF NATIVE HEART WITHOUT ANGINA PECTORIS: ICD-10-CM

## 2025-07-21 RX ORDER — ATORVASTATIN CALCIUM 40 MG/1
40 TABLET, FILM COATED ORAL DAILY
Qty: 90 TABLET | Refills: 1 | Status: SHIPPED | OUTPATIENT
Start: 2025-07-21

## 2025-07-28 DIAGNOSIS — R51.9 NONINTRACTABLE HEADACHE, UNSPECIFIED CHRONICITY PATTERN, UNSPECIFIED HEADACHE TYPE: ICD-10-CM

## 2025-07-28 RX ORDER — BUTALBITAL, ACETAMINOPHEN AND CAFFEINE 50; 325; 40 MG/1; MG/1; MG/1
1 TABLET ORAL EVERY 4 HOURS PRN
Qty: 15 TABLET | Refills: 0 | Status: SHIPPED | OUTPATIENT
Start: 2025-07-28

## 2025-08-13 ENCOUNTER — MYC REFILL (OUTPATIENT)
Dept: FAMILY MEDICINE | Facility: CLINIC | Age: 60
End: 2025-08-13

## 2025-08-13 DIAGNOSIS — R51.9 NONINTRACTABLE HEADACHE, UNSPECIFIED CHRONICITY PATTERN, UNSPECIFIED HEADACHE TYPE: ICD-10-CM

## 2025-08-13 RX ORDER — BUTALBITAL, ACETAMINOPHEN AND CAFFEINE 50; 325; 40 MG/1; MG/1; MG/1
1 TABLET ORAL EVERY 4 HOURS PRN
Qty: 15 TABLET | Refills: 0 | Status: SHIPPED | OUTPATIENT
Start: 2025-08-13

## 2025-08-14 RX ORDER — BUTALBITAL, ACETAMINOPHEN AND CAFFEINE 50; 325; 40 MG/1; MG/1; MG/1
1 TABLET ORAL EVERY 4 HOURS PRN
Qty: 15 TABLET | Refills: 0 | OUTPATIENT
Start: 2025-08-14

## (undated) DEVICE — VALVE HEMOSTASIS .096" COPILOT MECH 1003331

## (undated) DEVICE — DEFIB PRO-PADZ LVP LQD GEL ADULT 8900-2105-01

## (undated) DEVICE — SLEEVE TR BAND RADIAL COMPRESSION DEVICE 24CM TRB24-REG

## (undated) DEVICE — CATH BALLOON EMERGE 2.0X12MM H7493918912200

## (undated) DEVICE — CATH BALLOON NC EMERGE 2.25X12MM H7493926712220

## (undated) DEVICE — TOTE ANGIO CORP PC15AT SAN32CC83O

## (undated) DEVICE — Device

## (undated) DEVICE — KIT HAND CONTROL ANGIOTOUCH ACIST 65CM AT-P65

## (undated) DEVICE — CATH LAUNCHER 6FR EBU 3.5 LA6EBU35

## (undated) DEVICE — CATH JACKY 5FR 3.5 CURVE 40-5023

## (undated) DEVICE — INFL DVC KIT W/10CC NITRO IN4530

## (undated) DEVICE — MANIFOLD KIT ANGIO AUTOMATED 014613

## (undated) DEVICE — WIRE GUIDE 0.035"X260CM SAFE-T-J EXCHANGE G00517

## (undated) DEVICE — CATH ANGIO INFINITI PIGTAIL 145 6 SH 6FRX110CM  534-652S

## (undated) DEVICE — GUIDEWIRE VASC 0.014INX180CM RUNTHROUGH 25-1011

## (undated) RX ORDER — NITROGLYCERIN 5 MG/ML
VIAL (ML) INTRAVENOUS
Status: DISPENSED
Start: 2019-10-10

## (undated) RX ORDER — HEPARIN SODIUM 1000 [USP'U]/ML
INJECTION, SOLUTION INTRAVENOUS; SUBCUTANEOUS
Status: DISPENSED
Start: 2019-10-10

## (undated) RX ORDER — LIDOCAINE HYDROCHLORIDE 10 MG/ML
INJECTION, SOLUTION EPIDURAL; INFILTRATION; INTRACAUDAL; PERINEURAL
Status: DISPENSED
Start: 2019-10-10

## (undated) RX ORDER — FENTANYL CITRATE 50 UG/ML
INJECTION, SOLUTION INTRAMUSCULAR; INTRAVENOUS
Status: DISPENSED
Start: 2019-10-10

## (undated) RX ORDER — METOPROLOL TARTRATE 1 MG/ML
INJECTION, SOLUTION INTRAVENOUS
Status: DISPENSED
Start: 2019-10-10

## (undated) RX ORDER — VERAPAMIL HYDROCHLORIDE 2.5 MG/ML
INJECTION, SOLUTION INTRAVENOUS
Status: DISPENSED
Start: 2019-10-10